# Patient Record
Sex: MALE | Race: WHITE | Employment: OTHER | URBAN - METROPOLITAN AREA
[De-identification: names, ages, dates, MRNs, and addresses within clinical notes are randomized per-mention and may not be internally consistent; named-entity substitution may affect disease eponyms.]

---

## 2017-06-11 ENCOUNTER — APPOINTMENT (OUTPATIENT)
Dept: GENERAL RADIOLOGY | Age: 62
End: 2017-06-11
Attending: EMERGENCY MEDICINE
Payer: SELF-PAY

## 2017-06-11 ENCOUNTER — HOSPITAL ENCOUNTER (EMERGENCY)
Age: 62
Discharge: HOME | End: 2017-06-11
Attending: EMERGENCY MEDICINE
Payer: SELF-PAY

## 2017-06-11 VITALS
HEIGHT: 71 IN | TEMPERATURE: 97.2 F | HEART RATE: 71 BPM | WEIGHT: 200 LBS | SYSTOLIC BLOOD PRESSURE: 157 MMHG | RESPIRATION RATE: 18 BRPM | DIASTOLIC BLOOD PRESSURE: 97 MMHG | OXYGEN SATURATION: 95 % | BODY MASS INDEX: 28 KG/M2

## 2017-06-11 DIAGNOSIS — R07.89 CHEST WALL PAIN: Primary | ICD-10-CM

## 2017-06-11 PROCEDURE — 99282 EMERGENCY DEPT VISIT SF MDM: CPT | Performed by: EMERGENCY MEDICINE

## 2017-06-11 PROCEDURE — 71120 X-RAY EXAM BREASTBONE 2/>VWS: CPT

## 2017-06-11 PROCEDURE — 71020 XR CHEST PA LAT: CPT

## 2017-06-11 RX ORDER — HYDROCODONE BITARTRATE AND ACETAMINOPHEN 5; 325 MG/1; MG/1
1 TABLET ORAL
Qty: 12 TAB | Refills: 0 | Status: SHIPPED | OUTPATIENT
Start: 2017-06-11 | End: 2021-05-25

## 2017-06-11 RX ORDER — NAPROXEN 375 MG/1
375 TABLET ORAL 2 TIMES DAILY WITH MEALS
Qty: 14 TAB | Refills: 0 | Status: SHIPPED | OUTPATIENT
Start: 2017-06-11 | End: 2021-05-25

## 2017-06-11 RX ORDER — LORAZEPAM 1 MG/1
1 TABLET ORAL
COMMUNITY

## 2017-06-11 NOTE — ED TRIAGE NOTES
Patient states 2 weeks ago he fell onto his chest in a parking lot. States that he tripped over a concrete parking block. Patient also states he hit his face. Patient also reports dyspnea.

## 2017-06-11 NOTE — DISCHARGE INSTRUCTIONS
Musculoskeletal Chest Pain: Care Instructions  Your Care Instructions  Chest pain is not always a sign that something is wrong with your heart or that you have another serious problem. The doctor thinks your chest pain is caused by strained muscles or ligaments, inflamed chest cartilage, or another problem in your chest, rather than by your heart. You may need more tests to find the cause of your chest pain. Follow-up care is a key part of your treatment and safety. Be sure to make and go to all appointments, and call your doctor if you are having problems. Its also a good idea to know your test results and keep a list of the medicines you take. How can you care for yourself at home? · Take pain medicines exactly as directed. ¨ If the doctor gave you a prescription medicine for pain, take it as prescribed. ¨ If you are not taking a prescription pain medicine, ask your doctor if you can take an over-the-counter medicine. · Rest and protect the sore area. · Stop, change, or take a break from any activity that may be causing your pain or soreness. · Put ice or a cold pack on the sore area for 10 to 20 minutes at a time. Try to do this every 1 to 2 hours for the next 3 days (when you are awake) or until the swelling goes down. Put a thin cloth between the ice and your skin. · After 2 or 3 days, apply a heating pad set on low or a warm cloth to the area that hurts. Some doctors suggest that you go back and forth between hot and cold. · Do not wrap or tape your ribs for support. This may cause you to take smaller breaths, which could increase your risk of lung problems. · Mentholated creams such as Bengay or Icy Hot may soothe sore muscles. Follow the instructions on the package. · Follow your doctor's instructions for exercising. · Gentle stretching and massage may help you get better faster. Stretch slowly to the point just before pain begins, and hold the stretch for at least 15 to 30 seconds.  Do this 3 or 4 times a day. Stretch just after you have applied heat. · As your pain gets better, slowly return to your normal activities. Any increased pain may be a sign that you need to rest a while longer. When should you call for help? Call 911 anytime you think you may need emergency care. For example, call if:  · You have chest pain or pressure. This may occur with:  ¨ Sweating. ¨ Shortness of breath. ¨ Nausea or vomiting. ¨ Pain that spreads from the chest to the neck, jaw, or one or both shoulders or arms. ¨ Dizziness or lightheadedness. ¨ A fast or uneven pulse. After calling 911, chew 1 adult-strength aspirin. Wait for an ambulance. Do not try to drive yourself. · You have sudden chest pain and shortness of breath, or you cough up blood. Call your doctor now or seek immediate medical care if:  · You have any trouble breathing. · Your chest pain gets worse. · Your chest pain occurs consistently with exercise and is relieved by rest.  Watch closely for changes in your health, and be sure to contact your doctor if:  · Your chest pain does not get better after 1 week. Where can you learn more? Go to http://vianca-ashlyn.info/. Enter V293 in the search box to learn more about \"Musculoskeletal Chest Pain: Care Instructions. \"  Current as of: May 27, 2016  Content Version: 11.2  © 1254-2333 [employer]. Care instructions adapted under license by [employer] (which disclaims liability or warranty for this information). If you have questions about a medical condition or this instruction, always ask your healthcare professional. [place] [address] any warranty or liability for your use of this information.

## 2017-06-11 NOTE — ED PROVIDER NOTES
HPI Comments: Patient is a 66 yo male who is coming in after having a fall about 2 weeks ago he states he tripped in a parking lot and fell onto the ground and hit the center of his chest on the concrete parking curb. He states that the pain has been constant and he states he does not feel sob now but sometimes the pain has made him feel this way. Patient is a 65 y.o. male presenting with fall. The history is provided by the patient. Fall   Pertinent negatives include no fever and no numbness. History reviewed. No pertinent past medical history. History reviewed. No pertinent surgical history. History reviewed. No pertinent family history. Social History     Social History    Marital status: SINGLE     Spouse name: N/A    Number of children: N/A    Years of education: N/A     Occupational History    Not on file. Social History Main Topics    Smoking status: Former Smoker    Smokeless tobacco: Not on file    Alcohol use Yes    Drug use: No    Sexual activity: Not on file     Other Topics Concern    Not on file     Social History Narrative         ALLERGIES: Other medication and Venom-yellow jacket    Review of Systems   Constitutional: Negative for chills and fever. Cardiovascular: Positive for chest pain. Musculoskeletal: Negative for back pain and neck pain. Skin: Negative for color change, pallor and wound. Neurological: Negative for weakness and numbness. All other systems reviewed and are negative. Vitals:    06/11/17 0630   BP: (!) 147/99   Pulse: 82   Resp: 20   Temp: 97.4 °F (36.3 °C)   SpO2: 97%   Weight: 90.7 kg (200 lb)   Height: 5' 11\" (1.803 m)            Physical Exam   Constitutional: He is oriented to person, place, and time. He appears well-developed and well-nourished. No distress. HENT:   Head: Normocephalic and atraumatic. Eyes: Conjunctivae are normal. No scleral icterus. Neck: Normal range of motion. Neck supple.    Cardiovascular: Normal rate, regular rhythm and normal heart sounds. Pulmonary/Chest: Effort normal and breath sounds normal. No stridor. No respiratory distress. He has no wheezes. He has no rales. He exhibits tenderness (patient is very tender in the anterior mid sternum with palpation). Abdominal: Soft. He exhibits no distension. There is no tenderness. There is no rebound and no guarding. Musculoskeletal: Normal range of motion. Neurological: He is alert and oriented to person, place, and time. No cranial nerve deficit. No focal weakness   Skin: Skin is warm and dry. No rash noted. He is not diaphoretic. No erythema. Psychiatric: He has a normal mood and affect. His behavior is normal.   Nursing note and vitals reviewed. MDM  Number of Diagnoses or Management Options  Chest wall pain:   Diagnosis management comments: Xray are negative normal vitals patient is in no distress I am treating symptomatically. Yanely Garcia MD; 6/11/2017 @7:40 AM Voice dictation software was used during the making of this note. This software is not perfect and grammatical and other typographical errors may be present. This note has not been proofread for errors.  ===================================================================        Amount and/or Complexity of Data Reviewed  Tests in the radiology section of CPT®: ordered and reviewed (Xr Chest Pa Lat    Result Date: 6/11/2017  Two view chest:  06/11/2017 History: Fall, chest pain. Comparison: None Findings: The heart and mediastinal silhouette are normal in size and configuration. The lungs and pleural spaces are clear. The pulmonary vascularity is within normal limits. The visualized osseous structures are unremarkable. Impression: Unremarkable two-view chest series. Xr Sternum    Result Date: 6/11/2017  Sternal radiographs HISTORY: Fall, pain 3 views of the sternum were obtained. No prior studies are available for comparison.  FINDINGS: There is no evidence of an acute sternal fracture. There is no bony destruction. The lungs appear grossly clear. IMPRESSION: No evidence of acute sternal fracture.    )      ED Course       Procedures

## 2017-10-24 PROBLEM — K43.9 VENTRAL HERNIA WITHOUT OBSTRUCTION OR GANGRENE: Status: ACTIVE | Noted: 2017-10-24

## 2018-04-13 ENCOUNTER — RX ONLY (OUTPATIENT)
Age: 63
Setting detail: RX ONLY
End: 2018-04-13

## 2018-04-13 PROBLEM — N40.1 BENIGN PROSTATIC HYPERPLASIA WITH LOWER URINARY TRACT SYMPTOMS: Status: ACTIVE | Noted: 2018-04-13

## 2019-04-25 PROBLEM — F51.01 PRIMARY INSOMNIA: Status: ACTIVE | Noted: 2019-04-25

## 2019-10-16 PROBLEM — H34.8122 CENTRAL RETINAL VEIN OCCLUSION, LEFT EYE, STABLE: Status: ACTIVE | Noted: 2019-10-16

## 2019-10-16 PROBLEM — I10 ESSENTIAL (PRIMARY) HYPERTENSION: Status: ACTIVE | Noted: 2019-10-16

## 2020-02-24 ENCOUNTER — RX ONLY (OUTPATIENT)
Age: 65
Setting detail: RX ONLY
End: 2020-02-24

## 2020-05-07 ENCOUNTER — RX ONLY (OUTPATIENT)
Age: 65
Setting detail: RX ONLY
End: 2020-05-07

## 2020-06-09 ENCOUNTER — RX ONLY (OUTPATIENT)
Age: 65
Setting detail: RX ONLY
End: 2020-06-09

## 2020-07-08 ENCOUNTER — RX ONLY (OUTPATIENT)
Age: 65
Setting detail: RX ONLY
End: 2020-07-08

## 2020-07-30 PROBLEM — M17.11 UNILATERAL PRIMARY OSTEOARTHRITIS, RIGHT KNEE: Status: ACTIVE | Noted: 2020-07-30

## 2020-11-24 ENCOUNTER — RX ONLY (OUTPATIENT)
Age: 65
Setting detail: RX ONLY
End: 2020-11-24

## 2020-12-15 ENCOUNTER — RX ONLY (OUTPATIENT)
Age: 65
Setting detail: RX ONLY
End: 2020-12-15

## 2021-05-03 ENCOUNTER — RX ONLY (OUTPATIENT)
Age: 66
Setting detail: RX ONLY
End: 2021-05-03

## 2021-05-03 PROBLEM — N41.0 ACUTE PROSTATITIS: Status: ACTIVE | Noted: 2021-05-03

## 2021-05-03 PROBLEM — R97.20 ELEVATED PROSTATE SPECIFIC ANTIGEN [PSA]: Status: ACTIVE | Noted: 2021-05-03

## 2021-05-05 ENCOUNTER — RX ONLY (OUTPATIENT)
Age: 66
Setting detail: RX ONLY
End: 2021-05-05

## 2021-05-12 ENCOUNTER — APPOINTMENT (RX ONLY)
Dept: URBAN - METROPOLITAN AREA CLINIC 23 | Facility: CLINIC | Age: 66
Setting detail: DERMATOLOGY
End: 2021-05-12

## 2021-05-12 DIAGNOSIS — L57.8 OTHER SKIN CHANGES DUE TO CHRONIC EXPOSURE TO NONIONIZING RADIATION: ICD-10-CM

## 2021-05-12 DIAGNOSIS — D485 NEOPLASM OF UNCERTAIN BEHAVIOR OF SKIN: ICD-10-CM

## 2021-05-12 PROBLEM — D48.5 NEOPLASM OF UNCERTAIN BEHAVIOR OF SKIN: Status: ACTIVE | Noted: 2021-05-12

## 2021-05-12 PROCEDURE — ? BIOPSY BY SHAVE METHOD

## 2021-05-12 PROCEDURE — 99202 OFFICE O/P NEW SF 15 MIN: CPT | Mod: 25

## 2021-05-12 PROCEDURE — ? COUNSELING

## 2021-05-12 PROCEDURE — 11102 TANGNTL BX SKIN SINGLE LES: CPT

## 2021-05-12 ASSESSMENT — LOCATION SIMPLE DESCRIPTION DERM
LOCATION SIMPLE: POSTERIOR NECK
LOCATION SIMPLE: CHEST
LOCATION SIMPLE: ABDOMEN
LOCATION SIMPLE: RIGHT FOREARM
LOCATION SIMPLE: LEFT FOREARM

## 2021-05-12 ASSESSMENT — LOCATION DETAILED DESCRIPTION DERM
LOCATION DETAILED: RIGHT RIB CAGE
LOCATION DETAILED: LEFT PROXIMAL DORSAL FOREARM
LOCATION DETAILED: LEFT MEDIAL SUPERIOR CHEST
LOCATION DETAILED: RIGHT PROXIMAL DORSAL FOREARM
LOCATION DETAILED: RIGHT MEDIAL TRAPEZIAL NECK

## 2021-05-12 ASSESSMENT — LOCATION ZONE DERM
LOCATION ZONE: TRUNK
LOCATION ZONE: ARM
LOCATION ZONE: NECK

## 2021-05-12 NOTE — PROCEDURE: BIOPSY BY SHAVE METHOD
Depth Of Biopsy: dermis
Biopsy Type: H and E
Additional Anesthesia Volume In Cc (Will Not Render If 0): 0
Hide Biopsy Depth?: No
Type Of Destruction Used: Curettage
Wound Care: Vaseline
Accession #: [id number]
Hemostasis: Aluminum Chloride
Information: Selecting Yes will display possible errors in your note based on the variables you have selected. This validation is only offered as a suggestion for you. PLEASE NOTE THAT THE VALIDATION TEXT WILL BE REMOVED WHEN YOU FINALIZE YOUR NOTE. IF YOU WANT TO FAX A PRELIMINARY NOTE YOU WILL NEED TO TOGGLE THIS TO 'NO' IF YOU DO NOT WANT IT IN YOUR FAXED NOTE.
Billing Type: Third-Party Bill
Cryotherapy Text: The wound bed was treated with cryotherapy after the biopsy was performed.
Detail Level: Detailed
Anesthesia Type: 1% lidocaine with epinephrine
Anesthesia Volume In Cc: 0.3
Silver Nitrate Text: The wound bed was treated with silver nitrate after the biopsy was performed.
Notification Instructions: Patient will be notified of biopsy results. However, patient instructed to call the office if not contacted within 2 weeks.
Electrodesiccation And Curettage Text: The wound bed was treated with electrodesiccation and curettage after the biopsy was performed.
Post-care instructions were reviewed in detail and written instructions are provided. Patient is to keep the biopsy site dry overnight, and then apply bacitracin twice daily until healed. Patient may apply hydrogen peroxide soaks to remove any crusting.
Consent: Written consent was obtained and risks were reviewed including but not limited to scarring, infection, bleeding, scabbing, incomplete removal, and allergy to anesthesia.
Electrodesiccation Text: The wound bed was treated with electrodesiccation after the biopsy was performed.
Biopsy Method: Dermablade
Was A Bandage Applied: Yes
Dressing: bandage

## 2021-06-10 ENCOUNTER — APPOINTMENT (RX ONLY)
Dept: URBAN - METROPOLITAN AREA CLINIC 23 | Facility: CLINIC | Age: 66
Setting detail: DERMATOLOGY
End: 2021-06-10

## 2021-06-10 PROBLEM — C44.529 SQUAMOUS CELL CARCINOMA OF SKIN OF OTHER PART OF TRUNK: Status: ACTIVE | Noted: 2021-06-10

## 2021-06-10 PROCEDURE — 12032 INTMD RPR S/A/T/EXT 2.6-7.5: CPT

## 2021-06-10 PROCEDURE — 11602 EXC TR-EXT MAL+MARG 1.1-2 CM: CPT

## 2021-06-10 PROCEDURE — ? EXCISION

## 2021-06-12 ENCOUNTER — HOSPITAL ENCOUNTER (OUTPATIENT)
Dept: MRI IMAGING | Age: 66
Discharge: HOME | End: 2021-06-12
Attending: UROLOGY
Payer: MEDICARE

## 2021-06-12 DIAGNOSIS — R97.20 ELEVATED PSA: ICD-10-CM

## 2021-06-12 PROCEDURE — 74011000258 HC RX REV CODE- 258: Performed by: UROLOGY

## 2021-06-12 PROCEDURE — A9576 INJ PROHANCE MULTIPACK: HCPCS | Performed by: UROLOGY

## 2021-06-12 PROCEDURE — 74011636320 HC RX REV CODE- 636/320: Performed by: UROLOGY

## 2021-06-12 PROCEDURE — 72197 MRI PELVIS W/O & W/DYE: CPT

## 2021-06-12 RX ORDER — SODIUM CHLORIDE 0.9 % (FLUSH) 0.9 %
10 SYRINGE (ML) INJECTION
Status: COMPLETED | OUTPATIENT
Start: 2021-06-12 | End: 2021-06-12

## 2021-06-12 RX ADMIN — SODIUM CHLORIDE 100 ML: 900 INJECTION, SOLUTION INTRAVENOUS at 08:25

## 2021-06-12 RX ADMIN — GADOTERIDOL 19 ML: 279.3 INJECTION, SOLUTION INTRAVENOUS at 08:24

## 2021-06-12 RX ADMIN — Medication 10 ML: at 08:25

## 2021-06-15 NOTE — PROGRESS NOTES
Pls call pt and read him the impression of his prostate MRI. That is an area I will recommend we do a biopsy of-- but pls tell him I will discuss this with him in the office and show him the MRI images. Move up his appt if he wants to.   Thx, will

## 2021-06-24 ENCOUNTER — APPOINTMENT (RX ONLY)
Dept: URBAN - METROPOLITAN AREA CLINIC 23 | Facility: CLINIC | Age: 66
Setting detail: DERMATOLOGY
End: 2021-06-24

## 2021-06-24 DIAGNOSIS — Z48.01 ENCOUNTER FOR CHANGE OR REMOVAL OF SURGICAL WOUND DRESSING: ICD-10-CM

## 2021-06-24 PROCEDURE — ? SUTURE REMOVAL (GLOBAL PERIOD)

## 2021-06-24 ASSESSMENT — LOCATION SIMPLE DESCRIPTION DERM: LOCATION SIMPLE: ABDOMEN

## 2021-06-24 ASSESSMENT — LOCATION DETAILED DESCRIPTION DERM: LOCATION DETAILED: RIGHT RIB CAGE

## 2021-06-24 ASSESSMENT — LOCATION ZONE DERM: LOCATION ZONE: TRUNK

## 2021-06-24 NOTE — PROCEDURE: SUTURE REMOVAL (GLOBAL PERIOD)
Add 39854 Cpt? (Important Note: In 2017 The Use Of 12421 Is Being Tracked By Cms To Determine Future Global Period Reimbursement For Global Periods): no
Detail Level: Simple

## 2021-07-09 ENCOUNTER — HOSPITAL ENCOUNTER (EMERGENCY)
Age: 66
Discharge: HOME | End: 2021-07-09
Payer: MEDICARE

## 2021-07-09 VITALS
SYSTOLIC BLOOD PRESSURE: 146 MMHG | HEART RATE: 62 BPM | RESPIRATION RATE: 18 BRPM | DIASTOLIC BLOOD PRESSURE: 96 MMHG | WEIGHT: 200 LBS | OXYGEN SATURATION: 97 % | TEMPERATURE: 98.1 F | BODY MASS INDEX: 27.89 KG/M2

## 2021-07-09 DIAGNOSIS — R33.9 URINARY RETENTION: Primary | ICD-10-CM

## 2021-07-09 LAB
APPEARANCE UR: CLEAR
BILIRUB UR QL: NEGATIVE
COLOR UR: YELLOW
GLUCOSE UR STRIP.AUTO-MCNC: NEGATIVE MG/DL
HGB UR QL STRIP: NEGATIVE
KETONES UR QL STRIP.AUTO: NEGATIVE MG/DL
LEUKOCYTE ESTERASE UR QL STRIP.AUTO: NEGATIVE
NITRITE UR QL STRIP.AUTO: NEGATIVE
PH UR STRIP: 5.5 [PH] (ref 5–9)
PROT UR STRIP-MCNC: NEGATIVE MG/DL
SP GR UR REFRACTOMETRY: 1.03 (ref 1–1.02)
UROBILINOGEN UR QL STRIP.AUTO: 1 EU/DL (ref 0.2–1)

## 2021-07-09 PROCEDURE — 51702 INSERT TEMP BLADDER CATH: CPT

## 2021-07-09 PROCEDURE — 99283 EMERGENCY DEPT VISIT LOW MDM: CPT

## 2021-07-09 PROCEDURE — 81003 URINALYSIS AUTO W/O SCOPE: CPT

## 2021-07-09 PROCEDURE — 87086 URINE CULTURE/COLONY COUNT: CPT

## 2021-07-09 NOTE — ED TRIAGE NOTES
Pt states he has not been able to void today, pt states he requested not to have a michael when he was at his urologist, was instructed to take an extra flomax but it is not helping.  States the urologist checked his urine for infection and there was none

## 2021-07-09 NOTE — ED PROVIDER NOTES
28-year-old male complaining of urinary retention. Patient states that he has had problems with this in the past was seen at the urologist office today due to difficulty urinating he did not allow them to put a catheter in today he is being worked up and has a scheduled MRI of the prostate on July 22. Bladder Infection   This is a recurrent problem. The current episode started more than 2 days ago. The problem has not changed since onset. The quality of the pain is described as aching. The pain is at a severity of 8/10. The pain is moderate. There has been no fever. He is not sexually active. Associated symptoms include hesitancy. Pertinent negatives include no chills, no sweats, no vomiting and no discharge. He has tried nothing for the symptoms. No past medical history on file. No past surgical history on file. No family history on file. Social History     Socioeconomic History    Marital status: SINGLE     Spouse name: Not on file    Number of children: Not on file    Years of education: Not on file    Highest education level: Not on file   Occupational History    Not on file   Tobacco Use    Smoking status: Former Smoker    Smokeless tobacco: Never Used   Substance and Sexual Activity    Alcohol use: Yes    Drug use: No    Sexual activity: Not on file   Other Topics Concern    Not on file   Social History Narrative    Not on file     Social Determinants of Health     Financial Resource Strain:     Difficulty of Paying Living Expenses:    Food Insecurity:     Worried About Running Out of Food in the Last Year:     [address] in the Last Year:    Transportation Needs:     Lack of Transportation (Medical):      Lack of Transportation (Non-Medical):    Physical Activity:     Days of Exercise per Week:     Minutes of Exercise per Session:    Stress:     Feeling of Stress :    Social Connections:     Frequency of Communication with Friends and Family:     Frequency of Social Gatherings with Friends and Family:     Attends Taoism Services:     Active Member of Clubs or Organizations:     Attends Club or Organization Meetings:     Marital Status:    Intimate Partner Violence:     Fear of Current or Ex-Partner:     Emotionally Abused:     Physically Abused:     Sexually Abused: ALLERGIES: Monosodium glutamate, Other medication, and Venom-yellow jacket    Review of Systems   Constitutional: Negative. Negative for activity change and chills. HENT: Negative. Eyes: Negative. Respiratory: Negative. Cardiovascular: Negative. Gastrointestinal: Negative. Negative for vomiting. Genitourinary: Positive for hesitancy. Musculoskeletal: Negative. Skin: Negative. Neurological: Negative. Psychiatric/Behavioral: Negative. All other systems reviewed and are negative. Vitals:    07/09/21 0029   BP: (!) 144/92   Pulse: 75   Resp: 18   Temp: 98.1 °F (36.7 °C)   SpO2: 98%   Weight: 90.7 kg (200 lb)            Physical Exam  Vitals and nursing note reviewed. Constitutional:       General: He is not in acute distress. Appearance: He is well-developed. HENT:      Head: Normocephalic and atraumatic. Right Ear: External ear normal.      Left Ear: External ear normal.      Nose: Nose normal.   Eyes:      General: No scleral icterus. Right eye: No discharge. Left eye: No discharge. Conjunctiva/sclera: Conjunctivae normal.      Pupils: Pupils are equal, round, and reactive to light. Cardiovascular:      Rate and Rhythm: Regular rhythm. Pulmonary:      Effort: Pulmonary effort is normal. No respiratory distress. Breath sounds: Normal breath sounds. No stridor. No wheezing or rales. Abdominal:      General: Bowel sounds are normal. There is no distension. Palpations: Abdomen is soft. Tenderness: There is no abdominal tenderness. Musculoskeletal:         General: Normal range of motion.       Cervical back: Normal range of motion. Skin:     General: Skin is warm and dry. Findings: No rash. Neurological:      Mental Status: He is alert and oriented to person, place, and time. Motor: No abnormal muscle tone. Coordination: Coordination normal.   Psychiatric:         Behavior: Behavior normal.          MDM  Number of Diagnoses or Management Options  Diagnosis management comments: Differential diagnosis includes but is not limited to: Bladder outlet obstruction, kidney stones, UTI, bladder spasm, enlarged prostate,    Hauser Catheter was placed without difficulty clear jerrod urine was returned there was no signs of infection on micro. Patient requesting Hauser be taken out he be discharged and will see his urologist this morning.        Amount and/or Complexity of Data Reviewed  Clinical lab tests: ordered and reviewed  Tests in the medicine section of CPT®: ordered and reviewed  Decide to obtain previous medical records or to obtain history from someone other than the patient: yes  Review and summarize past medical records: yes  Independent visualization of images, tracings, or specimens: yes    Risk of Complications, Morbidity, and/or Mortality  Presenting problems: high  Diagnostic procedures: high  Management options: high    Patient Progress  Patient progress: stable         Procedures

## 2021-07-09 NOTE — ED NOTES
I have reviewed discharge instructions with the patient. The patient verbalized understanding. Patient left ED via Discharge Method: ambulatory to Home     Opportunity for questions and clarification provided. Patient given 0 scripts. To continue your aftercare when you leave the hospital, you may receive an automated call from our care team to check in on how you are doing.  This is a free service and part of our promise to provide the best care and service to meet your aftercare needs. \" If you have questions, or wish to unsubscribe from this service please call [phone].  Thank you for Choosing our [address] [place] [address] Emergency Department.

## 2021-07-11 LAB
BACTERIA SPEC CULT: NORMAL
SERVICE CMNT-IMP: NORMAL

## 2021-07-22 ENCOUNTER — ANESTHESIA (OUTPATIENT)
Dept: SURGERY | Age: 66
End: 2021-07-22
Payer: MEDICARE

## 2021-07-22 ENCOUNTER — ANESTHESIA EVENT (OUTPATIENT)
Dept: SURGERY | Age: 66
End: 2021-07-22
Payer: MEDICARE

## 2021-07-22 ENCOUNTER — HOSPITAL ENCOUNTER (OUTPATIENT)
Age: 66
Setting detail: OUTPATIENT SURGERY
Discharge: HOME | End: 2021-07-22
Attending: UROLOGY | Admitting: UROLOGY
Payer: MEDICARE

## 2021-07-22 VITALS
HEIGHT: 71 IN | DIASTOLIC BLOOD PRESSURE: 87 MMHG | BODY MASS INDEX: 28.7 KG/M2 | HEART RATE: 61 BPM | SYSTOLIC BLOOD PRESSURE: 144 MMHG | OXYGEN SATURATION: 99 % | TEMPERATURE: 97.6 F | RESPIRATION RATE: 12 BRPM | WEIGHT: 205 LBS

## 2021-07-22 DIAGNOSIS — R97.20 ELEVATED PSA: ICD-10-CM

## 2021-07-22 LAB
APPEARANCE UR: CLEAR
BACTERIA URNS QL MICRO: 0 /HPF
BILIRUB UR QL: NEGATIVE
COLOR UR: YELLOW
GLUCOSE UR STRIP.AUTO-MCNC: NEGATIVE MG/DL
HGB UR QL STRIP: NEGATIVE
KETONES UR QL STRIP.AUTO: NEGATIVE MG/DL
LEUKOCYTE ESTERASE UR QL STRIP.AUTO: NEGATIVE
NITRITE UR QL STRIP.AUTO: NEGATIVE
PH UR STRIP: 5.5 [PH] (ref 5–9)
PROT UR STRIP-MCNC: NEGATIVE MG/DL
SP GR UR REFRACTOMETRY: 1.02 (ref 1–1.02)
UROBILINOGEN UR QL STRIP.AUTO: 0.2 EU/DL (ref 0.2–1)

## 2021-07-22 PROCEDURE — 74011000250 HC RX REV CODE- 250: Performed by: STUDENT IN AN ORGANIZED HEALTH CARE EDUCATION/TRAINING PROGRAM

## 2021-07-22 PROCEDURE — 76210000020 HC REC RM PH II FIRST 0.5 HR: Performed by: UROLOGY

## 2021-07-22 PROCEDURE — 76210000006 HC OR PH I REC 0.5 TO 1 HR: Performed by: UROLOGY

## 2021-07-22 PROCEDURE — 2709999900 HC NON-CHARGEABLE SUPPLY: Performed by: UROLOGY

## 2021-07-22 PROCEDURE — 74011000258 HC RX REV CODE- 258: Performed by: UROLOGY

## 2021-07-22 PROCEDURE — 88305 TISSUE EXAM BY PATHOLOGIST: CPT

## 2021-07-22 PROCEDURE — 76060000032 HC ANESTHESIA 0.5 TO 1 HR: Performed by: UROLOGY

## 2021-07-22 PROCEDURE — 55700 PR BIOPSY OF PROSTATE,NEEDLE/PUNCH: CPT | Performed by: UROLOGY

## 2021-07-22 PROCEDURE — 74011250636 HC RX REV CODE- 250/636: Performed by: STUDENT IN AN ORGANIZED HEALTH CARE EDUCATION/TRAINING PROGRAM

## 2021-07-22 PROCEDURE — 74011250636 HC RX REV CODE- 250/636: Performed by: ANESTHESIOLOGY

## 2021-07-22 PROCEDURE — 76942 ECHO GUIDE FOR BIOPSY: CPT | Performed by: UROLOGY

## 2021-07-22 PROCEDURE — 74011250637 HC RX REV CODE- 250/637: Performed by: ANESTHESIOLOGY

## 2021-07-22 PROCEDURE — 81003 URINALYSIS AUTO W/O SCOPE: CPT

## 2021-07-22 PROCEDURE — 77030003481 HC NDL BIOP GUN BARD -B: Performed by: UROLOGY

## 2021-07-22 PROCEDURE — 74011250636 HC RX REV CODE- 250/636: Performed by: UROLOGY

## 2021-07-22 PROCEDURE — 76010000154 HC OR TIME FIRST 0.5 HR: Performed by: UROLOGY

## 2021-07-22 RX ORDER — LIDOCAINE HYDROCHLORIDE 10 MG/ML
0.3 INJECTION INFILTRATION; PERINEURAL ONCE
Status: DISCONTINUED | OUTPATIENT
Start: 2021-07-22 | End: 2021-07-22 | Stop reason: HOSPADM

## 2021-07-22 RX ORDER — LIDOCAINE HYDROCHLORIDE 20 MG/ML
INJECTION, SOLUTION EPIDURAL; INFILTRATION; INTRACAUDAL; PERINEURAL AS NEEDED
Status: DISCONTINUED | OUTPATIENT
Start: 2021-07-22 | End: 2021-07-22 | Stop reason: HOSPADM

## 2021-07-22 RX ORDER — MIDAZOLAM HYDROCHLORIDE 1 MG/ML
2 INJECTION, SOLUTION INTRAMUSCULAR; INTRAVENOUS
Status: DISCONTINUED | OUTPATIENT
Start: 2021-07-22 | End: 2021-07-22 | Stop reason: HOSPADM

## 2021-07-22 RX ORDER — SODIUM CHLORIDE, SODIUM LACTATE, POTASSIUM CHLORIDE, CALCIUM CHLORIDE 600; 310; 30; 20 MG/100ML; MG/100ML; MG/100ML; MG/100ML
100 INJECTION, SOLUTION INTRAVENOUS CONTINUOUS
Status: DISCONTINUED | OUTPATIENT
Start: 2021-07-22 | End: 2021-07-22 | Stop reason: HOSPADM

## 2021-07-22 RX ORDER — SODIUM CHLORIDE 0.9 % (FLUSH) 0.9 %
5-40 SYRINGE (ML) INJECTION AS NEEDED
Status: DISCONTINUED | OUTPATIENT
Start: 2021-07-22 | End: 2021-07-22 | Stop reason: HOSPADM

## 2021-07-22 RX ORDER — OXYCODONE HYDROCHLORIDE 5 MG/1
10 TABLET ORAL
Status: COMPLETED | OUTPATIENT
Start: 2021-07-22 | End: 2021-07-22

## 2021-07-22 RX ORDER — PROPOFOL 10 MG/ML
INJECTION, EMULSION INTRAVENOUS AS NEEDED
Status: DISCONTINUED | OUTPATIENT
Start: 2021-07-22 | End: 2021-07-22 | Stop reason: HOSPADM

## 2021-07-22 RX ORDER — PROPOFOL 10 MG/ML
INJECTION, EMULSION INTRAVENOUS
Status: DISCONTINUED | OUTPATIENT
Start: 2021-07-22 | End: 2021-07-22 | Stop reason: HOSPADM

## 2021-07-22 RX ORDER — SODIUM CHLORIDE 0.9 % (FLUSH) 0.9 %
5-40 SYRINGE (ML) INJECTION EVERY 8 HOURS
Status: DISCONTINUED | OUTPATIENT
Start: 2021-07-22 | End: 2021-07-22 | Stop reason: HOSPADM

## 2021-07-22 RX ORDER — HYDROMORPHONE HYDROCHLORIDE 2 MG/ML
0.5 INJECTION, SOLUTION INTRAMUSCULAR; INTRAVENOUS; SUBCUTANEOUS
Status: DISCONTINUED | OUTPATIENT
Start: 2021-07-22 | End: 2021-07-22 | Stop reason: HOSPADM

## 2021-07-22 RX ADMIN — SODIUM CHLORIDE, POTASSIUM CHLORIDE, SODIUM LACTATE AND CALCIUM CHLORIDE 100 ML/HR: 600; 310; 30; 20 INJECTION, SOLUTION INTRAVENOUS at 07:00

## 2021-07-22 RX ADMIN — LIDOCAINE HYDROCHLORIDE 50 MG: 20 INJECTION, SOLUTION EPIDURAL; INFILTRATION; INTRACAUDAL; PERINEURAL at 09:11

## 2021-07-22 RX ADMIN — CEFTRIAXONE 1 G: 1 INJECTION, POWDER, FOR SOLUTION INTRAMUSCULAR; INTRAVENOUS at 09:11

## 2021-07-22 RX ADMIN — PROPOFOL 40 MG: 10 INJECTION, EMULSION INTRAVENOUS at 09:11

## 2021-07-22 RX ADMIN — OXYCODONE 10 MG: 5 TABLET ORAL at 09:49

## 2021-07-22 RX ADMIN — PROPOFOL 10 MG: 10 INJECTION, EMULSION INTRAVENOUS at 09:12

## 2021-07-22 RX ADMIN — PROPOFOL 30 MG: 10 INJECTION, EMULSION INTRAVENOUS at 09:21

## 2021-07-22 RX ADMIN — PROPOFOL 200 MCG/KG/MIN: 10 INJECTION, EMULSION INTRAVENOUS at 09:11

## 2021-07-22 NOTE — ANESTHESIA POSTPROCEDURE EVALUATION
Procedure(s):  MRI FUSION PROSTATE BIOPSY.     total IV anesthesia    Anesthesia Post Evaluation      Multimodal analgesia: multimodal analgesia used between 6 hours prior to anesthesia start to PACU discharge  Patient location during evaluation: PACU  Patient participation: complete - patient participated  Level of consciousness: awake and alert  Pain management: adequate  Airway patency: patent  Anesthetic complications: no  Cardiovascular status: acceptable  Respiratory status: acceptable  Hydration status: acceptable  Post anesthesia nausea and vomiting:  none      INITIAL Post-op Vital signs:   Vitals Value Taken Time   /87 07/22/21 1009   Temp 36.4 °C (97.6 °F) 07/22/21 1011   Pulse 61 07/22/21 1011   Resp 12 07/22/21 1011   SpO2 99 % 07/22/21 1011

## 2021-07-22 NOTE — ANESTHESIA PREPROCEDURE EVALUATION
Relevant Problems   No relevant active problems       Anesthetic History               Review of Systems / Medical History  Patient summary reviewed and pertinent labs reviewed    Pulmonary    COPD: mild          Pertinent negatives: No smoker (quit 10 years ago)     Neuro/Psych              Cardiovascular    Hypertension              Exercise tolerance: >4 METS     GI/Hepatic/Renal     GERD: well controlled           Endo/Other             Other Findings   Comments: Heavy alcohol use           Physical Exam    Airway  Mallampati: III  TM Distance: 4 - 6 cm  Neck ROM: normal range of motion   Mouth opening: Diminished (comment)     Cardiovascular    Rhythm: regular  Rate: normal         Dental  No notable dental hx       Pulmonary  Breath sounds clear to auscultation               Abdominal         Other Findings            Anesthetic Plan    ASA: 3  Anesthesia type: total IV anesthesia          Induction: Intravenous  Anesthetic plan and risks discussed with: Patient

## 2021-07-22 NOTE — PERIOP NOTES
Pt and friend Maria T Harden given discharge instructions at bedside, both verbalize understanding. All questions answered.

## 2021-07-22 NOTE — PROGRESS NOTES
[facility]  [address] [place] [address] [address], [address] [facility]  [phone]        Mr. Denisha Gaxiola is a 73 y.o. male with a diagnosis of Elevated PSA. INTERVAL HISTORY:  He returns today to discuss his elevated PSA and review his prostate MRI. We looked at the images together. He has a PI-RADS 4 lesion on the left mid peripheral portion of his gland. It is a 9 mm discrete nodule. There is no evidence of extracapsular extension. I see no enlarged lymph nodes. His PSA initially was 4.1 when we rechecked and on 5/25/2021 it was 5.8. Of note he has a brother who has a history of prostate cancer and underwent brachytherapy. Digital rectal exam at his last appointment was a 20 to 30 cc gland without any significant abnormalities. He continues to have some intermittent pain in his left lower quadrant that he is uncertain what is the cause. His MRI shows a quite distended bladder. He does not feel like he empties his bladder well. Past medical, family and social histories, as well as medications and allergies, were reviewed and updated in the medical record as appropriate.     PMH:     Past Medical History:   Diagnosis Date    Arthritis     OA- knees     Emphysema lung ([address] 2021    \"very mild per scan\" no inhalers    Former smoker, stopped smoking in distant past     Quit 2011    GERD (gastroesophageal reflux disease)     omeprazole & pepcid daily - well controlled    Hypertension     losartan    Sleep disorder     ativan QHS       MEDs:     cefTRIAXone (ROCEPHIN) 1 g IVPB (MBP)  ciprofloxacin HCl  cyclobenzaprine  famotidine  finasteride  fluticasone propionate  lactated Ringers  lidocaine  LORazepam  losartan  midazolam  omeprazole  sodium chloride  tamsulosin  traMADoL     ALLERGIES:    Allergies   Allergen Reactions    Monosodium Glutamate Nausea and Vomiting    Other Medication Hives     Red ants    Venom-Yellow Jacket Hives     States no longer has reaction       ROS:     Review of Systems   All other systems reviewed and are negative. PHYSICAL EXAMINATION    Visit Vitals  BP (!) 156/90 (BP 1 Location: Right arm, BP Patient Position: At rest)   Pulse 73   Temp 98 °F (36.7 °C)   Resp 20   Ht 5' 11\" (1.803 m)   Wt 205 lb (93 kg)   SpO2 97%   BMI 28.59 kg/m²     General: well dressed, well nourished, no acute distress  Skin: no rashes  HEENT: Sclera are clear,normocephalic, atraumatic. no external lesions   Cardiovascular: Reg. Normal perfusion  Respiratory: normal respiratory effort, no JVD, no audible wheezing. Musculoskeletal: unremarkable with normal function. No embolic signs or cyanosis.    Neurologic exam: intact, no focal deficits, moves all 4 extremities  Psych: normal mood and affect, alert, oriented x 3  LE:  no edema  GI: soft, nontender, no masses, no CVA tenderness  Lymphatic: no axillary, inguinal, cervical or supraclavicular adenopathy  : see last note      LABORATORY RESULTS:  Results for orders placed or performed during the hospital encounter of 07/22/21   URINALYSIS W/ RFLX MICROSCOPIC   Result Value Ref Range    Color YELLOW      Appearance CLEAR      Specific gravity 1.025 (H) 1.001 - 1.023      pH (UA) 5.5 5.0 - 9.0      Protein Negative NEG mg/dL    Glucose Negative NEG mg/dL    Ketone Negative NEG mg/dL    Bilirubin Negative NEG      Blood Negative NEG      Urobilinogen 0.2 0.2 - 1.0 EU/dL    Nitrites Negative NEG      Leukocyte Esterase Negative NEG      Bacteria 0 0 /hpf       Lab Results   Component Value Date/Time    Prostate Specific Ag 5.8 (H) 05/25/2021 01:20 PM        Lab Results   Component Value Date/Time    Sodium 138 07/08/2012 09:30 AM    Potassium 4.4 07/08/2012 09:30 AM    Chloride 105 07/08/2012 09:30 AM    CO2 22 (L) 07/08/2012 09:30 AM    Anion gap 11 07/08/2012 09:30 AM    Glucose 97 07/08/2012 09:30 AM    BUN 15 07/08/2012 09:30 AM    Creatinine 1.1 07/08/2012 09:30 AM    GFR est AA >60 07/08/2012 09:30 AM GFR est non-AA >60 07/08/2012 09:30 AM    Calcium 8.6 07/08/2012 09:30 AM        ASSESSMENT:    Mr. Jenni Ricardo is a 73 y.o. male with a diagnosis of an elevated PSA and a 9 mm PI-RADS 4 lesion on his prostate MRI. I recommended he consider an MRI fusion transrectal guided biopsy of his prostate. We discussed the risk and benefits of that as outlined below. I prescribed him 2 tablets of ciprofloxacin to start the morning of his biopsy. I will ask Hiren Cooper to contact him to schedule this. In regards to his left lower quadrant pain I am going to check a post void residual today and if he is not emptying well can teach him intermittent catheterization to see if that helps with his pain. If not we may need to look for possible other causes in the musculoskeletal or potential hernia. We reviewed with the patient the significance of an elevated PSA. We discussed benign etiologies such as; benign enlargement of the prostate, inflammation of the prostate, infections of the prostate, and prostatic manipulation as a possible cause. We also discussed the possibility of prostate cancer. We explained that in men with PSAs that are between 4.0 and 10, the risk of harboring a prostate cancer is at least 20-25%. The only way to really differentiate this with certainty would be a biopsy of the prostate. The most common way to diagnose prostate cancer is an ultrasound-guided biopsy of the prostate. Alternatively, we could repeat the PSA, do a free/total PSA, or just manage the patient with observation. We discussed the risks inherent in a biopsy including but not limited to; bleeding, infection, and pain associated with the procedure. We recommend the patient stop any aspirin or nonsteroidal anti-inflammatories or other blood thinners approximately 10 days prior to the procedure.  If the patient is taking any of these medications for heart issues or other specific reasons, he needs to consult with the prescribing doctor before stopping treatment. We explained that we give him prophylactic antibiotic(s) around the time of the procedure to help prevent serious infectious complications (which have been on the rise). We also discussed concerns with over-treatment of prostate cancer and that the main purpose of the biopsy is to hopefully rule out high-grade prostate cancer. After a full discussion regarding the potential risks, benefits, and treatment alternatives, the patient has decided to proceed with a prostate biopsy. PLAN:     -MRI fusion bx  -cipro 2 tabs  ________________________________________    No changes to H&P.       Lorenza Rodriguez MD,MPH, [address] [facility]    HPI      Physical Exam

## 2021-07-22 NOTE — OP NOTES
Operative Note        Patient: Devang Tello [id number]    Date of Surgery: 07/22/21    Preoperative Diagnosis: Elevated PSA    Postoperative Diagnosis:  same    Surgeon(s) and Role:     * Avila Chappell MD - Primary     Anesthesia:  MAC     Procedure: Procedure(s):  Pricila Barrier MRI fused TRUS prostate biopsy     Indications:     Discussed the risk of surgery including infection, hematoma, bleeding, recurrence or persistence of symptoms or stone, and the risks of general anesthetic. The patient understands the risks, any and all questions were answered to the patient's satisfaction and they freely signed the consent for operation. URONAV MRI FUSED TRANSRECTAL ULTRASOUND GUIDED BIOPSY OF THE PROSTATE    All risks, benefits and alternatives were again reviewed and he is willing to proceed at this time. The patient was placed in the left lateral decubitus position. Rocephin was given as a prophylactic antibiotic. I then inserted the transrectal ultrasound probe into the rectum. The prostate was visualized. The prostate appeared homogenous in appearance. Ultrasonographic sweep of the prostate was performed to obtain images to link to MRI via URONAV. There were 1 region of interest based upon MRI imaging. 3 biopsies of region of interest were then obtained using the ultrasound images for guidance that had been linked to the previous MRI using Uronav. I then performed 12 needle core biopsies using a standard sextant biopsy format with traditional ultrasound images for guidance. The ultrasound probe was removed. The patient tolerated the procedure well. PROSTATE VOLUME:  14 cc    PHOEBE: No induration or nodule     Estimated Blood Loss:  minimal    Specimens: prostate biopsies             Drains: none                 Implants: * No implants in log *    Liu Morrison M.D.     [facility]  [address] [place] [address]

## 2021-07-22 NOTE — DISCHARGE INSTRUCTIONS
My office will schedule your follow-up appointment. Please call our office ([phone]) or return to ED if you experience fever > 101.5, severe pain, persistent nausea/vomiting, excessive bleeding, inability to urinate, or other concerns. Please take your cipro tablet tonight. Patient Education         A prostate biopsy is a type of test. Your doctor takes about 10 to 12 small tissue samples from your prostate. Then another doctor looks at the tissue under a microscope to see if there are cancer cells. Why is this test done? You may need a prostate biopsy if your doctor found something of concern in your lab work or during your exam. A biopsy can help find out if you have prostate cancer. It may also be done for other reasons, such as monitoring the growth of prostate cancer for men on active surveillance. How do you prepare for the test?  Before you have a prostate biopsy, tell your doctor if you are taking any medicines or using any herbal supplements, or if you are allergic to any medicines. And tell your doctor if you have had bleeding problems, or you take aspirin or some other blood thinner. How is the test done? Some people have a prostate imaging test, such as an MRI or a CT scan, before their biopsy. The test results are used during the biopsy to select the areas of the prostate to sample. Before your biopsy, you may be given antibiotics to prevent infection. You may be asked to take off all of your clothes and put on a hospital gown. You may be given a sedative through a vein (IV) in your arm. The sedative will help you relax and stay still. If you have a general anesthetic, you will be in a recovery room for a few hours after the biopsy. Through the rectum  · You may be asked to kneel, lie on your side, or lie on your back. · Your doctor may inject an anesthetic around and into the prostate to numb the area before samples are taken.   · An ultrasound probe will be gently inserted into your rectum. · A thin tool with a spring-loaded needle will be inserted next to the ultrasound probe. The ultrasound helps to locate the areas on the prostate where the samples will be taken. If you had an MRI or CT scan, the test results will also be used to guide the sampling. · The needle enters the prostate and removes a sample. About 10 to 12 samples are usually taken. Through the perineum  · You will lie on an exam table either on your side or on your back with your knees bent. You will get anesthesia. The anesthesia may make you sleep. Or it may just numb the area being worked on.  · Your doctor will make a small cut in your perineum. Then the doctor will collect samples from the prostate through the cut with a special tool. · An ultrasound probe inserted into the rectum may be used to help find the locations in the prostate where samples need to be taken. Sometimes other imaging, such as MRI, is used instead of or along with ultrasound. Or the test results from other imaging, such as an MRI or a CT scan, may be used to guide the sampling. How long will the test take? This test will take from 15 to 45 minutes, depending on how it's done. What happens after the test?  You will probably be able to go home right away, and you may feel tired the rest of the day. Your muscles may ache. Don't do heavy work or exercise for 4 hours after the test.  You may have mild pain in your pelvic area and blood in your urine for up to 5 days. You may have some blood in your semen for a week or longer. You may also have a change in color of your semen for up to 1 month after the biopsy. If the prostate samples are taken through the rectal wall (transrectal biopsy), you may have a small amount of bleeding from your rectum for 2 to 3 days after the biopsy. Follow-up care is a key part of your treatment and safety. Be sure to make and go to all appointments, and call your doctor if you are having problems.  It's also a good idea to keep a list of the medicines you take. Ask your doctor when you can expect to have your test results. Where can you learn more? Go to http://www.gray.com/  Enter Y504 in the search box to learn more about \"Prostate Biopsy: About This Test.\"  Current as of: December 17, 2020               Content Version: 12.8  © 2006-2021 [employer]. Care instructions adapted under license by [employer] (which disclaims liability or warranty for this information). If you have questions about a medical condition or this instruction, always ask your healthcare professional. [place] [address] any warranty or liability for your use of this information. ACTIVITY  · As tolerated and as directed by your doctor. · Bathe or shower as directed by your doctor. DIET  · Clear liquids until no nausea or vomiting; then light diet for the first day. · Advance to regular diet on second day, unless your doctor orders otherwise. · If nausea and vomiting continues, call your doctor. PAIN  · Take pain medication as directed by your doctor. · Call your doctor if pain is NOT relieved by medication. · DO NOT take aspirin of blood thinners unless directed by your doctor. MEDICATION INTERACTION:During your procedure you potentially received a medication or medications which may reduce the effectiveness of oral contraceptives. Please consider other forms of contraception for 1 month following your procedure if you are currently using oral contraceptives as your primary form of birth control.  In addition to this, we recommend continuing your oral contraceptive as prescribed, unless otherwise instructed by your physician, during this time      [address] IF   · Excessive bleeding that does not stop after holding pressure over the area  · Temperature of 101 degrees F or above  · Excessive redness, swelling or bruising, and/ or green or yellow, smelly discharge from incision    After general anesthesia or intravenous sedation, for 24 hours or while taking prescription Narcotics:  · Limit your activities  · A responsible adult needs to be with you for the next 24 hours  · Do not drive and operate hazardous machinery  · Do not make important personal or business decisions  · Do not drink alcoholic beverages  · If you have not urinated within 8 hours after discharge, and you are experiencing discomfort from urinary retention, please go to the nearest ED. · If you have sleep apnea and have a CPAP machine, please use it for all naps and sleeping. · Please use caution when taking narcotics and any of your home medications that may cause drowsiness. *  Please give a list of your current medications to your Primary Care Provider. *  Please update this list whenever your medications are discontinued, doses are      changed, or new medications (including over-the-counter products) are added. *  Please carry medication information at all times in case of emergency situations. These are general instructions for a healthy lifestyle:  No smoking/ No tobacco products/ Avoid exposure to second hand smoke  Surgeon General's Warning:  Quitting smoking now greatly reduces serious risk to your health. Obesity, smoking, and sedentary lifestyle greatly increases your risk for illness  A healthy diet, regular physical exercise & weight monitoring are important for maintaining a healthy lifestyle    You may be retaining fluid if you have a history of heart failure or if you experience any of the following symptoms:  Weight gain of 3 pounds or more overnight or 5 pounds in a week, increased swelling in our hands or feet or shortness of breath while lying flat in bed. Please call your doctor as soon as you notice any of these symptoms; do not wait until your next office visit.

## 2021-08-04 NOTE — PROGRESS NOTES
Patient: Juliana Holden MRN: [id number]  SSN: [id number]    YOB: 1955  Age: 73 y.o. Sex: male      Other Providers:  Dr. Rambo Hernandez: Elevated PSA    DIAGNOSIS: Favorable intermediate risk prostate cancer, T1c, [place] 3+4=7, PSA 5.8    PREVIOUS RADIATION TREATMENT:  1) None    HISTORY OF PRESENT ILLNESS:  Juliana Holden is a 73 y.o. male who I am seeing at the request of Dr. Chago Horton. Mr. Karla Wade was in his usual state of health until approximately 04/2021 at which time he was found to have an elevated PSA. He was referred by his primary care physician to Dr. Chago Horton for evaluation. A repeat PSA 5/25/21 was elevated to 5.8. An MRI of the prostate 6/12/21 demonstrated a IPRADS-4 lesion in the left lateral mid gland 9. The capsule was intact and there was no abnormal lymphadenopathy. On 7/22/21 he underwent an MRI fused TRUS biopsy. Pathology demonstrated [place] 3+3=6 (2 cores, 10-20%), and [place] 3+4=7 (3 cores, 70-90%, with PNI). He currently takes Flomax and finasteride for LUTS and his IPSS is 20 on these medications. On 7/9/21 he presented to the emergency department with complaints of urinary retention for which a Hauser catheter was placed temporarily. He had a similar episode a few years ago and required catheterization for a week. PAST MEDICAL HISTORY:    Past Medical History:   Diagnosis Date    Arthritis     OA- knees     Emphysema lung ([facility] [address] 2021    \"very mild per scan\" no inhalers    Former smoker, stopped smoking in distant past     Quit 2011    GERD (gastroesophageal reflux disease)     omeprazole & pepcid daily - well controlled    Hypertension     losartan    [facility]       The patient denies history of collagen vascular diseases, pacemaker insertion, prior radiation or prior chemotherapy. His last colonoscopy was approximately 10 years ago and he is in the process of scheduling a follow up.     PAST SURGICAL HISTORY:   Past Surgical History:   Procedure Laterality Date    HX COLONOSCOPY  ~2007     MEDICATIONS:     Current Outpatient Medications:     omeprazole (PRILOSEC) 40 mg capsule, Take 40 mg by mouth daily. , Disp: , Rfl:     famotidine (PEPCID) 40 mg tablet, Take 40 mg by mouth nightly., Disp: , Rfl:     cyclobenzaprine (FLEXERIL) 5 mg tablet, Take 5 mg by mouth as needed. , Disp: , Rfl:     ciprofloxacin HCl (Cipro) 500 mg tablet, Take one the morning of your biopsy and one the evening of your biopsy, Disp: 2 Tablet, Rfl: 0    traMADoL (ULTRAM) 50 mg tablet, Take 50 mg by mouth every eight (8) hours as needed. , Disp: , Rfl:     losartan (COZAAR) 100 mg tablet, Take 100 mg by mouth daily. , Disp: , Rfl:     fluticasone propionate (FLONASE) 50 mcg/actuation nasal spray, 2 Sprays by Nasal route daily as needed. , Disp: , Rfl:     finasteride (PROSCAR) 5 mg tablet, Take 1 Tablet by mouth daily. (Patient taking differently: Take 5 mg by mouth Every morning.), Disp: 90 Tablet, Rfl: 4    LORazepam (ATIVAN) 1 mg tablet, Take 1 mg by mouth nightly., Disp: , Rfl:     tamsulosin (FLOMAX) 0.4 mg capsule, Take 1 Cap by mouth daily. , Disp: 90 Cap, Rfl: 4    ALLERGIES:   Allergies   Allergen Reactions    Monosodium Glutamate Nausea and Vomiting    Other Medication Hives     Red ants    Venom-Yellow Jacket Hives     States no longer has reaction     SOCIAL HISTORY:   Social History     Socioeconomic History    Marital status: SINGLE     Spouse name: Not on file    Number of children: Not on file    Years of education: Not on file    Highest education level: Not on file   Occupational History    Not on file   Tobacco Use    Smoking status: Former Smoker     Packs/day: 1.00     Years: 30.00     Pack years: 30.00     Quit date: 2011     Years since quitting: 10.5    Smokeless tobacco: Never Used   Vaping Use    Vaping Use: Former   Substance and Sexual Activity    Alcohol use:  Yes     Alcohol/week: 36.0 standard drinks Types: 36 Cans of beer per week     Comment: 2-6 beers daily- 14-36 weekly    Drug use: No    Sexual activity: Not on file   Other Topics Concern    Not on file   Social History Narrative    Not on file     Social Determinants of Health     Financial Resource Strain:     Difficulty of Paying Living Expenses:    Food Insecurity:     Worried About Running Out of Food in the Last Year:     [address] in the Last Year:    Transportation Needs:     Lack of Transportation (Medical):  Lack of Transportation (Non-Medical):    Physical Activity:     Days of Exercise per Week:     Minutes of Exercise per Session:    Stress:     Feeling of Stress :    Social Connections:     Frequency of Communication with Friends and Family:     Frequency of Social Gatherings with Friends and Family:     Attends Mormonism Services:     Active Member of Clubs or Organizations:     Attends Club or Organization Meetings:     Marital Status:    Intimate Partner Violence:     Fear of Current or Ex-Partner:     Emotionally Abused:     Physically Abused:     Sexually Abused:      FAMILY HISTORY:   No family history on file. REVIEW OF SYSTEMS:   A full 12-point review of systems was completed and was negative unless noted in the history of present illness.     PHYSICAL EXAMINATION:   ECOG Performance status 0  VITAL SIGNS:   Visit Vitals  /84 (BP 1 Location: Right upper arm, BP Patient Position: Sitting)   Pulse 91   Temp 97.7 °F (36.5 °C)   Wt 93.6 kg (206 lb 6.4 oz)   SpO2 97%   BMI 28.79 kg/m²        General: well developed/nourished adult Male in no acute distress; appears stated age  [de-identified]: normocephalic, atraumatic; EOMI  Neck: supple with full ROM  Respiratory: normal inspiratory effort, no audible wheezes  Extremities: no cyanosis, clubbing, or edema  Musculoskeletal: mobility intact x4; normal ROM in all joints  Skin: no skin lesions identified  Neuro: AOx3; sensation intact x 4; CNII-XII grossly intact  Psych: appropriate affect, insight, and judgement  GI: abdomen soft, non-distended  : deferred given recent exam by Dr. Garcia Began 5/25/21     PATHOLOGY:    I personally reviewed the pathology reports as documented in the HPI. LABORATORY:   Lab Results   Component Value Date/Time    Sodium 138 07/08/2012 09:30 AM    Potassium 4.4 07/08/2012 09:30 AM    Chloride 105 07/08/2012 09:30 AM    CO2 22 (L) 07/08/2012 09:30 AM    Anion gap 11 07/08/2012 09:30 AM    Glucose 97 07/08/2012 09:30 AM    BUN 15 07/08/2012 09:30 AM    Creatinine 1.1 07/08/2012 09:30 AM    GFR est AA >60 07/08/2012 09:30 AM    GFR est non-AA >60 07/08/2012 09:30 AM    Calcium 8.6 07/08/2012 09:30 AM     Lab Results   Component Value Date/Time    WBC 7.4 07/08/2012 09:30 AM    HGB 15.3 07/08/2012 09:30 AM    HCT 43.5 07/08/2012 09:30 AM    PLATELET 634 (L) 58/05/6947 09:30 AM       RADIOLOGY:    I personally reviewed the MRI 6/12/2021 and agree with the findings as documented in the HPI. IMPRESSION:  Ignacia Delgado is a 73 y.o. male with favorable intermediate risk prostate cancer, cT1c, [place] 3+4=7, PSA 5.8 presenting for discussion of treatment options. I had a long discussion with Ignacia Delgado regarding his diagnosis and treatment options. Given his intermediate risk disease and otherwise good health I recommended treatment over active surveillance and discussed that his cancer control should be similar with either surgery or radiation. I reviewed in detail radiation modalities including brachytherapy, standard external beam radiation, moderately hypofractionated radiation, and stereotactic body radiosurgery. Given his IPSS of 20 I recommended against brachytherapy and SBRT. I believe he would tolerate a course of moderately hypofractionated external beam radiation but reviewed the likelihood of worsening urinary symptoms during treatment which may not completely return to baseline.  I also discussed the possibility of acute and late GI toxicities as well as the risk of worsening erectile function. Elva Youssef and his sister had the opportunity to ask questions which appeared to be answered to their satisfaction. He will consider his options and contact our department with any questions, concerns, or if he wishes to proceed with treatment.     PLAN:    1) Follow up with Dr. Brii Wylie 8/30/21  2) Follow up in radiation oncology with any additional questions or to proceed with treatment      Pearly Ahumada, MD   August 5, 2021

## 2021-08-05 ENCOUNTER — HOSPITAL ENCOUNTER (OUTPATIENT)
Dept: RADIATION ONCOLOGY | Age: 66
Discharge: HOME | End: 2021-08-05
Payer: MEDICARE

## 2021-08-05 VITALS
SYSTOLIC BLOOD PRESSURE: 127 MMHG | WEIGHT: 206.4 LBS | HEART RATE: 91 BPM | OXYGEN SATURATION: 97 % | BODY MASS INDEX: 28.79 KG/M2 | DIASTOLIC BLOOD PRESSURE: 84 MMHG | TEMPERATURE: 97.7 F

## 2021-08-05 PROCEDURE — 99211 OFF/OP EST MAY X REQ PHY/QHP: CPT

## 2021-08-05 NOTE — NURSE NAVIGATOR
Consult prostate cancer. Sister present for consult. Last psa 5-25-21- 5.8. MRI pelvis 6-12-21. Pathology 7-22-21. F/u Dr. Paz Quevedo 8-30-21. Pt has history of urinary retention requiring michael placement. Pt has chronic  back pain. Level 4 at present. Took Tramadol today. Pt was c/o pain from dental issue. Made apt during consult to see dentist after this apt.      Georgi Villegas RN

## 2021-08-28 ENCOUNTER — HOSPITAL ENCOUNTER (EMERGENCY)
Age: 66
Discharge: HOME | End: 2021-08-28
Attending: EMERGENCY MEDICINE
Payer: MEDICARE

## 2021-08-28 ENCOUNTER — APPOINTMENT (OUTPATIENT)
Dept: GENERAL RADIOLOGY | Age: 66
End: 2021-08-28
Attending: EMERGENCY MEDICINE
Payer: MEDICARE

## 2021-08-28 VITALS
TEMPERATURE: 97.8 F | HEART RATE: 82 BPM | DIASTOLIC BLOOD PRESSURE: 71 MMHG | WEIGHT: 204 LBS | HEIGHT: 71 IN | SYSTOLIC BLOOD PRESSURE: 148 MMHG | RESPIRATION RATE: 16 BRPM | OXYGEN SATURATION: 96 % | BODY MASS INDEX: 28.56 KG/M2

## 2021-08-28 DIAGNOSIS — S92.912A CLOSED DISPLACED FRACTURE OF PHALANX OF TOE OF LEFT FOOT, UNSPECIFIED TOE, INITIAL ENCOUNTER: Primary | ICD-10-CM

## 2021-08-28 PROCEDURE — 73630 X-RAY EXAM OF FOOT: CPT

## 2021-08-28 PROCEDURE — 99282 EMERGENCY DEPT VISIT SF MDM: CPT

## 2021-08-28 RX ORDER — HYDROCODONE BITARTRATE AND ACETAMINOPHEN 5; 325 MG/1; MG/1
1 TABLET ORAL
Qty: 6 TABLET | Refills: 0 | Status: SHIPPED | OUTPATIENT
Start: 2021-08-28 | End: 2021-08-30

## 2021-08-28 NOTE — ED NOTES
I have reviewed discharge instructions with the patient. The patient verbalized understanding. Patient left ED via Discharge Method: ambulatory to Home with self. Opportunity for questions and clarification provided. Patient given 1 scripts. To continue your aftercare when you leave the hospital, you may receive an automated call from our care team to check in on how you are doing. This is a free service and part of our promise to provide the best care and service to meet your aftercare needs.  If you have questions, or wish to unsubscribe from this service please call [phone]. Thank you for Choosing our [facility] Emergency Department.

## 2021-08-28 NOTE — ED TRIAGE NOTES
Pt ambulatory to triage yesterday. Pt states stubbed his left 4th toe yesterday and has foot pain and toe pain with swelling.

## 2021-08-28 NOTE — ED PROVIDER NOTES
81-year-old white male presents with left foot pain after accidentally kicking a weights that yesterday afternoon. Pain is mostly around the great toe but involves all of his toes. No ankle pain. Has been able to ambulate in spite of the pain. Pain is aggravated by palpation and movement. The history is provided by the patient. Toe Pain   Pertinent negatives include no numbness, no back pain and no neck pain. Foot Pain   Pertinent negatives include no numbness, no back pain and no neck pain. Past Medical History:   Diagnosis Date    Arthritis     OA- knees     Cancer (HCC)     prostate cancer    Chronic pain     back. takes Tramadol    Emphysema lung ([address] 2021    \"very mild per scan\" no inhalers    Former smoker, stopped smoking in distant past     Quit 2011    GERD (gastroesophageal reflux disease)     omeprazole & pepcid daily - well controlled    Hypertension     losartan    [facility]       Past Surgical History:   Procedure Laterality Date    HX COLONOSCOPY  ~2007         No family history on file. Social History     Socioeconomic History    Marital status: SINGLE     Spouse name: Not on file    Number of children: Not on file    Years of education: Not on file    Highest education level: Not on file   Occupational History    Not on file   Tobacco Use    Smoking status: Former Smoker     Packs/day: 1.00     Years: 30.00     Pack years: 30.00     Quit date: 2011     Years since quitting: 10.6    Smokeless tobacco: Never Used   Vaping Use    Vaping Use: Former   Substance and Sexual Activity    Alcohol use:  Yes     Alcohol/week: 36.0 standard drinks     Types: 36 Cans of beer per week     Comment: 2-6 beers daily- 14-36 weekly    Drug use: No    Sexual activity: Not on file   Other Topics Concern     Service Not Asked    Blood Transfusions Not Asked    Caffeine Concern Not Asked    Occupational Exposure Not Asked   [facility] Hazards Not Asked  Sleep Concern Not Asked    Stress Concern Not Asked    Weight Concern Not Asked    Special Diet Not Asked    Back Care Not Asked    Exercise Not Asked    Bike Helmet Not Asked   [address] Not Asked    Self-Exams Not Asked   Social History Narrative    Not on file     Social Determinants of Health     Financial Resource Strain:     Difficulty of Paying Living Expenses:    Food Insecurity:     Worried About Running Out of Food in the Last Year:     [address] in the Last Year:    Transportation Needs:     Lack of Transportation (Medical):  Lack of Transportation (Non-Medical):    Physical Activity:     Days of Exercise per Week:     Minutes of Exercise per Session:    Stress:     Feeling of Stress :    Social Connections:     Frequency of Communication with Friends and Family:     Frequency of Social Gatherings with Friends and Family:     Attends Taoism Services:     Active Member of Clubs or Organizations:     Attends Club or Organization Meetings:     Marital Status:    Intimate Partner Violence:     Fear of Current or Ex-Partner:     Emotionally Abused:     Physically Abused:     Sexually Abused: ALLERGIES: Monosodium glutamate, Other medication, and Venom-yellow jacket    Review of Systems   Cardiovascular: Negative for chest pain. Musculoskeletal: Negative for back pain and neck pain. Neurological: Negative for weakness, numbness and headaches. Vitals:    08/28/21 1302   BP: (!) 148/71   Pulse: 82   Resp: 16   Temp: 97.8 °F (36.6 °C)   SpO2: 96%   Weight: 92.5 kg (204 lb)   Height: 5' 11\" (1.803 m)            Physical Exam  Vitals and nursing note reviewed. Constitutional:       General: He is not in acute distress. Appearance: Normal appearance. He is not toxic-appearing. HENT:      Head: Normocephalic and atraumatic. Cardiovascular:      Rate and Rhythm: Normal rate.    Pulmonary:      Effort: Pulmonary effort is normal.   Musculoskeletal: Comments: Left foot has no bruising swelling or deformity but does have tenderness to all of his toes. Good pulses sensation and movement. Ankle and proximal foot nontender. Neurological:      Mental Status: He is alert and oriented to person, place, and time. Psychiatric:         Mood and Affect: Mood normal.         Behavior: Behavior normal.          MDM  Number of Diagnoses or Management Options  Closed displaced fracture of phalanx of toe of left foot, unspecified toe, initial encounter  Diagnosis management comments: X-ray shows minimally displaced fracture of proximal end of the proximal phalanx of the fourth toe. Does not involve the joint surface. Patient appears safe for discharge home.   Will give pain medicine and orthopedic referral.       Amount and/or Complexity of Data Reviewed  Tests in the radiology section of CPT®: ordered and reviewed    Risk of Complications, Morbidity, and/or Mortality  Presenting problems: low  Diagnostic procedures: low  Management options: low           Procedures

## 2021-09-09 ENCOUNTER — HOSPITAL ENCOUNTER (OUTPATIENT)
Dept: SURGERY | Age: 66
Discharge: HOME | End: 2021-09-09
Attending: UROLOGY
Payer: MEDICARE

## 2021-09-09 VITALS
OXYGEN SATURATION: 98 % | HEART RATE: 62 BPM | RESPIRATION RATE: 20 BRPM | HEIGHT: 69 IN | DIASTOLIC BLOOD PRESSURE: 85 MMHG | TEMPERATURE: 97.1 F | BODY MASS INDEX: 30.47 KG/M2 | SYSTOLIC BLOOD PRESSURE: 133 MMHG | WEIGHT: 205.7 LBS

## 2021-09-09 LAB
ANION GAP SERPL CALC-SCNC: 7 MMOL/L (ref 7–16)
ATRIAL RATE: 57 BPM
BUN SERPL-MCNC: 20 MG/DL (ref 8–23)
CALCIUM SERPL-MCNC: 8.7 MG/DL (ref 8.3–10.4)
CALCULATED P AXIS, ECG09: 3 DEGREES
CALCULATED R AXIS, ECG10: -18 DEGREES
CALCULATED T AXIS, ECG11: 59 DEGREES
CHLORIDE SERPL-SCNC: 107 MMOL/L (ref 98–107)
CO2 SERPL-SCNC: 24 MMOL/L (ref 21–32)
CREAT SERPL-MCNC: 1 MG/DL (ref 0.8–1.5)
DIAGNOSIS, 93000: NORMAL
ERYTHROCYTE [DISTWIDTH] IN BLOOD BY AUTOMATED COUNT: 11.9 % (ref 11.9–14.6)
GLUCOSE SERPL-MCNC: 100 MG/DL (ref 65–100)
HCT VFR BLD AUTO: 41.2 % (ref 41.1–50.3)
HGB BLD-MCNC: 14.3 G/DL (ref 13.6–17.2)
MCH RBC QN AUTO: 34.1 PG (ref 26.1–32.9)
MCHC RBC AUTO-ENTMCNC: 34.7 G/DL (ref 31.4–35)
MCV RBC AUTO: 98.3 FL (ref 79.6–97.8)
NRBC # BLD: 0 K/UL (ref 0–0.2)
P-R INTERVAL, ECG05: 238 MS
PLATELET # BLD AUTO: 148 K/UL (ref 150–450)
PMV BLD AUTO: 12.2 FL (ref 9.4–12.3)
POTASSIUM SERPL-SCNC: 4 MMOL/L (ref 3.5–5.1)
Q-T INTERVAL, ECG07: 440 MS
QRS DURATION, ECG06: 108 MS
QTC CALCULATION (BEZET), ECG08: 428 MS
RBC # BLD AUTO: 4.19 M/UL (ref 4.23–5.6)
SODIUM SERPL-SCNC: 138 MMOL/L (ref 136–145)
VENTRICULAR RATE, ECG03: 57 BPM
WBC # BLD AUTO: 6.4 K/UL (ref 4.3–11.1)

## 2021-09-09 PROCEDURE — 93005 ELECTROCARDIOGRAM TRACING: CPT

## 2021-09-09 PROCEDURE — 80048 BASIC METABOLIC PNL TOTAL CA: CPT

## 2021-09-09 PROCEDURE — 85027 COMPLETE CBC AUTOMATED: CPT

## 2021-09-09 NOTE — PERIOP NOTES
Recent Results (from the past 12 hour(s))   EKG, 12 LEAD, INITIAL    Collection Time: 09/09/21  8:35 AM   Result Value Ref Range    Ventricular Rate 57 BPM    Atrial Rate 57 BPM    P-R Interval 238 ms    QRS Duration 108 ms    Q-T Interval 440 ms    QTC Calculation (Bezet) 428 ms    Calculated P Axis 3 degrees    Calculated R Axis -18 degrees    Calculated T Axis 59 degrees    Diagnosis       Sinus bradycardia with 1st degree A-V block  Otherwise normal ECG  When compared with ECG of 08-JUL-2010 15:26,  LA interval has increased  Vent. rate has decreased BY  38 BPM  Borderline criteria for Inferior infarct are no longer Present  Confirmed by Marissa Delgado MD (), ISIAH ([id number]) on 9/9/2021 12:19:43 PM     CBC W/O DIFF    Collection Time: 09/09/21  8:52 AM   Result Value Ref Range    WBC 6.4 4.3 - 11.1 K/uL    RBC 4.19 (L) 4.23 - 5.6 M/uL    HGB 14.3 13.6 - 17.2 g/dL    HCT 41.2 41.1 - 50.3 %    MCV 98.3 (H) 79.6 - 97.8 FL    MCH 34.1 (H) 26.1 - 32.9 PG    MCHC 34.7 31.4 - 35.0 g/dL    RDW 11.9 11.9 - 14.6 %    PLATELET 706 (L) 201 - 450 K/uL    MPV 12.2 9.4 - 12.3 FL    ABSOLUTE NRBC 0.00 0.0 - 0.2 K/uL   METABOLIC PANEL, BASIC    Collection Time: 09/09/21  8:52 AM   Result Value Ref Range    Sodium 138 136 - 145 mmol/L    Potassium 4.0 3.5 - 5.1 mmol/L    Chloride 107 98 - 107 mmol/L    CO2 24 21 - 32 mmol/L    Anion gap 7 7 - 16 mmol/L    Glucose 100 65 - 100 mg/dL    BUN 20 8 - 23 MG/DL    Creatinine 1.00 0.8 - 1.5 MG/DL    GFR est AA >60 >60 ml/min/1.73m2    GFR est non-AA >60 >60 ml/min/1.73m2    Calcium 8.7 8.3 - 10.4 MG/DL     Labs reviewed. Routing results to surgeon.

## 2021-09-09 NOTE — PERIOP NOTES
PLEASE CONTINUE TAKING ALL PRESCRIPTION MEDICATIONS UP TO THE DAY OF SURGERY UNLESS OTHERWISE DIRECTED BELOW. DISCONTINUE all vitamins and supplements 7 days prior to surgery. DISCONTINUE Non-Steriodal Anti-Inflammatory (NSAIDS) such as Advil and Aleve 5 days prior to surgery. Home Medications to take  the day of surgery   Finasteride/proscar  Omeprazole/prilosec  Tamsulosin/flomax  **IF NEEDED: may take tramadol and flonase      9/15/2021: On the day before surgery please take Acetaminophen 1000mg in the morning and then again before bed. You may substitute for Tylenol 650 mg. Home Medications   to Hold   DISCONTINUE all vitamins and supplements 7 days prior to surgery. DISCONTINUE Non-Steriodal Anti-Inflammatory (NSAIDS) such as Advil and Aleve 5 days prior to surgery. Comments    [facility] [address] [place] [address]               Please do not bring home medications with you on the day of surgery unless otherwise directed by your nurse. If you are instructed to bring home medications, please give them to your nurse as they will be administered by the nursing staff. If you have any questions, please call [facility] [phone] or [facility] [phone]. A copy of this note was provided to the patient for reference.

## 2021-09-09 NOTE — PERIOP NOTES
Patient verified name and     Order for consent NOT found in EHR; patient verified procedure from case posting. Type 3 surgery, in person PAT assessment complete. Labs per surgeon: Unknown  Labs per anesthesia protocol: CBC no diff, BMP  [id number] Sinus Bradycardia with 1st degree AV block; per protocol does not need to be seen by anesthesia. Patient COVID test date 2021 at 3 pm at [employer] location; Patient has had ONE [employer] covid vaccine given on 8/10/2021; is scheduled for second vaccination on 2021. Asked patient to call surgeon and discuss receiving 2nd covid vaccine two days prior to surgery. Patient verbalizes understanding. Hospital approved surgical skin cleanser and instructions given per hospital policy. Patient provided with and instructed on educational handouts including Guide to Surgery, Pain Management, Hand Hygiene, Blood Transfusion Education, and [place] [facility] Brochure. Patient answered medical/surgical history questions at their best of ability. All prior to admission medications documented in [facility]. Original medication prescription medication list reconciled with patient during patient appointment. Patient instructed to hold all vitamins 7 days prior to surgery and NSAIDS 5 days prior to surgery, patient verbalized understanding. Patient teach back successful and patient demonstrates knowledge of instructions.

## 2021-11-01 ENCOUNTER — ANESTHESIA EVENT (OUTPATIENT)
Dept: SURGERY | Age: 66
DRG: 708 | End: 2021-11-01
Payer: MEDICARE

## 2021-11-02 ENCOUNTER — HOSPITAL ENCOUNTER (INPATIENT)
Age: 66
LOS: 1 days | Discharge: HOME | DRG: 708 | End: 2021-11-03
Attending: UROLOGY | Admitting: UROLOGY
Payer: MEDICARE

## 2021-11-02 ENCOUNTER — ANESTHESIA (OUTPATIENT)
Dept: SURGERY | Age: 66
DRG: 708 | End: 2021-11-02
Payer: MEDICARE

## 2021-11-02 DIAGNOSIS — K40.90 INGUINAL HERNIA OF LEFT SIDE WITHOUT OBSTRUCTION OR GANGRENE: ICD-10-CM

## 2021-11-02 DIAGNOSIS — Z90.79 S/P PROSTATECTOMY: Primary | ICD-10-CM

## 2021-11-02 DIAGNOSIS — C61 PROSTATE CANCER (HCC): ICD-10-CM

## 2021-11-02 LAB
ABO + RH BLD: NORMAL
BLOOD GROUP ANTIBODIES SERPL: NORMAL
SPECIMEN EXP DATE BLD: NORMAL

## 2021-11-02 PROCEDURE — 86901 BLOOD TYPING SEROLOGIC RH(D): CPT

## 2021-11-02 PROCEDURE — 77030016151 HC PROTCTR LNS DFOG COVD -B: Performed by: UROLOGY

## 2021-11-02 PROCEDURE — 76210000016 HC OR PH I REC 1 TO 1.5 HR: Performed by: UROLOGY

## 2021-11-02 PROCEDURE — 77030002966 HC SUT PDS J&J -A: Performed by: UROLOGY

## 2021-11-02 PROCEDURE — 88307 TISSUE EXAM BY PATHOLOGIST: CPT

## 2021-11-02 PROCEDURE — 77030018720 HC DVC LIGSR ATLS COVD -E: Performed by: UROLOGY

## 2021-11-02 PROCEDURE — 65270000029 HC RM PRIVATE

## 2021-11-02 PROCEDURE — 77030039147 HC PWDR HEMSTS SURGICEL JNJ -D: Performed by: UROLOGY

## 2021-11-02 PROCEDURE — 77030008522 HC TBNG INSUF LAPRO STRY -B: Performed by: UROLOGY

## 2021-11-02 PROCEDURE — 74011250636 HC RX REV CODE- 250/636: Performed by: NURSE ANESTHETIST, CERTIFIED REGISTERED

## 2021-11-02 PROCEDURE — 74011250637 HC RX REV CODE- 250/637: Performed by: UROLOGY

## 2021-11-02 PROCEDURE — 74011250636 HC RX REV CODE- 250/636: Performed by: ANESTHESIOLOGY

## 2021-11-02 PROCEDURE — 77030003575 HC NDL INSUF ENDOPTH J&J -B: Performed by: UROLOGY

## 2021-11-02 PROCEDURE — 76010000878 HC OR TIME 3 TO 3.5HR INTENSV - TIER 2: Performed by: UROLOGY

## 2021-11-02 PROCEDURE — 74011000250 HC RX REV CODE- 250: Performed by: NURSE ANESTHETIST, CERTIFIED REGISTERED

## 2021-11-02 PROCEDURE — 88305 TISSUE EXAM BY PATHOLOGIST: CPT

## 2021-11-02 PROCEDURE — 77030040831 HC BAG URINE DRNG MDII -A: Performed by: UROLOGY

## 2021-11-02 PROCEDURE — 77030037088 HC TUBE ENDOTRACH ORAL NSL COVD-A: Performed by: NURSE ANESTHETIST, CERTIFIED REGISTERED

## 2021-11-02 PROCEDURE — 77030007955 HC PCH ENDOSC SPEC J&J -B: Performed by: UROLOGY

## 2021-11-02 PROCEDURE — 38770 REMOVE PELVIS LYMPH NODES: CPT | Performed by: UROLOGY

## 2021-11-02 PROCEDURE — 74011250636 HC RX REV CODE- 250/636: Performed by: UROLOGY

## 2021-11-02 PROCEDURE — 76060000038 HC ANESTHESIA 3.5 TO 4 HR: Performed by: UROLOGY

## 2021-11-02 PROCEDURE — 77030012894: Performed by: UROLOGY

## 2021-11-02 PROCEDURE — 77030022704 HC SUT VLOC COVD -B: Performed by: UROLOGY

## 2021-11-02 PROCEDURE — 77030010935 HC CLP LIG ABSRB TELE -B: Performed by: UROLOGY

## 2021-11-02 PROCEDURE — 74011000250 HC RX REV CODE- 250: Performed by: UROLOGY

## 2021-11-02 PROCEDURE — 77030002896 HC SUT CLP ABSRB J&J -B: Performed by: UROLOGY

## 2021-11-02 PROCEDURE — 77030035277 HC OBTRTR BLDELSS DISP INTU -B: Performed by: UROLOGY

## 2021-11-02 PROCEDURE — 2709999900 HC NON-CHARGEABLE SUPPLY: Performed by: UROLOGY

## 2021-11-02 PROCEDURE — 77030020703 HC SEAL CANN DISP INTU -B: Performed by: UROLOGY

## 2021-11-02 PROCEDURE — 77030019908 HC STETH ESOPH SIMS -A: Performed by: NURSE ANESTHETIST, CERTIFIED REGISTERED

## 2021-11-02 PROCEDURE — 77030040922 HC BLNKT HYPOTHRM STRY -A: Performed by: NURSE ANESTHETIST, CERTIFIED REGISTERED

## 2021-11-02 PROCEDURE — 77030008606 HC TRCR ENDOSC KII AMR -B: Performed by: UROLOGY

## 2021-11-02 PROCEDURE — 8E0W4CZ ROBOTIC ASSISTED PROCEDURE OF TRUNK REGION, PERCUTANEOUS ENDOSCOPIC APPROACH: ICD-10-PCS | Performed by: UROLOGY

## 2021-11-02 PROCEDURE — 77030002933 HC SUT MCRYL J&J -A: Performed by: UROLOGY

## 2021-11-02 PROCEDURE — 55866 LAPS SURG PRST8ECT RPBIC RAD: CPT | Performed by: UROLOGY

## 2021-11-02 PROCEDURE — 74011250637 HC RX REV CODE- 250/637: Performed by: ANESTHESIOLOGY

## 2021-11-02 PROCEDURE — 77030039425 HC BLD LARYNG TRULITE DISP TELE -A: Performed by: NURSE ANESTHETIST, CERTIFIED REGISTERED

## 2021-11-02 PROCEDURE — 07BC4ZX EXCISION OF PELVIS LYMPHATIC, PERCUTANEOUS ENDOSCOPIC APPROACH, DIAGNOSTIC: ICD-10-PCS | Performed by: UROLOGY

## 2021-11-02 PROCEDURE — 49505 PRP I/HERN INIT REDUC >5 YR: CPT | Performed by: UROLOGY

## 2021-11-02 PROCEDURE — 77030008603 HC TRCR ENDOSC EPATH J&J -C: Performed by: UROLOGY

## 2021-11-02 PROCEDURE — 77030040830 HC CATH URETH FOL MDII -A: Performed by: UROLOGY

## 2021-11-02 PROCEDURE — 0VT04ZZ RESECTION OF PROSTATE, PERCUTANEOUS ENDOSCOPIC APPROACH: ICD-10-PCS | Performed by: UROLOGY

## 2021-11-02 PROCEDURE — 77030038552 HC DRN WND MDII -A: Performed by: UROLOGY

## 2021-11-02 RX ORDER — ONDANSETRON 2 MG/ML
INJECTION INTRAMUSCULAR; INTRAVENOUS AS NEEDED
Status: DISCONTINUED | OUTPATIENT
Start: 2021-11-02 | End: 2021-11-02 | Stop reason: HOSPADM

## 2021-11-02 RX ORDER — HYDROMORPHONE HYDROCHLORIDE 2 MG/ML
0.5 INJECTION, SOLUTION INTRAMUSCULAR; INTRAVENOUS; SUBCUTANEOUS
Status: COMPLETED | OUTPATIENT
Start: 2021-11-02 | End: 2021-11-02

## 2021-11-02 RX ORDER — DEXAMETHASONE SODIUM PHOSPHATE 4 MG/ML
INJECTION, SOLUTION INTRA-ARTICULAR; INTRALESIONAL; INTRAMUSCULAR; INTRAVENOUS; SOFT TISSUE AS NEEDED
Status: DISCONTINUED | OUTPATIENT
Start: 2021-11-02 | End: 2021-11-02 | Stop reason: HOSPADM

## 2021-11-02 RX ORDER — DIPHENHYDRAMINE HYDROCHLORIDE 50 MG/ML
12.5 INJECTION, SOLUTION INTRAMUSCULAR; INTRAVENOUS
Status: DISCONTINUED | OUTPATIENT
Start: 2021-11-02 | End: 2021-11-03 | Stop reason: HOSPADM

## 2021-11-02 RX ORDER — SODIUM CHLORIDE 0.9 % (FLUSH) 0.9 %
5-40 SYRINGE (ML) INJECTION EVERY 8 HOURS
Status: DISCONTINUED | OUTPATIENT
Start: 2021-11-02 | End: 2021-11-03 | Stop reason: HOSPADM

## 2021-11-02 RX ORDER — ONDANSETRON 2 MG/ML
4 INJECTION INTRAMUSCULAR; INTRAVENOUS
Status: DISCONTINUED | OUTPATIENT
Start: 2021-11-02 | End: 2021-11-03 | Stop reason: HOSPADM

## 2021-11-02 RX ORDER — NALOXONE HYDROCHLORIDE 0.4 MG/ML
0.2 INJECTION, SOLUTION INTRAMUSCULAR; INTRAVENOUS; SUBCUTANEOUS AS NEEDED
Status: DISCONTINUED | OUTPATIENT
Start: 2021-11-02 | End: 2021-11-02 | Stop reason: HOSPADM

## 2021-11-02 RX ORDER — NALOXONE HYDROCHLORIDE 0.4 MG/ML
0.2 INJECTION, SOLUTION INTRAMUSCULAR; INTRAVENOUS; SUBCUTANEOUS
Status: DISCONTINUED | OUTPATIENT
Start: 2021-11-02 | End: 2021-11-02 | Stop reason: HOSPADM

## 2021-11-02 RX ORDER — EPHEDRINE SULFATE/0.9% NACL/PF 50 MG/5 ML
SYRINGE (ML) INTRAVENOUS AS NEEDED
Status: DISCONTINUED | OUTPATIENT
Start: 2021-11-02 | End: 2021-11-02 | Stop reason: HOSPADM

## 2021-11-02 RX ORDER — FENTANYL CITRATE 50 UG/ML
100 INJECTION, SOLUTION INTRAMUSCULAR; INTRAVENOUS ONCE
Status: DISCONTINUED | OUTPATIENT
Start: 2021-11-02 | End: 2021-11-02 | Stop reason: HOSPADM

## 2021-11-02 RX ORDER — PANTOPRAZOLE SODIUM 40 MG/1
40 TABLET, DELAYED RELEASE ORAL
Status: DISCONTINUED | OUTPATIENT
Start: 2021-11-03 | End: 2021-11-03 | Stop reason: HOSPADM

## 2021-11-02 RX ORDER — ACETAMINOPHEN 500 MG
1000 TABLET ORAL ONCE
Status: COMPLETED | OUTPATIENT
Start: 2021-11-02 | End: 2021-11-02

## 2021-11-02 RX ORDER — CYCLOBENZAPRINE HCL 5 MG
5 TABLET ORAL
Status: DISCONTINUED | OUTPATIENT
Start: 2021-11-02 | End: 2021-11-03 | Stop reason: HOSPADM

## 2021-11-02 RX ORDER — LIDOCAINE HYDROCHLORIDE 20 MG/ML
INJECTION, SOLUTION EPIDURAL; INFILTRATION; INTRACAUDAL; PERINEURAL AS NEEDED
Status: DISCONTINUED | OUTPATIENT
Start: 2021-11-02 | End: 2021-11-02 | Stop reason: HOSPADM

## 2021-11-02 RX ORDER — KETAMINE HYDROCHLORIDE 50 MG/ML
INJECTION, SOLUTION INTRAMUSCULAR; INTRAVENOUS AS NEEDED
Status: DISCONTINUED | OUTPATIENT
Start: 2021-11-02 | End: 2021-11-02 | Stop reason: HOSPADM

## 2021-11-02 RX ORDER — MIDAZOLAM HYDROCHLORIDE 1 MG/ML
2 INJECTION, SOLUTION INTRAMUSCULAR; INTRAVENOUS ONCE
Status: DISCONTINUED | OUTPATIENT
Start: 2021-11-02 | End: 2021-11-02 | Stop reason: HOSPADM

## 2021-11-02 RX ORDER — HALOPERIDOL 5 MG/ML
1 INJECTION INTRAMUSCULAR
Status: COMPLETED | OUTPATIENT
Start: 2021-11-02 | End: 2021-11-02

## 2021-11-02 RX ORDER — HYDROCODONE BITARTRATE AND ACETAMINOPHEN 5; 325 MG/1; MG/1
1 TABLET ORAL
Status: DISCONTINUED | OUTPATIENT
Start: 2021-11-02 | End: 2021-11-03 | Stop reason: HOSPADM

## 2021-11-02 RX ORDER — ACETAMINOPHEN 325 MG/1
650 TABLET ORAL
Status: DISCONTINUED | OUTPATIENT
Start: 2021-11-02 | End: 2021-11-03 | Stop reason: HOSPADM

## 2021-11-02 RX ORDER — OXYCODONE HYDROCHLORIDE 5 MG/1
5 TABLET ORAL
Status: COMPLETED | OUTPATIENT
Start: 2021-11-02 | End: 2021-11-02

## 2021-11-02 RX ORDER — PROPOFOL 10 MG/ML
INJECTION, EMULSION INTRAVENOUS AS NEEDED
Status: DISCONTINUED | OUTPATIENT
Start: 2021-11-02 | End: 2021-11-02 | Stop reason: HOSPADM

## 2021-11-02 RX ORDER — ONDANSETRON 2 MG/ML
4 INJECTION INTRAMUSCULAR; INTRAVENOUS
Status: DISCONTINUED | OUTPATIENT
Start: 2021-11-02 | End: 2021-11-02 | Stop reason: HOSPADM

## 2021-11-02 RX ORDER — MIDAZOLAM HYDROCHLORIDE 1 MG/ML
2 INJECTION, SOLUTION INTRAMUSCULAR; INTRAVENOUS
Status: DISCONTINUED | OUTPATIENT
Start: 2021-11-02 | End: 2021-11-02 | Stop reason: HOSPADM

## 2021-11-02 RX ORDER — SODIUM CHLORIDE, SODIUM LACTATE, POTASSIUM CHLORIDE, CALCIUM CHLORIDE 600; 310; 30; 20 MG/100ML; MG/100ML; MG/100ML; MG/100ML
25 INJECTION, SOLUTION INTRAVENOUS CONTINUOUS
Status: DISCONTINUED | OUTPATIENT
Start: 2021-11-02 | End: 2021-11-02 | Stop reason: HOSPADM

## 2021-11-02 RX ORDER — FENTANYL CITRATE 50 UG/ML
INJECTION, SOLUTION INTRAMUSCULAR; INTRAVENOUS AS NEEDED
Status: DISCONTINUED | OUTPATIENT
Start: 2021-11-02 | End: 2021-11-02 | Stop reason: HOSPADM

## 2021-11-02 RX ORDER — LIDOCAINE HYDROCHLORIDE 10 MG/ML
0.1 INJECTION INFILTRATION; PERINEURAL AS NEEDED
Status: DISCONTINUED | OUTPATIENT
Start: 2021-11-02 | End: 2021-11-02 | Stop reason: HOSPADM

## 2021-11-02 RX ORDER — DEXTROSE MONOHYDRATE AND SODIUM CHLORIDE 5; .45 G/100ML; G/100ML
75 INJECTION, SOLUTION INTRAVENOUS CONTINUOUS
Status: DISCONTINUED | OUTPATIENT
Start: 2021-11-02 | End: 2021-11-03

## 2021-11-02 RX ORDER — GLYCOPYRROLATE 0.2 MG/ML
INJECTION INTRAMUSCULAR; INTRAVENOUS AS NEEDED
Status: DISCONTINUED | OUTPATIENT
Start: 2021-11-02 | End: 2021-11-02 | Stop reason: HOSPADM

## 2021-11-02 RX ORDER — HYDROMORPHONE HYDROCHLORIDE 1 MG/ML
1 INJECTION, SOLUTION INTRAMUSCULAR; INTRAVENOUS; SUBCUTANEOUS
Status: DISCONTINUED | OUTPATIENT
Start: 2021-11-02 | End: 2021-11-03 | Stop reason: HOSPADM

## 2021-11-02 RX ORDER — OXYBUTYNIN CHLORIDE 10 MG/1
10 TABLET, EXTENDED RELEASE ORAL DAILY
Status: DISCONTINUED | OUTPATIENT
Start: 2021-11-02 | End: 2021-11-03 | Stop reason: HOSPADM

## 2021-11-02 RX ORDER — MIDAZOLAM HYDROCHLORIDE 1 MG/ML
INJECTION, SOLUTION INTRAMUSCULAR; INTRAVENOUS AS NEEDED
Status: DISCONTINUED | OUTPATIENT
Start: 2021-11-02 | End: 2021-11-02 | Stop reason: HOSPADM

## 2021-11-02 RX ORDER — SODIUM CHLORIDE 0.9 % (FLUSH) 0.9 %
5-40 SYRINGE (ML) INJECTION AS NEEDED
Status: DISCONTINUED | OUTPATIENT
Start: 2021-11-02 | End: 2021-11-03 | Stop reason: HOSPADM

## 2021-11-02 RX ORDER — SODIUM CHLORIDE, SODIUM LACTATE, POTASSIUM CHLORIDE, CALCIUM CHLORIDE 600; 310; 30; 20 MG/100ML; MG/100ML; MG/100ML; MG/100ML
75 INJECTION, SOLUTION INTRAVENOUS CONTINUOUS
Status: DISCONTINUED | OUTPATIENT
Start: 2021-11-02 | End: 2021-11-02 | Stop reason: HOSPADM

## 2021-11-02 RX ORDER — NALOXONE HYDROCHLORIDE 0.4 MG/ML
0.4 INJECTION, SOLUTION INTRAMUSCULAR; INTRAVENOUS; SUBCUTANEOUS AS NEEDED
Status: DISCONTINUED | OUTPATIENT
Start: 2021-11-02 | End: 2021-11-03 | Stop reason: HOSPADM

## 2021-11-02 RX ORDER — ATROPA BELLADONNA AND OPIUM 16.2; 6 MG/1; MG/1
SUPPOSITORY RECTAL AS NEEDED
Status: DISCONTINUED | OUTPATIENT
Start: 2021-11-02 | End: 2021-11-02 | Stop reason: HOSPADM

## 2021-11-02 RX ORDER — ROCURONIUM BROMIDE 10 MG/ML
INJECTION, SOLUTION INTRAVENOUS AS NEEDED
Status: DISCONTINUED | OUTPATIENT
Start: 2021-11-02 | End: 2021-11-02 | Stop reason: HOSPADM

## 2021-11-02 RX ORDER — NEOSTIGMINE METHYLSULFATE 1 MG/ML
INJECTION, SOLUTION INTRAVENOUS AS NEEDED
Status: DISCONTINUED | OUTPATIENT
Start: 2021-11-02 | End: 2021-11-02 | Stop reason: HOSPADM

## 2021-11-02 RX ORDER — FAMOTIDINE 20 MG/1
40 TABLET, FILM COATED ORAL
Status: DISCONTINUED | OUTPATIENT
Start: 2021-11-03 | End: 2021-11-03 | Stop reason: HOSPADM

## 2021-11-02 RX ORDER — CEFAZOLIN SODIUM/WATER 2 G/20 ML
2 SYRINGE (ML) INTRAVENOUS ONCE
Status: COMPLETED | OUTPATIENT
Start: 2021-11-02 | End: 2021-11-02

## 2021-11-02 RX ORDER — LORAZEPAM 1 MG/1
1 TABLET ORAL
Status: DISCONTINUED | OUTPATIENT
Start: 2021-11-02 | End: 2021-11-03 | Stop reason: HOSPADM

## 2021-11-02 RX ORDER — AMOXICILLIN 250 MG
1 CAPSULE ORAL DAILY
Status: DISCONTINUED | OUTPATIENT
Start: 2021-11-03 | End: 2021-11-03 | Stop reason: HOSPADM

## 2021-11-02 RX ADMIN — ACETAMINOPHEN 1000 MG: 500 TABLET ORAL at 06:22

## 2021-11-02 RX ADMIN — SODIUM CHLORIDE, SODIUM LACTATE, POTASSIUM CHLORIDE, AND CALCIUM CHLORIDE: 600; 310; 30; 20 INJECTION, SOLUTION INTRAVENOUS at 10:25

## 2021-11-02 RX ADMIN — PROPOFOL 160 MG: 10 INJECTION, EMULSION INTRAVENOUS at 07:12

## 2021-11-02 RX ADMIN — PHENYLEPHRINE HYDROCHLORIDE 100 MCG: 10 INJECTION INTRAVENOUS at 07:23

## 2021-11-02 RX ADMIN — Medication 10 ML: at 21:06

## 2021-11-02 RX ADMIN — SODIUM CHLORIDE, SODIUM LACTATE, POTASSIUM CHLORIDE, AND CALCIUM CHLORIDE 25 ML/HR: 600; 310; 30; 20 INJECTION, SOLUTION INTRAVENOUS at 05:55

## 2021-11-02 RX ADMIN — FENTANYL CITRATE 25 MCG: 50 INJECTION INTRAMUSCULAR; INTRAVENOUS at 10:16

## 2021-11-02 RX ADMIN — KETAMINE HYDROCHLORIDE 20 MG: 50 INJECTION INTRAMUSCULAR; INTRAVENOUS at 08:15

## 2021-11-02 RX ADMIN — HYDROMORPHONE HYDROCHLORIDE 0.5 MG: 2 INJECTION INTRAMUSCULAR; INTRAVENOUS; SUBCUTANEOUS at 12:44

## 2021-11-02 RX ADMIN — ROCURONIUM BROMIDE 40 MG: 10 INJECTION, SOLUTION INTRAVENOUS at 07:13

## 2021-11-02 RX ADMIN — MIDAZOLAM 2 MG: 1 INJECTION INTRAMUSCULAR; INTRAVENOUS at 07:06

## 2021-11-02 RX ADMIN — LIDOCAINE HYDROCHLORIDE 80 MG: 20 INJECTION, SOLUTION EPIDURAL; INFILTRATION; INTRACAUDAL; PERINEURAL at 07:12

## 2021-11-02 RX ADMIN — Medication 10 MG: at 08:15

## 2021-11-02 RX ADMIN — CEFAZOLIN 2 G: 1 INJECTION, POWDER, FOR SOLUTION INTRAVENOUS at 07:39

## 2021-11-02 RX ADMIN — PHENYLEPHRINE HYDROCHLORIDE 100 MCG: 10 INJECTION INTRAVENOUS at 07:35

## 2021-11-02 RX ADMIN — Medication 10 ML: at 17:07

## 2021-11-02 RX ADMIN — HALOPERIDOL LACTATE 1 MG: 5 INJECTION, SOLUTION INTRAMUSCULAR at 11:06

## 2021-11-02 RX ADMIN — HYDROMORPHONE HYDROCHLORIDE 0.5 MG: 2 INJECTION INTRAMUSCULAR; INTRAVENOUS; SUBCUTANEOUS at 13:15

## 2021-11-02 RX ADMIN — KETAMINE HYDROCHLORIDE 20 MG: 50 INJECTION INTRAMUSCULAR; INTRAVENOUS at 09:15

## 2021-11-02 RX ADMIN — HYDROMORPHONE HYDROCHLORIDE 0.5 MG: 2 INJECTION INTRAMUSCULAR; INTRAVENOUS; SUBCUTANEOUS at 10:55

## 2021-11-02 RX ADMIN — ROCURONIUM BROMIDE 10 MG: 10 INJECTION, SOLUTION INTRAVENOUS at 07:12

## 2021-11-02 RX ADMIN — ONDANSETRON 4 MG: 2 INJECTION INTRAMUSCULAR; INTRAVENOUS at 22:55

## 2021-11-02 RX ADMIN — ROCURONIUM BROMIDE 20 MG: 10 INJECTION, SOLUTION INTRAVENOUS at 08:03

## 2021-11-02 RX ADMIN — PHENYLEPHRINE HYDROCHLORIDE 100 MCG: 10 INJECTION INTRAVENOUS at 10:04

## 2021-11-02 RX ADMIN — FENTANYL CITRATE 100 MCG: 50 INJECTION INTRAMUSCULAR; INTRAVENOUS at 07:12

## 2021-11-02 RX ADMIN — Medication 4 MG: at 10:07

## 2021-11-02 RX ADMIN — Medication 5 MG: at 07:23

## 2021-11-02 RX ADMIN — HYDROCODONE BITARTRATE AND ACETAMINOPHEN 1 TABLET: 5; 325 TABLET ORAL at 21:05

## 2021-11-02 RX ADMIN — GLYCOPYRROLATE 0.6 MG: 0.2 INJECTION, SOLUTION INTRAMUSCULAR; INTRAVENOUS at 10:07

## 2021-11-02 RX ADMIN — OXYCODONE 5 MG: 5 TABLET ORAL at 10:50

## 2021-11-02 RX ADMIN — HYDROMORPHONE HYDROCHLORIDE 0.5 MG: 2 INJECTION INTRAMUSCULAR; INTRAVENOUS; SUBCUTANEOUS at 10:43

## 2021-11-02 RX ADMIN — ONDANSETRON 4 MG: 2 INJECTION INTRAMUSCULAR; INTRAVENOUS at 09:48

## 2021-11-02 RX ADMIN — DEXAMETHASONE SODIUM PHOSPHATE 4 MG: 4 INJECTION, SOLUTION INTRAMUSCULAR; INTRAVENOUS at 07:23

## 2021-11-02 RX ADMIN — ROCURONIUM BROMIDE 20 MG: 10 INJECTION, SOLUTION INTRAVENOUS at 07:41

## 2021-11-02 RX ADMIN — LORAZEPAM 1 MG: 1 TABLET ORAL at 22:06

## 2021-11-02 RX ADMIN — ONDANSETRON 4 MG: 2 INJECTION INTRAMUSCULAR; INTRAVENOUS at 16:51

## 2021-11-02 RX ADMIN — DEXTROSE MONOHYDRATE AND SODIUM CHLORIDE 75 ML/HR: 5; .45 INJECTION, SOLUTION INTRAVENOUS at 16:38

## 2021-11-02 RX ADMIN — KETAMINE HYDROCHLORIDE 40 MG: 50 INJECTION INTRAMUSCULAR; INTRAVENOUS at 07:12

## 2021-11-02 RX ADMIN — ROCURONIUM BROMIDE 20 MG: 10 INJECTION, SOLUTION INTRAVENOUS at 09:01

## 2021-11-02 RX ADMIN — HYDROMORPHONE HYDROCHLORIDE 1 MG: 1 INJECTION, SOLUTION INTRAMUSCULAR; INTRAVENOUS; SUBCUTANEOUS at 16:37

## 2021-11-02 RX ADMIN — Medication 5 MG: at 07:35

## 2021-11-02 NOTE — OP NOTES
PREOPERATIVE DIAGNOSES:  1. Prostate cancer. POSTOPERATIVE DIAGNOSES:  1. Prostate cancer. PROCEDURES:  1. Robotic-assisted laparoscopic radical prostatectomy, left 75% nerve sparing, right 75% nerve sparing. 2. Bilateral pelvic lymph node dissection. 3.  Primary closure of indirect left inguinal hernia. SURGEON: Lynn Andujar M.D.    ASSISTANTS:  1. Oriana Winter      ANESTHESIA: General endotracheal anesthesia. SPECIMENS:  1. Left pelvic lymph nodes. 2. Right pelvic lymph nodes. 3. Periprostatic fat. 4. Prostate and seminal vesicles. IMPLANTS: None    DRAINS:  1. 20-Pakistani Hauser catheter to down drain. 2. 19-Pakistani pelvic Lino drain to bulb suction. ANTIBIOTICS: Ancef    COMPLICATIONS: None. ESTIMATED BLOOD LOSS: 125cc     INDICATIONS: The patient is a 73year old year old male with [place] 3+4 prostate cancer. He was diagnosed on transrectal biopsy for an elevated PSA. He now presents for prostatectomy for treatment of his prostate cancer. Risks and benefits of procedure were discussed in detail with the patient. DESCRIPTION OF PROCEDURE: General endotracheal anesthesia was induced and the patient was placed in a steep Trendelenburg position with legs stirrups. The patient's shoulders, arms, and legs were all carefully padded. His abdomen and genitals were prepped and draped in usual sterile fashion. An 18-Pakistani Hauser catheter was inserted into the bladder sterilely. A Veress needle was inserted into the umbilicus easily and after passing a saline drop test, the abdomen was insufflated to 15mmHg. An 8-mm supraumbilical port was placed. The camera was inserted and noted no intra-abdominal injuries. An 8-mm robotic arm port was placed in the left lateral abdomen and a 12-mm assistant port was placed in the left medial abdomen. Two 8-mm robotic ports were placed in the right abdomen and the robot was docked. Adhesions between the bowel and peritoneum were incised.  The pouch of Courtney Rossi was identified and the anterior peritoneum was incised along the posterior surface of the bladder, identifying the seminal vesicles and vas deferens. We then incised the Denonvilliers fascia and performed a posterior dissection toward the apex of the prostate, mobilizing the rectum off of the posterior surface of the prostate. The vasa deferentia were mobilized and divided using cautery. The seminal vesicle vessels were cauterized. The seminal vesicles were then mobilized off of the posterior surface of the bladder and the peritoneum. He had a relatively large indirect inguinal hernia on the left side that had small bowel in it that was easily reduced. I then excised the hernia sac and closed the defect primarily with #1 vicryl. We did not use mesh or permanent suture b/c bladder will be opened and there is a risk of infection. Should hernia recur, he can at a later date see general surgery for mesh repair. We then performed a left pelvic lymph node dissection. The peritoneum overlying the external iliac vessels was incised lateral to the medial umbilical ligament vertically. We identified the external iliac vein, the obturator nerve and vessels and the internal iliac vein. The lymph node packet was identified and dissected. A Hem-o-malaika clip was placed distal to the node of [place] and the packet was excised. The boundary of pelvic lymph node dissection included the levator ani muscles posteriorly, external iliac vein anteriorly, internal iliac vein superiorly, node of [place] inferiorly, pelvic sidewall laterally and bladder medially. The lymph node packet was placed in an EndoCatch bag and removed from the patient. The contralateral pelvic lymph node dissection was performed with identical margins. The median and medial umbilical ligaments were identified and transected superior to the bladder and the space of Retzius was developed.  We then cauterized the superficial venous complex and dissected prostatic fat off of the prostate and bladder neck and removed it as a specimen. We cleared off the endopelvic fascia bilaterally and opened them sharply. The prostate was then freed from the levator ani muscles and the pubourethralis muscle. We incised the puboprostatic ligaments bilaterally. The deep venous complex was then tied with a 0 V-lock suture. We incised the bladder neck circumferentially, taking care to avoid the prostate and the ureteral orifices. The prostatic pedicles were taken using sequential Hem-o-malaika clips bilaterally. We then peeled the neurovascular bundle off both sides of the prostate sparing the neurovascular bundles as described above based on his tumor location according to his biopsy and MRI findings. We then transected the dorsal venous complex and urethra, and the prostate was placed into an EndoCatch bag. The urethrovesical anastomosis was performed using a 3-0 double-arm V-Loc suture in a running fashion. A new 20-Saudi Arabian Hauser catheter was inserted into the bladder and the bladder was irrigated with 120 mL of normal saline, confirming no evidence of urine leak. Powdered surgicel  was placed in the pelvis to help ensure good hemostasis. A round Wonda Juan Carlos drain was placed through the left lateral port and down to the pelvic region near the anastomosis and was secured to the skin using a 2-0 silk suture. The robot was undocked and all ports were removed. A 4-cm Pfannenstiel incision was made and the rectus fascia was opened transversely. We split the rectus muscle in the midline and the prostate was removed from this incision. The rectus fascia was closed using a #1 PDS suture. The 12 mm port site fascia was closed with 0 vicryl. All skin incisions were approximated using 4-0 Monocryl subcuticular sutures. Wounds were dressed with Mastisol, Steri-Strips, Telfa and Tegaderm and the Hauser catheter was placed to down drain and secured to the leg.  The Wonda Juan Carlos drain was placed to bulb suction. The patient was awoken from general anesthesia and transferred to the PACU in stable condition. Attending physician, Dr. Macy Tomas was present for all critical portions of the case. DISPOSITION:  1. The patient will be admitted to the floor for routine postoperative care  2. Will plan for discharge tomorrow morning and will go home with michael catheter for 10-14 days and subsequent 2 week post-op follow-up    I was present for the key and critical portions of the operative service and personally supervised or performed those portions.     Ric Patrick MD MPH [id number]

## 2021-11-02 NOTE — ANESTHESIA PREPROCEDURE EVALUATION
Relevant Problems   No relevant active problems       Anesthetic History               Review of Systems / Medical History  Patient summary reviewed and pertinent labs reviewed    Pulmonary    COPD: mild      Smoker (quit 10 years ago)         Neuro/Psych   Within defined limits           Cardiovascular    Hypertension: well controlled              Exercise tolerance: >4 METS  Comments: Denies CP, SOB or changes in functional status   GI/Hepatic/Renal     GERD: well controlled           Endo/Other        Arthritis     Other Findings   Comments: Heavy alcohol use           Physical Exam    Airway  Mallampati: II  TM Distance: 4 - 6 cm  Neck ROM: normal range of motion   Mouth opening: Diminished (comment)     Cardiovascular    Rhythm: regular  Rate: normal         Dental    Dentition: Caps/crowns     Pulmonary  Breath sounds clear to auscultation               Abdominal  GI exam deferred       Other Findings            Anesthetic Plan    ASA: 3  Anesthesia type: general          Induction: Intravenous  Anesthetic plan and risks discussed with: Patient    Friend  Plan for Ketamine as pain adjunct. I discussed abdominal nerve blocks if necessary.

## 2021-11-02 NOTE — H&P
[facility]  [address]  [place], [address]  [phone]      No changes to the below H&P. Heart and lungs clear today. Pt consented for RALP with BPLND. All questions answered and risks discussed again.     -Ancef  -RALP with BPLND      Mr. Tasha Allred is a 73 y.o. male with a diagnosis of prostate cancer. INTERVAL HISTORY:  From my prior note:    He is here today to follow-up from his recent prostate biopsy. He underwent biopsy on 7/22/2021. He had a total of 5 cores positive. 2 were [place] 3+3 equal 6 and 3 for [place] 3+4 equal 7. He had to the cores with 70% involvement and 90% involvement and there was perineural invasion.     Of note most of his cores were positive on the left midportion of the gland where his region of interest was on the MRI.     His PSA was 5.8 his digital rectal exam was unremarkable. His MRI showed a PI-RADS 4 lesion on the left mid gland.     His IPSS today is 20 his ralf is 19. Of note he takes Flomax and finasteride. Importantly about a month ago he had an episode of acute urinary retention requiring him to go to the emergency room and have a Hauser catheter placed temporarily.     He has a brother who was diagnosed with prostate cancer and was treated with low-dose brachytherapy successfully.     He has no prior surgical history. He denies any changes today. He is felt fine. He is starting a weight loss program.    He did meet with radiation oncology who did not think brachytherapy was a good idea considering his urinary retention issues. They did recommend external beam but the patient is decided he does not want to go that route. He returns today to schedule a robotic radical prostatectomy and further discuss that. He has a friend with him today who is a retired pharmacist.  They both have a number of questions prepared for me.     Past medical, family and social histories, as well as medications and allergies, were reviewed and updated in the medical record as appropriate. PMH:     Past Medical History:   Diagnosis Date    Arthritis     OA- knees     Cancer ([facility] [address]     prostate cancer    Chronic pain     back, hip and knee -  takes Tramadol    Emphysema lung ([facility] [address] 2021    \"very mild per scan\" no inhalers    Former smoker, stopped smoking in distant past     Quit 2011    GERD (gastroesophageal reflux disease)     omeprazole & pepcid daily - well controlled    Hypertension     losartan BP controlled     Sleep disorder     ativan QHS       MEDs:     cyclobenzaprine  famotidine  fentaNYL citrate (PF)  finasteride  fluticasone propionate  lactated Ringers  lidocaine  LORazepam  losartan  midazolam  naloxone  omeprazole  ondansetron  OTHER  tamsulosin  traMADoL  ZINC PO     ALLERGIES:    Allergies   Allergen Reactions    Monosodium Glutamate Nausea and Vomiting    Other Medication Hives     Red ants    Venom-Yellow Jacket Hives     States no longer has reaction       ROS:     Review of Systems   Constitutional: Negative. Respiratory: Negative. Cardiovascular: Negative. Genitourinary: Negative. Musculoskeletal: Negative. All other systems reviewed and are negative. PHYSICAL EXAMINATION    Visit Vitals  BP (!) 146/98 (BP 1 Location: Right upper arm, BP Patient Position: At rest)   Pulse 71   Temp 98.4 °F (36.9 °C)   Resp 18   Ht 5' 10\" (1.778 m)   Wt 209 lb 12.8 oz (95.2 kg)   SpO2 96%   BMI 30.10 kg/m²     General: well dressed, well nourished, no acute distress  Skin: no rashes  HEENT: Sclera are clear,normocephalic, atraumatic. no external lesions   Cardiovascular: Reg. Normal perfusion  Respiratory: normal respiratory effort, no JVD, no audible wheezing. Musculoskeletal: unremarkable with normal function. No embolic signs or cyanosis.    Neurologic exam: intact, no focal deficits, moves all 4 extremities  Psych: normal mood and affect, alert, oriented x 3  LE:  no edema  GI: soft, nontender, no masses, no CVA tenderness  : DEFERRED       LABORATORY RESULTS:  Results for orders placed or performed in visit on 10/21/21   NOVEL CORONAVIRUS (COVID-19)   Result Value Ref Range    SARS-CoV-2, LEVAR Not Detected Not Detected   INPATIENT   Result Value Ref Range    Inpatient Comment    SARS-COV-2, LEVAR 2 DAY TAT   Result Value Ref Range    SARS-CoV-2, LEVAR 2 DAY TAT Performed        Lab Results   Component Value Date/Time    Prostate Specific Ag 5.8 (H) 05/25/2021 01:20 PM        Lab Results   Component Value Date/Time    Sodium 138 09/09/2021 08:52 AM    Potassium 4.0 09/09/2021 08:52 AM    Chloride 107 09/09/2021 08:52 AM    CO2 24 09/09/2021 08:52 AM    Anion gap 7 09/09/2021 08:52 AM    Glucose 100 09/09/2021 08:52 AM    BUN 20 09/09/2021 08:52 AM    Creatinine 1.00 09/09/2021 08:52 AM    GFR est AA >60 09/09/2021 08:52 AM    GFR est non-AA >60 09/09/2021 08:52 AM    Calcium 8.7 09/09/2021 08:52 AM      IMAGING:  Examination: Prostate MRI 6/12/2021     Comparison: None.     Indication: Elevated PSA     Technique:  Multiplanar, multisequence MR imaging was performed of the prostate  prior to and following gadolinium contrast. 19 mL of Prohance contrast material  was administrated intravenously for the examination. This study was acquired  following the IV administration of contrast material, given the patient's  indications for the examination. If IV contrast material had not been  administered, the likely of detecting abnormalities relevant to the patient's  condition would have been substantially decreased.     Findings:  PROSTATE SIZE: The gland measures 4.6 x 3.9 x 2.7 cm.     PERIPHERAL GLAND: Left lateral mid gland ovoid 9 mm nodule with corresponding  decreased ADC signal and early arterial enhancement present series 5 image 14.   Remaining patchy nonspecific T2 signal throughout the peripheral zone.     CENTRAL GLAND: There are T2 hyperintense nodules in the central gland consistent  with hypertrophy.     CAPSULE: The prostatic capsule is maintained without evidence of metastatic  spread.      PERINEURAL: The neurovascular bundles and posterolateral periprostatic fat are  normal without evidence of tumor extension.       SEMINAL VESICLES: The seminal vesicles and ejaculatory ducts are normal.      LYMPH NODES: No pelvic lymph adenopathy. Limited imaging of the lower abdomen  shows no adenopathy or free fluid.     BONES: No aggressive osseous lesion.     BLADDER AND RECTUM: The bladder is unremarkable. Rectum unremarkable.     IMPRESSION  1. Left lateral mid gland 9 mm nodule as outlined above, Pir[place] 4. Capsule  preserved. 2. No lymphadenopathy. ASSESSMENT:    Mr. Karli Douglas is a 73 y.o. male with a diagnosis of prostate cancer, GS 7, PSA 5.8, MRI as above. We again discussed in detail treatment options as well as risk and benefits of each. We discussed extensively the risks of robotic radical prostatectomy as outlined below. I spent additional time discussing the risk of ED and incontinence. After a full discussion with the patient regarding the natural history of localized prostate cancer, a review of his records including his biopsy pathology report and entire clinical picture, we discussed in detail the treatment options for localized prostate cancer. We discussed active surveillance and radical prostatectomy, as well as both forms of radiation therapy (external beam and brachytherapy). The pros and cons as well as side-effects and potential outcomes of the various treatment options were discussed. We spent a substantial amount of time talking about the risks of the surgery as well as the potential complications. There is a < 1% mortality rate associated with the surgery and about a 5% risk of major complications including but not limited to stroke, myocardial infarction, and pulmonary embolus.  We discussed other risks and complications of surgery including but not limited to; infection, bleeding, the possible need for a blood transfusion, damage to adjacent organs (especially vessels, nerves rectum/bowel, and ureters), need for excision of involved or injured organs/structures, fistula formation, cancer recurrence, possibility of benign pathology, possibility of renal or pulmonary or other organ failure. There is approximately a 5% risk of transfusion for this type of procedure. We talked about sexual function (infertility) and erectile dysfunction following the operation. The patient understands that this surgery will most likely impact his erections and urinary function. There is at a 100% chance of worsening of erectile function after surgery. A need for permanent assistance with sexual function is a possibility. With respect to urinary control, about 5%-10% of men end up with bothersome incontinence beyond six months after the surgery to the point of requiring a secondary incontinence procedure. We discussed stress-related incontinence, which is common and is found in at least a 1/4 to 1/3 of men. It is generally minor and associated with increasing abdominal pressure and straining. We informed the patient that some men have improvements in obstructive urinary symptoms after the surgery. Patient understands urinary incontinence and erectile dysfunction are potential side effects of this procedure. We talked about the logistics of surgery; the need for a catheter to be present for approximately 10 days, a one day hospital stay, no driving for two weeks, and no heavy lifting (nothing over 10lbs) for six weeks. We explained the need for postoperative follow-up which would be initially a check of his pathology and a PSA at about six weeks, followed by three-six month interval PSAs.   Adjuvant treatment such as radiation or additional hormonal manipulations could be required if the cancer were to recur, but that would be based on the timing and type of recurrence. After a discussion of all the above, patient is motivated towards treatment with curative intent. He understands the risks and benefits of surgery, the alternative treatment options and wishes to proceed. Accordingly, we are going to schedule him for a robotic radical prostatectomy (RALP with bilateral pelvic lymph node dissection). We plan to have him see our anesthesia team preoperatively. PLAN:   -plan for surgery--RALP next 2-3 weeks  -anesthesia pre op appointment      ________________________________________      I have seen and examined this patient. I have reviewed and edited the note started by the MA and agree with the outlined plan. Part of this note was written by using a voice dictation software. The note has been proof read but may still contain some grammatical/other typographical errors.       Omer Bahena MD,MPH, [facility]

## 2021-11-02 NOTE — PERIOP NOTES
TRANSFER - OUT REPORT:    Verbal report given to Ledy De Souza on Yenifer Jones being transferred to room 616 for routine post - op. Report consisted of patients Situation, Background, Assessment and Recommendations (SBAR). Information from the following report(s) SBAR, OR Summary, Procedure Summary, Intake/Output, MAR and Recent Results was reviewed with the receiving nurse. Lines:   Peripheral IV 11/02/21 Posterior;Right Hand (Active)   Site Assessment Clean, dry, & intact 11/02/21 1100   Phlebitis Assessment 0 11/02/21 1100   Infiltration Assessment 0 11/02/21 1100   Dressing Status Clean, dry, & intact; Occlusive 11/02/21 1100   Dressing Type Tape;Transparent 11/02/21 1100   Hub Color/Line Status Green; Infusing;Patent 11/02/21 1100   Alcohol Cap Used No 11/02/21 1100       Peripheral IV 11/02/21 Left Hand (Active)   Site Assessment Clean, dry, & intact 11/02/21 1100   Phlebitis Assessment 0 11/02/21 1100   Infiltration Assessment 0 11/02/21 1100   Dressing Status Clean, dry, & intact; Occlusive 11/02/21 1100   Dressing Type Tape;Transparent 11/02/21 1100   Hub Color/Line Status Green;Patent 11/02/21 1100   Alcohol Cap Used No 11/02/21 1100        Opportunity for questions and clarification was provided. Patient transported with:   O2 @ 2 liters    VTE prophylaxis orders have been written for Yenifer Jones. Patient and family given floor number and nurses name. Family updated re: pt status after security code verified.

## 2021-11-02 NOTE — ANESTHESIA POSTPROCEDURE EVALUATION
Procedure(s):  PROSTATECTOMY ROBOTIC ASSISTED WITH BILATERAL PELVIC LYMPH NODE DISSECTION/ LEFT INGUINAL HERNIA REPAIR. general    Anesthesia Post Evaluation      Multimodal analgesia: multimodal analgesia used between 6 hours prior to anesthesia start to PACU discharge  Patient location during evaluation: PACU  Patient participation: complete - patient participated  Level of consciousness: awake and alert  Pain score: 0  Pain management: adequate  Airway patency: patent  Anesthetic complications: no  Cardiovascular status: acceptable and hemodynamically stable  Respiratory status: acceptable and spontaneous ventilation  Hydration status: acceptable  Post anesthesia nausea and vomiting:  none  Final Post Anesthesia Temperature Assessment:  Normothermia (36.0-37.5 degrees C)      INITIAL Post-op Vital signs:   Vitals Value Taken Time   /79 11/02/21 1236   Temp 36.7 °C (98 °F) 11/02/21 1036   Pulse 86 11/02/21 1236   Resp 16 11/02/21 1130   SpO2 100 % 11/02/21 1236   Vitals shown include unvalidated device data.

## 2021-11-03 VITALS
OXYGEN SATURATION: 95 % | BODY MASS INDEX: 32.03 KG/M2 | TEMPERATURE: 98.8 F | SYSTOLIC BLOOD PRESSURE: 151 MMHG | DIASTOLIC BLOOD PRESSURE: 92 MMHG | WEIGHT: 223.7 LBS | HEART RATE: 75 BPM | RESPIRATION RATE: 20 BRPM | HEIGHT: 70 IN

## 2021-11-03 LAB
ANION GAP SERPL CALC-SCNC: 4 MMOL/L (ref 7–16)
BUN SERPL-MCNC: 15 MG/DL (ref 8–23)
CALCIUM SERPL-MCNC: 8.7 MG/DL (ref 8.3–10.4)
CHLORIDE SERPL-SCNC: 105 MMOL/L (ref 98–107)
CO2 SERPL-SCNC: 28 MMOL/L (ref 21–32)
CREAT SERPL-MCNC: 0.96 MG/DL (ref 0.8–1.5)
ERYTHROCYTE [DISTWIDTH] IN BLOOD BY AUTOMATED COUNT: 13 % (ref 11.9–14.6)
GLUCOSE SERPL-MCNC: 119 MG/DL (ref 65–100)
HCT VFR BLD AUTO: 39.8 % (ref 41.1–50.3)
HGB BLD-MCNC: 13 G/DL (ref 13.6–17.2)
MCH RBC QN AUTO: 33.3 PG (ref 26.1–32.9)
MCHC RBC AUTO-ENTMCNC: 32.7 G/DL (ref 31.4–35)
MCV RBC AUTO: 102.1 FL (ref 79.6–97.8)
NRBC # BLD: 0 K/UL (ref 0–0.2)
PLATELET # BLD AUTO: 161 K/UL (ref 150–450)
PMV BLD AUTO: 11.6 FL (ref 9.4–12.3)
POTASSIUM SERPL-SCNC: 4.4 MMOL/L (ref 3.5–5.1)
RBC # BLD AUTO: 3.9 M/UL (ref 4.23–5.6)
SODIUM SERPL-SCNC: 137 MMOL/L (ref 136–145)
WBC # BLD AUTO: 9.8 K/UL (ref 4.3–11.1)

## 2021-11-03 PROCEDURE — 85027 COMPLETE CBC AUTOMATED: CPT

## 2021-11-03 PROCEDURE — 99024 POSTOP FOLLOW-UP VISIT: CPT | Performed by: NURSE PRACTITIONER

## 2021-11-03 PROCEDURE — 77030012865 HC BG URIN LEG MDII -A

## 2021-11-03 PROCEDURE — 80048 BASIC METABOLIC PNL TOTAL CA: CPT

## 2021-11-03 PROCEDURE — 36415 COLL VENOUS BLD VENIPUNCTURE: CPT

## 2021-11-03 PROCEDURE — 2709999900 HC NON-CHARGEABLE SUPPLY

## 2021-11-03 PROCEDURE — 74011250637 HC RX REV CODE- 250/637: Performed by: UROLOGY

## 2021-11-03 PROCEDURE — 74011250636 HC RX REV CODE- 250/636: Performed by: UROLOGY

## 2021-11-03 PROCEDURE — 77030040831 HC BAG URINE DRNG MDII -A

## 2021-11-03 PROCEDURE — 74011250637 HC RX REV CODE- 250/637: Performed by: NURSE PRACTITIONER

## 2021-11-03 RX ORDER — HYDROCODONE BITARTRATE AND ACETAMINOPHEN 5; 325 MG/1; MG/1
1 TABLET ORAL
Qty: 20 TABLET | Refills: 0 | Status: SHIPPED | OUTPATIENT
Start: 2021-11-03 | End: 2021-11-03

## 2021-11-03 RX ORDER — OXYBUTYNIN CHLORIDE 10 MG/1
10 TABLET, EXTENDED RELEASE ORAL
Qty: 10 TABLET | Refills: 0 | Status: SHIPPED | OUTPATIENT
Start: 2021-11-03 | End: 2021-11-12

## 2021-11-03 RX ORDER — AMOXICILLIN 250 MG
1 CAPSULE ORAL DAILY
Qty: 30 TABLET | Refills: 0 | Status: SHIPPED | OUTPATIENT
Start: 2021-11-04

## 2021-11-03 RX ORDER — ONDANSETRON 4 MG/1
4 TABLET, ORALLY DISINTEGRATING ORAL
Qty: 30 TABLET | Refills: 0 | Status: SHIPPED | OUTPATIENT
Start: 2021-11-03

## 2021-11-03 RX ORDER — LOSARTAN POTASSIUM 50 MG/1
100 TABLET ORAL DAILY
Status: DISCONTINUED | OUTPATIENT
Start: 2021-11-03 | End: 2021-11-03 | Stop reason: HOSPADM

## 2021-11-03 RX ORDER — CIPROFLOXACIN 500 MG/1
500 TABLET ORAL 2 TIMES DAILY
Qty: 6 TABLET | Refills: 0 | Status: SHIPPED | OUTPATIENT
Start: 2021-11-14 | End: 2021-11-12

## 2021-11-03 RX ORDER — OXYCODONE AND ACETAMINOPHEN 5; 325 MG/1; MG/1
1 TABLET ORAL
Qty: 20 TABLET | Refills: 0 | Status: SHIPPED | OUTPATIENT
Start: 2021-11-03 | End: 2021-11-06

## 2021-11-03 RX ADMIN — PANTOPRAZOLE SODIUM 40 MG: 40 TABLET, DELAYED RELEASE ORAL at 06:31

## 2021-11-03 RX ADMIN — LOSARTAN POTASSIUM 100 MG: 50 TABLET, FILM COATED ORAL at 11:28

## 2021-11-03 RX ADMIN — Medication 10 ML: at 06:29

## 2021-11-03 RX ADMIN — HYDROCODONE BITARTRATE AND ACETAMINOPHEN 1 TABLET: 5; 325 TABLET ORAL at 09:03

## 2021-11-03 RX ADMIN — CYCLOBENZAPRINE HYDROCHLORIDE 5 MG: 5 TABLET, FILM COATED ORAL at 05:12

## 2021-11-03 RX ADMIN — SENNOSIDES AND DOCUSATE SODIUM 1 TABLET: 8.6; 5 TABLET ORAL at 08:57

## 2021-11-03 RX ADMIN — HYDROCODONE BITARTRATE AND ACETAMINOPHEN 1 TABLET: 5; 325 TABLET ORAL at 03:50

## 2021-11-03 RX ADMIN — HYDROMORPHONE HYDROCHLORIDE 1 MG: 1 INJECTION, SOLUTION INTRAMUSCULAR; INTRAVENOUS; SUBCUTANEOUS at 01:43

## 2021-11-03 RX ADMIN — OXYBUTYNIN CHLORIDE 10 MG: 10 TABLET, EXTENDED RELEASE ORAL at 08:57

## 2021-11-03 NOTE — PROGRESS NOTES
Discharge Note:    Discharge instructions given. Education provided. All questions answered and verbally voiced understanding. Medication changes and follow up appointments discussed. AVS reviewed, signed, and placed in chart. Copy provided for pt. Pt aware to call desk when ready to discharge. IVs removed. LIZANDRO drain was removed earlier this morning. Pt ambulated in halls x2 with no assistance. Was taken to  via wheelchair for his ride. Education on leg bag and urinary bag given. 2 urinary bags and 3 leg bags given for him to take home. Education printout/instructions on care given as well. Going home with Hauser.

## 2021-11-03 NOTE — PROGRESS NOTES
Urology Progress Note    Admit Date: 11/2/2021    Subjective:     Patient has no new complaints. Pain better controlled this am.  Not OOB yet.  victoria clears    Objective:     Patient Vitals for the past 8 hrs:   BP Temp Pulse Resp SpO2 Weight   11/03/21 0410 (!) 136/97 98.2 °F (36.8 °C) 69 16 93 % 223 lb 11.2 oz (101.5 kg)   11/03/21 0018 (!) 139/96 98.2 °F (36.8 °C) 86 16 95 %      11/02 1901 - 11/03 0700  In: -   Out: 300 [Urine:300]  11/01 0701 - 11/02 1900  In: 2250 [P.O.:480; I.V.:1770]  Out: 935 [Urine:800; Drains:10]    Physical Exam:     Awake, alert, and oriented  Lungs no JVD. Breathing is  non-labored; no audible wheezing. Heart regular, normal perfusion  Abd soft, nt, nd  UOP clear in michael      Data Review   Recent Results (from the past 24 hour(s))   METABOLIC PANEL, BASIC    Collection Time: 11/03/21  5:57 AM   Result Value Ref Range    Sodium 137 136 - 145 mmol/L    Potassium 4.4 3.5 - 5.1 mmol/L    Chloride 105 98 - 107 mmol/L    CO2 28 21 - 32 mmol/L    Anion gap 4 (L) 7 - 16 mmol/L    Glucose 119 (H) 65 - 100 mg/dL    BUN 15 8 - 23 MG/DL    Creatinine 0.96 0.8 - 1.5 MG/DL    GFR est AA >60 >60 ml/min/1.73m2    GFR est non-AA >60 >60 ml/min/1.73m2    Calcium 8.7 8.3 - 10.4 MG/DL   CBC W/O DIFF    Collection Time: 11/03/21  5:57 AM   Result Value Ref Range    WBC 9.8 4.3 - 11.1 K/uL    RBC 3.90 (L) 4.23 - 5.6 M/uL    HGB 13.0 (L) 13.6 - 17.2 g/dL    HCT 39.8 (L) 41.1 - 50.3 %    .1 (H) 79.6 - 97.8 FL    MCH 33.3 (H) 26.1 - 32.9 PG    MCHC 32.7 31.4 - 35.0 g/dL    RDW 13.0 11.9 - 14.6 %    PLATELET 354 137 - 781 K/uL    MPV 11.6 9.4 - 12.3 FL    ABSOLUTE NRBC 0.00 0.0 - 0.2 K/uL           Assessment:     Active Problems:    Prostate cancer ([facility] [address] (11/2/2021)      POD 1 from ralp. Doing well.    Plan:     -SLIV; reg diet  -D/C laura  -home this pm with michael--pain meds and cipro x 3 days for michael removal imn 2 weeks  -fu with me already scheduled      Pita Wren MD    [facility]  [facility]

## 2021-11-03 NOTE — DISCHARGE SUMMARY
Discharge Summary     Patient: Shirlene Reina MRN: [id number]  SSN: [id number]    YOB: 1955  Age: 73 y.o. Sex: male      Allergies: Monosodium glutamate, Other medication, and Venom-yellow jacket    Admit Date: 11/2/2021    Discharge Date: 11/3/2021     * Admission Diagnoses:  Prostate cancer ([facility] Marilin [place]     * Discharge Diagnoses:   Hospital Problems as of 11/3/2021 Date Reviewed: 7/30/2021          Codes Class Noted - Resolved POA    Prostate cancer ([facility] ICD-10-CM: C61  ICD-9-CM: 185  11/2/2021 - Present Unknown               * Procedures for this admission:   Procedure(s):  PROSTATECTOMY ROBOTIC ASSISTED WITH BILATERAL PELVIC LYMPH NODE DISSECTION/ LEFT INGUINAL HERNIA REPAIR      * Disposition: Home    * Discharged Condition: improved    * Hospital Course:    Mr. Jono Moralez is a 72 yo male with hx of prostate cancer and is s/p RALP on 11/2/21. By POD 1, pt has ambulated consistently. LIZANDRO removed. He has not yet passed flatus and tolerating solid foods. He does have mild abd bloating. He will d/c home on clear liquids and advance diet with flatus. He will continue to ambulate. RN to teach michael care. He will RTO as scheduled. Patient Active Problem List   Diagnosis Code    BPH associated with nocturia N40.1, R35.1    Urinary frequency R35.0    Urinary urgency R39.15    Prostate cancer [facility]) C61         Discharge Medications:   Current Discharge Medication List      START taking these medications    Details   oxybutynin chloride XL (DITROPAN XL) 10 mg CR tablet Take 1 Tablet by mouth daily as needed for PRN Reason (Other) (bladder spasms). Qty: 10 Tablet, Refills: 0  Start date: 11/3/2021      senna-docusate (PERICOLACE) 8.6-50 mg per tablet Take 1 Tablet by mouth daily. Qty: 30 Tablet, Refills: 0  Start date: 11/4/2021      ciprofloxacin HCl (CIPRO) 500 mg tablet Take 1 Tablet by mouth two (2) times a day for 3 days.   Qty: 6 Tablet, Refills: 0  Start date: 11/14/2021, End date: 11/17/2021    Comments: Start day before follow up appt, take day of appt and day after appt      oxyCODONE-acetaminophen (PERCOCET) 5-325 mg per tablet Take 1 Tablet by mouth every four (4) hours as needed for Pain for up to 3 days. Max Daily Amount: 6 Tablets. Qty: 20 Tablet, Refills: 0  Start date: 11/3/2021, End date: 11/6/2021    Associated Diagnoses: S/P prostatectomy      ondansetron (ZOFRAN ODT) 4 mg disintegrating tablet Take 1 Tablet by mouth every eight (8) hours as needed for Nausea or Vomiting. Qty: 30 Tablet, Refills: 0  Start date: 11/3/2021         CONTINUE these medications which have NOT CHANGED    Details   omeprazole (PRILOSEC) 40 mg capsule Take 40 mg by mouth daily. famotidine (PEPCID) 40 mg tablet Take 40 mg by mouth nightly. finasteride (PROSCAR) 5 mg tablet Take 1 Tablet by mouth daily. Qty: 90 Tablet, Refills: 4    Associated Diagnoses: Elevated PSA      LORazepam (ATIVAN) 1 mg tablet Take 1 mg by mouth nightly. tamsulosin (FLOMAX) 0.4 mg capsule Take 1 Cap by mouth daily. Qty: 90 Cap, Refills: 4      ZINC PO Take  by mouth daily. OTHER 6 Capsules daily. Balance of nature. cyclobenzaprine (FLEXERIL) 5 mg tablet Take 5 mg by mouth as needed. traMADoL (ULTRAM) 50 mg tablet Take 50 mg by mouth every eight (8) hours as needed. losartan (COZAAR) 100 mg tablet Take 100 mg by mouth daily. fluticasone propionate (FLONASE) 50 mcg/actuation nasal spray 2 Sprays by Both Nostrils route daily as needed. * Follow-up Care/Patient Instructions: Activity:   No lifting >10 lbs for four weeks  No driving while catheter is still in and while you are on pain medication. Avoid  baths, pools or hot tubs for 1 month; however, you may shower on the second day. Stay hydrated and keep walking once you are home. Continue stool softeners for two weeks. Continue catheter care as instructed by nursing.       Diet: Regular Diet  Wound Care: None needed    Follow-up Information     Follow up With Specialties Details Why Contact Info    Srinivasan Wagner MD Internal Medicine   [address] [facility] [phone]

## 2021-11-03 NOTE — PROGRESS NOTES
Hourly rounds completed throughout shift. Bed low/locked. Call light within reach. All pt needs are met at this time. Will continue to monitor and give bedside report to oncoming nurse. Prn pain meds given throughout shift.

## 2021-11-03 NOTE — PROGRESS NOTES
Pt ambulated in halls in a full [place] and LIZANDRO drain has been pulled w/ no complications.  Tolerated walk well

## 2021-11-08 ENCOUNTER — APPOINTMENT (OUTPATIENT)
Dept: CT IMAGING | Age: 66
DRG: 699 | End: 2021-11-08
Attending: EMERGENCY MEDICINE
Payer: MEDICARE

## 2021-11-08 ENCOUNTER — HOSPITAL ENCOUNTER (INPATIENT)
Age: 66
LOS: 2 days | Discharge: HOME | DRG: 699 | End: 2021-11-12
Attending: EMERGENCY MEDICINE | Admitting: UROLOGY
Payer: MEDICARE

## 2021-11-08 DIAGNOSIS — A41.9 SEPSIS, DUE TO UNSPECIFIED ORGANISM, UNSPECIFIED WHETHER ACUTE ORGAN DYSFUNCTION PRESENT (HCC): Primary | ICD-10-CM

## 2021-11-08 DIAGNOSIS — R10.84 ABDOMINAL PAIN, GENERALIZED: ICD-10-CM

## 2021-11-08 DIAGNOSIS — N39.0 URINARY TRACT INFECTION WITH HEMATURIA, SITE UNSPECIFIED: ICD-10-CM

## 2021-11-08 DIAGNOSIS — R31.9 URINARY TRACT INFECTION WITH HEMATURIA, SITE UNSPECIFIED: ICD-10-CM

## 2021-11-08 DIAGNOSIS — C61 PROSTATE CANCER (HCC): ICD-10-CM

## 2021-11-08 DIAGNOSIS — Z90.79 STATUS POST PROSTATECTOMY: ICD-10-CM

## 2021-11-08 PROBLEM — R50.9 FEVER: Status: ACTIVE | Noted: 2021-11-08

## 2021-11-08 LAB
ALBUMIN SERPL-MCNC: 3.4 G/DL (ref 3.2–4.6)
ALBUMIN/GLOB SERPL: 0.8 {RATIO} (ref 1.2–3.5)
ALP SERPL-CCNC: 85 U/L (ref 50–136)
ALT SERPL-CCNC: 29 U/L (ref 12–65)
ANION GAP SERPL CALC-SCNC: 6 MMOL/L (ref 7–16)
APPEARANCE UR: CLEAR
AST SERPL-CCNC: 22 U/L (ref 15–37)
BACTERIA URNS QL MICRO: 0 /HPF
BASOPHILS # BLD: 0 K/UL (ref 0–0.2)
BASOPHILS NFR BLD: 0 % (ref 0–2)
BILIRUB SERPL-MCNC: 1.2 MG/DL (ref 0.2–1.1)
BILIRUB UR QL: NEGATIVE
BUN SERPL-MCNC: 19 MG/DL (ref 8–23)
CALCIUM SERPL-MCNC: 9 MG/DL (ref 8.3–10.4)
CASTS URNS QL MICRO: ABNORMAL /LPF
CHLORIDE SERPL-SCNC: 104 MMOL/L (ref 98–107)
CO2 SERPL-SCNC: 25 MMOL/L (ref 21–32)
COLOR UR: YELLOW
CREAT SERPL-MCNC: 1.15 MG/DL (ref 0.8–1.5)
DIFFERENTIAL METHOD BLD: ABNORMAL
EOSINOPHIL # BLD: 0 K/UL (ref 0–0.8)
EOSINOPHIL NFR BLD: 0 % (ref 0.5–7.8)
EPI CELLS #/AREA URNS HPF: 0 /HPF
ERYTHROCYTE [DISTWIDTH] IN BLOOD BY AUTOMATED COUNT: 12 % (ref 11.9–14.6)
GLOBULIN SER CALC-MCNC: 4.5 G/DL (ref 2.3–3.5)
GLUCOSE SERPL-MCNC: 110 MG/DL (ref 65–100)
GLUCOSE UR STRIP.AUTO-MCNC: NEGATIVE MG/DL
HCT VFR BLD AUTO: 41.8 % (ref 41.1–50.3)
HGB BLD-MCNC: 13.6 G/DL (ref 13.6–17.2)
HGB UR QL STRIP: ABNORMAL
IMM GRANULOCYTES # BLD AUTO: 0 K/UL (ref 0–0.5)
IMM GRANULOCYTES NFR BLD AUTO: 0 % (ref 0–5)
KETONES UR QL STRIP.AUTO: 40 MG/DL
LACTATE SERPL-SCNC: 1.1 MMOL/L (ref 0.4–2)
LACTATE SERPL-SCNC: 1.3 MMOL/L (ref 0.4–2)
LEUKOCYTE ESTERASE UR QL STRIP.AUTO: ABNORMAL
LIPASE SERPL-CCNC: 92 U/L (ref 73–393)
LYMPHOCYTES # BLD: 0.8 K/UL (ref 0.5–4.6)
LYMPHOCYTES NFR BLD: 6 % (ref 13–44)
MCH RBC QN AUTO: 33.3 PG (ref 26.1–32.9)
MCHC RBC AUTO-ENTMCNC: 32.5 G/DL (ref 31.4–35)
MCV RBC AUTO: 102.2 FL (ref 79.6–97.8)
MONOCYTES # BLD: 1 K/UL (ref 0.1–1.3)
MONOCYTES NFR BLD: 7 % (ref 4–12)
NEUTS SEG # BLD: 11.8 K/UL (ref 1.7–8.2)
NEUTS SEG NFR BLD: 86 % (ref 43–78)
NITRITE UR QL STRIP.AUTO: POSITIVE
NRBC # BLD: 0 K/UL (ref 0–0.2)
PH UR STRIP: 5.5 [PH] (ref 5–9)
PLATELET # BLD AUTO: 204 K/UL (ref 150–450)
PMV BLD AUTO: 11.1 FL (ref 9.4–12.3)
POTASSIUM SERPL-SCNC: 4.3 MMOL/L (ref 3.5–5.1)
PROT SERPL-MCNC: 7.9 G/DL (ref 6.3–8.2)
PROT UR STRIP-MCNC: 30 MG/DL
RBC # BLD AUTO: 4.09 M/UL (ref 4.23–5.6)
RBC #/AREA URNS HPF: ABNORMAL /HPF
SODIUM SERPL-SCNC: 135 MMOL/L (ref 138–145)
SP GR UR REFRACTOMETRY: 1.08 (ref 1–1.02)
UROBILINOGEN UR QL STRIP.AUTO: 1 EU/DL (ref 0.2–1)
WBC # BLD AUTO: 13.7 K/UL (ref 4.3–11.1)
WBC URNS QL MICRO: ABNORMAL /HPF

## 2021-11-08 PROCEDURE — 74011000258 HC RX REV CODE- 258: Performed by: EMERGENCY MEDICINE

## 2021-11-08 PROCEDURE — 74011000636 HC RX REV CODE- 636: Performed by: EMERGENCY MEDICINE

## 2021-11-08 PROCEDURE — 96374 THER/PROPH/DIAG INJ IV PUSH: CPT

## 2021-11-08 PROCEDURE — 74011250636 HC RX REV CODE- 250/636: Performed by: EMERGENCY MEDICINE

## 2021-11-08 PROCEDURE — 85025 COMPLETE CBC W/AUTO DIFF WBC: CPT

## 2021-11-08 PROCEDURE — 99214 OFFICE O/P EST MOD 30 MIN: CPT | Performed by: UROLOGY

## 2021-11-08 PROCEDURE — 87186 SC STD MICRODIL/AGAR DIL: CPT

## 2021-11-08 PROCEDURE — 83690 ASSAY OF LIPASE: CPT

## 2021-11-08 PROCEDURE — 87088 URINE BACTERIA CULTURE: CPT

## 2021-11-08 PROCEDURE — 83605 ASSAY OF LACTIC ACID: CPT

## 2021-11-08 PROCEDURE — 87086 URINE CULTURE/COLONY COUNT: CPT

## 2021-11-08 PROCEDURE — 80053 COMPREHEN METABOLIC PANEL: CPT

## 2021-11-08 PROCEDURE — G0378 HOSPITAL OBSERVATION PER HR: HCPCS

## 2021-11-08 PROCEDURE — 87040 BLOOD CULTURE FOR BACTERIA: CPT

## 2021-11-08 PROCEDURE — 81001 URINALYSIS AUTO W/SCOPE: CPT

## 2021-11-08 PROCEDURE — 74177 CT ABD & PELVIS W/CONTRAST: CPT

## 2021-11-08 PROCEDURE — 99283 EMERGENCY DEPT VISIT LOW MDM: CPT

## 2021-11-08 PROCEDURE — 96375 TX/PRO/DX INJ NEW DRUG ADDON: CPT

## 2021-11-08 PROCEDURE — 96376 TX/PRO/DX INJ SAME DRUG ADON: CPT

## 2021-11-08 RX ORDER — HYDROMORPHONE HYDROCHLORIDE 1 MG/ML
1 INJECTION, SOLUTION INTRAMUSCULAR; INTRAVENOUS; SUBCUTANEOUS
Status: COMPLETED | OUTPATIENT
Start: 2021-11-08 | End: 2021-11-08

## 2021-11-08 RX ORDER — PANTOPRAZOLE SODIUM 40 MG/1
40 TABLET, DELAYED RELEASE ORAL
Status: DISCONTINUED | OUTPATIENT
Start: 2021-11-09 | End: 2021-11-12 | Stop reason: HOSPADM

## 2021-11-08 RX ORDER — FAMOTIDINE 20 MG/1
40 TABLET, FILM COATED ORAL
Status: DISCONTINUED | OUTPATIENT
Start: 2021-11-09 | End: 2021-11-12 | Stop reason: HOSPADM

## 2021-11-08 RX ORDER — ONDANSETRON 2 MG/ML
4 INJECTION INTRAMUSCULAR; INTRAVENOUS
Status: COMPLETED | OUTPATIENT
Start: 2021-11-08 | End: 2021-11-08

## 2021-11-08 RX ORDER — CYCLOBENZAPRINE HCL 10 MG
5 TABLET ORAL
Status: DISCONTINUED | OUTPATIENT
Start: 2021-11-08 | End: 2021-11-12 | Stop reason: HOSPADM

## 2021-11-08 RX ORDER — ACETAMINOPHEN 325 MG/1
650 TABLET ORAL
Status: DISCONTINUED | OUTPATIENT
Start: 2021-11-08 | End: 2021-11-12 | Stop reason: HOSPADM

## 2021-11-08 RX ORDER — SODIUM CHLORIDE 0.9 % (FLUSH) 0.9 %
5-10 SYRINGE (ML) INJECTION EVERY 8 HOURS
Status: DISCONTINUED | OUTPATIENT
Start: 2021-11-08 | End: 2021-11-08

## 2021-11-08 RX ORDER — SODIUM CHLORIDE 9 MG/ML
500 INJECTION, SOLUTION INTRAVENOUS ONCE
Status: COMPLETED | OUTPATIENT
Start: 2021-11-08 | End: 2021-11-08

## 2021-11-08 RX ORDER — DOCUSATE SODIUM 100 MG/1
100 CAPSULE, LIQUID FILLED ORAL DAILY
Status: DISCONTINUED | OUTPATIENT
Start: 2021-11-09 | End: 2021-11-12 | Stop reason: HOSPADM

## 2021-11-08 RX ORDER — SODIUM CHLORIDE 0.9 % (FLUSH) 0.9 %
5-10 SYRINGE (ML) INJECTION AS NEEDED
Status: DISCONTINUED | OUTPATIENT
Start: 2021-11-08 | End: 2021-11-08

## 2021-11-08 RX ORDER — SODIUM CHLORIDE, SODIUM LACTATE, POTASSIUM CHLORIDE, CALCIUM CHLORIDE 600; 310; 30; 20 MG/100ML; MG/100ML; MG/100ML; MG/100ML
250 INJECTION, SOLUTION INTRAVENOUS CONTINUOUS
Status: DISCONTINUED | OUTPATIENT
Start: 2021-11-08 | End: 2021-11-08

## 2021-11-08 RX ORDER — FLUTICASONE PROPIONATE 50 MCG
2 SPRAY, SUSPENSION (ML) NASAL
Status: DISCONTINUED | OUTPATIENT
Start: 2021-11-08 | End: 2021-11-12 | Stop reason: HOSPADM

## 2021-11-08 RX ORDER — LOSARTAN POTASSIUM 50 MG/1
100 TABLET ORAL DAILY
Status: DISCONTINUED | OUTPATIENT
Start: 2021-11-09 | End: 2021-11-12 | Stop reason: HOSPADM

## 2021-11-08 RX ORDER — ADHESIVE BANDAGE
30 BANDAGE TOPICAL DAILY PRN
Status: DISCONTINUED | OUTPATIENT
Start: 2021-11-08 | End: 2021-11-12 | Stop reason: HOSPADM

## 2021-11-08 RX ORDER — LORAZEPAM 1 MG/1
1 TABLET ORAL
Status: DISCONTINUED | OUTPATIENT
Start: 2021-11-09 | End: 2021-11-12 | Stop reason: HOSPADM

## 2021-11-08 RX ORDER — ONDANSETRON 2 MG/ML
4 INJECTION INTRAMUSCULAR; INTRAVENOUS
Status: DISCONTINUED | OUTPATIENT
Start: 2021-11-08 | End: 2021-11-12 | Stop reason: HOSPADM

## 2021-11-08 RX ORDER — MORPHINE SULFATE 2 MG/ML
2 INJECTION, SOLUTION INTRAMUSCULAR; INTRAVENOUS
Status: DISCONTINUED | OUTPATIENT
Start: 2021-11-08 | End: 2021-11-09

## 2021-11-08 RX ORDER — HYDROCODONE BITARTRATE AND ACETAMINOPHEN 5; 325 MG/1; MG/1
1 TABLET ORAL
Status: DISCONTINUED | OUTPATIENT
Start: 2021-11-08 | End: 2021-11-12 | Stop reason: HOSPADM

## 2021-11-08 RX ORDER — DEXTROSE MONOHYDRATE AND SODIUM CHLORIDE 5; .45 G/100ML; G/100ML
75 INJECTION, SOLUTION INTRAVENOUS CONTINUOUS
Status: DISCONTINUED | OUTPATIENT
Start: 2021-11-09 | End: 2021-11-12

## 2021-11-08 RX ORDER — SODIUM CHLORIDE 0.9 % (FLUSH) 0.9 %
10 SYRINGE (ML) INJECTION
Status: COMPLETED | OUTPATIENT
Start: 2021-11-08 | End: 2021-11-08

## 2021-11-08 RX ORDER — MORPHINE SULFATE 10 MG/ML
5 INJECTION, SOLUTION INTRAMUSCULAR; INTRAVENOUS
Status: COMPLETED | OUTPATIENT
Start: 2021-11-08 | End: 2021-11-08

## 2021-11-08 RX ADMIN — IOPAMIDOL 100 ML: 755 INJECTION, SOLUTION INTRAVENOUS at 16:21

## 2021-11-08 RX ADMIN — SODIUM CHLORIDE, SODIUM LACTATE, POTASSIUM CHLORIDE, AND CALCIUM CHLORIDE 250 ML/HR: 600; 310; 30; 20 INJECTION, SOLUTION INTRAVENOUS at 18:31

## 2021-11-08 RX ADMIN — SODIUM CHLORIDE 100 ML: 900 INJECTION, SOLUTION INTRAVENOUS at 16:22

## 2021-11-08 RX ADMIN — SODIUM CHLORIDE 500 ML: 900 INJECTION, SOLUTION INTRAVENOUS at 16:12

## 2021-11-08 RX ADMIN — MORPHINE SULFATE 5 MG: 10 INJECTION INTRAVENOUS at 16:07

## 2021-11-08 RX ADMIN — CEFEPIME HYDROCHLORIDE 1 G: 1 INJECTION, POWDER, FOR SOLUTION INTRAMUSCULAR; INTRAVENOUS at 16:08

## 2021-11-08 RX ADMIN — ONDANSETRON 4 MG: 2 INJECTION INTRAMUSCULAR; INTRAVENOUS at 16:08

## 2021-11-08 RX ADMIN — ONDANSETRON 4 MG: 2 INJECTION INTRAMUSCULAR; INTRAVENOUS at 18:28

## 2021-11-08 RX ADMIN — HYDROMORPHONE HYDROCHLORIDE 1 MG: 1 INJECTION, SOLUTION INTRAMUSCULAR; INTRAVENOUS; SUBCUTANEOUS at 18:28

## 2021-11-08 RX ADMIN — Medication 10 ML: at 16:22

## 2021-11-08 NOTE — H&P
[facility]  Tom, [address] [facility]  [phone]    [facility]  : 1955     HPI   73 y.o., male returns to ED with complaint of fever, chills and foul smelling urine over past 24h. Reports only small bm since surgery. CT shows bilateral pelvic fluid collections with small amt of air present. S/P RALP, bilateral PLND and L IHR by Dr. Omalley Later on 21. Past Medical History:   Diagnosis Date    Arthritis     OA- knees     Cancer (HCC)     prostate cancer    Chronic pain     back, hip and knee -  takes Tramadol    Emphysema lung ([address]     \"very mild per scan\" no inhalers    Former smoker, stopped smoking in distant past     Quit     GERD (gastroesophageal reflux disease)     omeprazole & pepcid daily - well controlled    Hypertension     losartan BP controlled     Sleep disorder     ativan QHS     Past Surgical History:   Procedure Laterality Date    HX COLONOSCOPY  ~    HX ENDOSCOPY      HX UROLOGICAL      prostate biopsy     HX WISDOM TEETH EXTRACTION       Current Facility-Administered Medications   Medication Dose Route Frequency Provider Last Rate Last Admin    sodium chloride (NS) flush 5-10 mL  5-10 mL IntraVENous Q8H Ke Stark MD        sodium chloride (NS) flush 5-10 mL  5-10 mL IntraVENous PRN Ke Stark MD        lactated Ringers infusion  250 mL/hr IntraVENous CONTINUOUS Ke Stark MD        HYDROmorphone (DILAUDID) injection 1 mg  1 mg IntraVENous NOW Ke Stark MD        ondansetron [facility]) injection 4 mg  4 mg IntraVENous NOW Ke Stark MD         Current Outpatient Medications   Medication Sig Dispense Refill    oxybutynin chloride XL (DITROPAN XL) 10 mg CR tablet Take 1 Tablet by mouth daily as needed for PRN Reason (Other) (bladder spasms). 10 Tablet 0    senna-docusate (PERICOLACE) 8.6-50 mg per tablet Take 1 Tablet by mouth daily.  30 Tablet 0    [START ON 2021] ciprofloxacin HCl (CIPRO) 500 mg tablet Take 1 Tablet by mouth two (2) times a day for 3 days. 6 Tablet 0    ondansetron (ZOFRAN ODT) 4 mg disintegrating tablet Take 1 Tablet by mouth every eight (8) hours as needed for Nausea or Vomiting. 30 Tablet 0    ZINC PO Take  by mouth daily.  OTHER 6 Capsules daily. Balance of nature.  omeprazole (PRILOSEC) 40 mg capsule Take 40 mg by mouth daily.  famotidine (PEPCID) 40 mg tablet Take 40 mg by mouth nightly.  cyclobenzaprine (FLEXERIL) 5 mg tablet Take 5 mg by mouth as needed.  traMADoL (ULTRAM) 50 mg tablet Take 50 mg by mouth every eight (8) hours as needed.  losartan (COZAAR) 100 mg tablet Take 100 mg by mouth daily.  fluticasone propionate (FLONASE) 50 mcg/actuation nasal spray 2 Sprays by Both Nostrils route daily as needed.  finasteride (PROSCAR) 5 mg tablet Take 1 Tablet by mouth daily. (Patient taking differently: Take 5 mg by mouth Every morning.) 90 Tablet 4    LORazepam (ATIVAN) 1 mg tablet Take 1 mg by mouth nightly.  tamsulosin (FLOMAX) 0.4 mg capsule Take 1 Cap by mouth daily. 90 Cap 4     Allergies   Allergen Reactions    Monosodium Glutamate Nausea and Vomiting    Other Medication Hives     Red ants    Venom-Yellow Jacket Hives     States no longer has reaction     Social History     Socioeconomic History    Marital status: SINGLE     Spouse name: Not on file    Number of children: Not on file    Years of education: Not on file    Highest education level: Not on file   Occupational History    Not on file   Tobacco Use    Smoking status: Former Smoker     Packs/day: 1.00     Years: 30.00     Pack years: 30.00     Quit date: 2011     Years since quitting: 10.8    Smokeless tobacco: Never Used   Vaping Use    Vaping Use: Former   Substance and Sexual Activity    Alcohol use:  Yes     Alcohol/week: 36.0 standard drinks     Types: 36 Cans of beer per week     Comment: 2-6 beers daily- 14-36 weekly    Drug use: No    Sexual activity: Not on file   Other Topics Concern     Service Not Asked    Blood Transfusions Not Asked    Caffeine Concern Not Asked    Occupational Exposure Not Asked    Hobby Hazards Not Asked    Sleep Concern Not Asked    Stress Concern Not Asked    Weight Concern Not Asked    Special Diet Not Asked    Back Care Not Asked    Exercise Not Asked    Bike Helmet Not Asked   [address] Not Asked    Self-Exams Not Asked   Social History Narrative    Not on file     Social Determinants of Health     Financial Resource Strain:     Difficulty of Paying Living Expenses: Not on file   Food Insecurity:     Worried About Running Out of Food in the Last Year: Not on file    Charlie of Food in the Last Year: Not on file   Transportation Needs:     Lack of Transportation (Medical): Not on file    Lack of Transportation (Non-Medical): Not on file   Physical Activity:     Days of Exercise per Week: Not on file    Minutes of Exercise per Session: Not on file   Stress:     Feeling of Stress : Not on file   Social Connections:     Frequency of Communication with Friends and Family: Not on file    Frequency of Social Gatherings with Friends and Family: Not on file    Attends Orthodox Services: Not on file    Active Member of [address] [place] [address] [employer] or Organizations: Not on file    Attends Club or Organization Meetings: Not on file    Marital Status: Not on file   Intimate Partner Violence:     Fear of Current or Ex-Partner: Not on file    Emotionally Abused: Not on file    Physically Abused: Not on file    Sexually Abused: Not on file   Housing Stability:     Unable to Pay for Housing in the Last Year: Not on file    Number of [place] in the Last Year: Not on file    Unstable Housing in the Last Year: Not on file     No family history on file. Review of Systems  All systems reviewed and are negative at this time.     Physical Exam  Visit Vitals  /87 (BP 1 Location: Right upper arm, BP Patient Position: Sitting)   Pulse (!) 114   Temp (!) 100.6 °F (38.1 °C)   Resp 20   Ht 5' 10\" (1.778 m)   Wt 204 lb (92.5 kg)   SpO2 97%   BMI 29.27 kg/m²     General appearance - alert, ill appearing  Mental status - alert and oriented  Eyes - extraocular eye movements intact, sclera anicteric  Nose - normal and patent, no erythema or discharge  Mouth - mucous membranes moist  Chest - clear to auscultation bilaterally  Heart - normal rate, regular rhythm  Abdomen - Ports healing well. Mild abd distension but soft. Neurological - normal speech, no focal findings or movement disorder noted  Skin - normal coloration and turgor    Labs and CT reviewed. Assessment/Plan  Post op fever and chills. Will admit and start empirically on Rocephin for UTI. Await cultures. Will observe pelvic fluid collections on Rocephin (lymphoceles vs hematomas vs urinomas). Tx constipation.     Sree Ng, DO

## 2021-11-08 NOTE — ED TRIAGE NOTES
Pt arrives via pov c/o dysuria. Presents with a catheter after prostate surgery. Pain to the abd area.  Darker urine and bad odor to the urine this am.

## 2021-11-08 NOTE — ED PROVIDER NOTES
Leaking around the catheter and dysuria and lower abdominal pain with fevers and chills since last night. Status post prostatectomy 11/2/2021. The history is provided by the patient. Urinary Pain   This is a new problem. The current episode started yesterday. The problem occurs every urination. The problem has not changed since onset. The quality of the pain is described as burning. The pain is moderate. There has been a fever of 100 - 100.9 F. Associated symptoms include chills, nausea and abdominal pain. Pertinent negatives include no vomiting, no hematuria and no back pain. Past Medical History:   Diagnosis Date    Arthritis     OA- knees     Cancer (HCC)     prostate cancer    Chronic pain     back, hip and knee -  takes Tramadol    Emphysema lung ([address] 2021    \"very mild per scan\" no inhalers    Former smoker, stopped smoking in distant past     Quit 2011    GERD (gastroesophageal reflux disease)     omeprazole & pepcid daily - well controlled    Hypertension     losartan BP controlled     Sleep disorder     ativan QHS       Past Surgical History:   Procedure Laterality Date    HX COLONOSCOPY  ~2007    HX ENDOSCOPY      HX UROLOGICAL      prostate biopsy     HX WISDOM TEETH EXTRACTION           No family history on file. Social History     Socioeconomic History    Marital status: SINGLE     Spouse name: Not on file    Number of children: Not on file    Years of education: Not on file    Highest education level: Not on file   Occupational History    Not on file   Tobacco Use    Smoking status: Former Smoker     Packs/day: 1.00     Years: 30.00     Pack years: 30.00     Quit date: 2011     Years since quitting: 10.8    Smokeless tobacco: Never Used   Vaping Use    Vaping Use: Former   Substance and Sexual Activity    Alcohol use:  Yes     Alcohol/week: 36.0 standard drinks     Types: 36 Cans of beer per week     Comment: 2-6 beers daily- 14-36 weekly    Drug use: No    Sexual activity: Not on file   Other Topics Concern     Service Not Asked    Blood Transfusions Not Asked    Caffeine Concern Not Asked    Occupational Exposure Not Asked    Hobby Hazards Not Asked    Sleep Concern Not Asked    Stress Concern Not Asked    Weight Concern Not Asked    Special Diet Not Asked    Back Care Not Asked    Exercise Not Asked    Bike Helmet Not Asked   [address] Not Asked    Self-Exams Not Asked   Social History Narrative    Not on file     Social Determinants of Health     Financial Resource Strain:     Difficulty of Paying Living Expenses: Not on file   Food Insecurity:     Worried About Running Out of Food in the Last Year: Not on file    Charlie of Food in the Last Year: Not on file   Transportation Needs:     Lack of Transportation (Medical): Not on file    Lack of Transportation (Non-Medical): Not on file   Physical Activity:     Days of Exercise per Week: Not on file    Minutes of Exercise per Session: Not on file   Stress:     Feeling of Stress : Not on file   Social Connections:     Frequency of Communication with Friends and Family: Not on file    Frequency of Social Gatherings with Friends and Family: Not on file    Attends Sabianism Services: Not on file    Active Member of [address] [place] [address] Super[employer] or Organizations: Not on file    Attends Club or Organization Meetings: Not on file    Marital Status: Not on file   Intimate Partner Violence:     Fear of Current or Ex-Partner: Not on file    Emotionally Abused: Not on file    Physically Abused: Not on file    Sexually Abused: Not on file   Housing Stability:     Unable to Pay for Housing in the Last Year: Not on file    Number of [place] in the Last Year: Not on file    Unstable Housing in the Last Year: Not on file         ALLERGIES: Monosodium glutamate, Other medication, and Venom-yellow jacket    Review of Systems   Constitutional: Positive for chills and fever. HENT: Positive for rhinorrhea.  Negative for congestion and sore throat. Eyes: Negative for redness. Respiratory: Negative for cough and shortness of breath. Cardiovascular: Negative for chest pain and leg swelling. Gastrointestinal: Positive for abdominal pain and nausea. Negative for diarrhea and vomiting. Endocrine: Negative for polydipsia and polyuria. Genitourinary: Positive for difficulty urinating and dysuria. Negative for hematuria. Musculoskeletal: Negative for back pain and myalgias. Skin: Negative for color change and rash. Neurological: Negative for weakness, numbness and headaches. Psychiatric/Behavioral: Negative for confusion. All other systems reviewed and are negative. Vitals:    11/08/21 1256   BP: 122/87   Pulse: (!) 114   Resp: 20   Temp: (!) 100.6 °F (38.1 °C)   SpO2: 97%   Weight: 92.5 kg (204 lb)   Height: 5' 10\" (1.778 m)            Physical Exam  Vitals and nursing note reviewed. Constitutional:       Appearance: He is well-developed. HENT:      Mouth/Throat:      Mouth: Mucous membranes are moist.      Pharynx: No oropharyngeal exudate. Eyes:      Conjunctiva/sclera: Conjunctivae normal.      Pupils: Pupils are equal, round, and reactive to light. Cardiovascular:      Rate and Rhythm: Normal rate and regular rhythm. Heart sounds: Normal heart sounds. Pulmonary:      Effort: Pulmonary effort is normal.      Breath sounds: Normal breath sounds. Abdominal:      General: Bowel sounds are normal. There is distension. Palpations: Abdomen is soft. Tenderness: There is abdominal tenderness ( Diffuse but mainly lower abdomen and suprapubic area). There is no guarding or rebound. Musculoskeletal:         General: No tenderness. Normal range of motion. Cervical back: Neck supple. Lymphadenopathy:      Cervical: No cervical adenopathy. Skin:     General: Skin is warm and dry. Neurological:      Mental Status: He is alert and oriented to person, place, and time. MDM  Number of Diagnoses or Management Options  Abdominal pain, generalized  Sepsis, due to unspecified organism, unspecified whether acute organ dysfunction present [facility])  Status post prostatectomy  Urinary tract infection with hematuria, site unspecified  Diagnosis management comments: Fluids and antibiotics provided due to patient meeting septic criteria. CT scan ordered to evaluate for complication given distention and tenderness. 5:20 PM  CT shows some concern for seroma versus neuroma versus pelvic sidewall abscesses. Antibiotics and fluids have been provided. Urine pending and I suspect is infected given foul odor. We are awaiting urine from a fresh catheter bag to be obtained.        Amount and/or Complexity of Data Reviewed  Clinical lab tests: ordered and reviewed (Results for orders placed or performed during the hospital encounter of 11/08/21  -CBC WITH AUTOMATED DIFF:        Result                      Value             Ref Range           WBC                         13.7 (H)          4.3 - 11.1 K*       RBC                         4.09 (L)          4.23 - 5.6 M*       HGB                         13.6              13.6 - 17.2 *       HCT                         41.8              41.1 - 50.3 %       MCV                         102.2 (H)         79.6 - 97.8 *       MCH                         33.3 (H)          26.1 - 32.9 *       MCHC                        32.5              31.4 - 35.0 *       RDW                         12.0              11.9 - 14.6 %       PLATELET                    204               150 - 450 K/*       MPV                         11.1              9.4 - 12.3 FL       ABSOLUTE NRBC               0.00              0.0 - 0.2 K/*       DF                          AUTOMATED                             NEUTROPHILS                 86 (H)            43 - 78 %           LYMPHOCYTES                 6 (L)             13 - 44 %           MONOCYTES                   7 4.0 - 12.0 %        EOSINOPHILS                 0 (L)             0.5 - 7.8 %         BASOPHILS                   0                 0.0 - 2.0 %         IMMATURE GRANULOCYTES       0                 0.0 - 5.0 %         ABS. NEUTROPHILS            11.8 (H)          1.7 - 8.2 K/*       ABS. LYMPHOCYTES            0.8               0.5 - 4.6 K/*       ABS. MONOCYTES              1.0               0.1 - 1.3 K/*       ABS. EOSINOPHILS            0.0               0.0 - 0.8 K/*       ABS. BASOPHILS              0.0               0.0 - 0.2 K/*       ABS. IMM. GRANS.            0.0               0.0 - 0.5 K/*  -METABOLIC PANEL, COMPREHENSIVE:        Result                      Value             Ref Range           Sodium                      135 (L)           138 - 145 mm*       Potassium                   4.3               3.5 - 5.1 mm*       Chloride                    104               98 - 107 mmo*       CO2                         25                21 - 32 mmol*       Anion gap                   6 (L)             7 - 16 mmol/L       Glucose                     110 (H)           65 - 100 mg/*       BUN                         19                8 - 23 MG/DL        Creatinine                  1.15              0.8 - 1.5 MG*       GFR est AA                  >60               >60 ml/min/1*       GFR est non-AA              >60               >60 ml/min/1*       Calcium                     9.0               8.3 - 10.4 M*       Bilirubin, total            1.2 (H)           0.2 - 1.1 MG*       ALT (SGPT)                  29                12 - 65 U/L         AST (SGOT)                  22                15 - 37 U/L         Alk.  phosphatase            85                50 - 136 U/L        Protein, total              7.9               6.3 - 8.2 g/*       Albumin                     3.4               3.2 - 4.6 g/*       Globulin                    4.5 (H)           2.3 - 3.5 g/*       A-G Ratio                   0.8 (L) 1.2 - 3.5      -LIPASE:        Result                      Value             Ref Range           Lipase                      92                73 - 393 U/L   -LACTIC ACID:        Result                      Value             Ref Range           Lactic acid                 1.1               0.4 - 2.0 MM*  -LACTIC ACID:        Result                      Value             Ref Range           Lactic acid                 1.3               0.4 - 2.0 MM*  )  Tests in the radiology section of CPT®: ordered and reviewed (CT ABD PELV W CONT    Result Date: 11/8/2021  EXAMINATION: CT  ABDOMEN / PELVIS   11/8/2021 4:32 PM ACCESSION NUMBER:  [id number] INDICATION:  abd pain, fever s/p prostatectomy COMPARISON: MRI pelvis, 6/12/2021 TECHNIQUE: Contiguous axial computed tomographic images were obtained from the domes of the diaphragm to the symphysis pubis after the intravenous administration of 100 mL of Isovue 370. Coronal reconstructions were also performed. Radiation dose reduction techniques were used for this study:  Our CT scanners use one or all of the following: Automated exposure control, adjustment of the mA and/or kVp according to patient's size, iterative reconstruction. FINDINGS: LOWER THORAX: Linear subsegmental atelectasis in the dependent lung bases. LIVER: Normal BILIARY: Gallbladder is unremarkable. No intrahepatic or extrahepatic biliary ductal dilation. SPLEEN: No splenomegaly or concerning lesion. PANCREAS: No pancreatic duct dilation or mass. ADRENALS: No adrenal nodules or hypertrophy. URINARY: Kidneys are symmetric in size and enhancement. Multiple subcentimeter hypodensities on both kidneys are too small to definitively characterize but statistically likely cysts. No hydronephrosis. Punctate nonobstructing left renal calculus. The urinary bladder is decompressed by Hauser catheter. There is mild mass effect on the bladder from the right pelvic sidewall gas and fluid collection.  BOWEL: The stomach, small bowel, and large bowel are normal in caliber. No abnormal wall thickening. No pneumoperitoneum. The appendix is normal. Mild colonic diverticulosis without evidence of acute diverticulitis. Scattered free air is presumably from recent prostatectomy. VESSELS: The abdominal aorta and iliac arterial system are nonaneurysmal with mild atherosclerotic calcifications. NODES: No lymphadenopathy. FLUID: Right pelvic sidewall fluid collection containing a single focus of air measures 6.5 x 3.1 x 5.8 cm with mild mass effect on the urinary bladder. There is a smaller fluid collection along the left pelvic sidewall containing a small focus of gas measuring 3.3 x 2.7 x 5.3 cm. There is a small amount of free fluid in the abdomen and pelvis. REPRODUCTIVE: Recent prostatectomy. Scattered foci of air in the abdominal wall, bilateral inguinal canals and scrotum, and within the peritoneal cavity of the abdomen and pelvis likely related to recent prostatectomy. BONES/SOFT TISSUES: No acute or aggressive osseous abnormality. Regional soft tissues are unremarkable. 1. Postsurgical changes of recent prostatectomy with bilateral pelvic sidewall collections containing minimal gas. Findings could represent postoperative seromas, urinomas, or abscesses in the appropriate setting. Mild mass effect on the urinary bladder. 2. Scattered free air is likely related to recent surgery.     )  Review and summarize past medical records: yes  Discuss the patient with other providers: yes    Risk of Complications, Morbidity, and/or Mortality  Presenting problems: moderate  Diagnostic procedures: low  Management options: moderate  General comments: CRITICAL CARE Documentation: This patient is critically ill and there is a high probability of of imminent or life threatening deterioration in the patient's condition without immediate management.     The nature of the patient's clinical problem is: Sepsis, urinary tract infection, possible abdominal infection status post prostatectomy    I have spent 30 minutes in direct patient care, documentation, review of labs/xrays/old records, discussion with Staff, patient, consultants, review of old records . The time involved in the performance of separately reportable procedures was not counted toward critical care time. Waldemar Martinez MD; 11/8/2021 @5:55 PM      Orders Placed This Encounter      CULTURE, BLOOD      CULTURE, BLOOD      CULTURE, URINE      CT ABD PELV W CONT      CBC with Diff      CMP      Lipase      LACTIC ACID      LACTIC ACID 2 HOUR REPEAT      URINALYSIS W/ RFLX MICROSCOPIC      DIET NPO      NURSING-MISCELLANEOUS: Irrigate catheter please ONE TIME      SALINE LOCK IV ONE TIME Routine      sodium chloride (NS) flush 5-10 mL      sodium chloride (NS) flush 5-10 mL      cefepime (MAXIPIME) 1 g in 0.9% sodium chloride (MBP/ADV) 50 mL MBP      0.9% sodium chloride infusion 500 mL      morphine 10 mg/ml injection 5 mg      ondansetron (ZOFRAN) injection 4 mg      lactated Ringers infusion      sodium chloride 0.9 % bolus infusion 100 mL      iopamidoL (ISOVUE-370) 76 % injection 100 mL      saline peripheral flush soln 10 mL      HYDROmorphone (DILAUDID) injection 1 mg      ondansetron (ZOFRAN) injection 4 mg         (A41.9) Sepsis, due to unspecified organism, unspecified whether acute organ dysfunction present ([facility] [address]  (primary encounter diagnosis)    (N39.0,  R31.9) Urinary tract infection with hematuria, site unspecified    (Z90.79) Status post prostatectomy    (R10.84) Abdominal pain, generalized        Results for orders placed or performed during the hospital encounter of 11/08/21  1.  CBC WITH AUTOMATED DIFF:        Result                      Value             Ref Range           WBC                         13.7 (H)          4.3 - 11.1 K*       RBC                         4.09 (L)          4.23 - 5.6 M*       HGB                         13.6              13.6 - 17.2 *       HCT 41.8              41.1 - 50.3 %       MCV                         102.2 (H)         79.6 - 97.8 *       MCH                         33.3 (H)          26.1 - 32.9 *       MCHC                        32.5              31.4 - 35.0 *       RDW                         12.0              11.9 - 14.6 %       PLATELET                    204               150 - 450 K/*       MPV                         11.1              9.4 - 12.3 FL       ABSOLUTE NRBC               0.00              0.0 - 0.2 K/*       DF                          AUTOMATED                             NEUTROPHILS                 86 (H)            43 - 78 %           LYMPHOCYTES                 6 (L)             13 - 44 %           MONOCYTES                   7                 4.0 - 12.0 %        EOSINOPHILS                 0 (L)             0.5 - 7.8 %         BASOPHILS                   0                 0.0 - 2.0 %         IMMATURE GRANULOCYTES       0                 0.0 - 5.0 %         ABS. NEUTROPHILS            11.8 (H)          1.7 - 8.2 K/*       ABS. LYMPHOCYTES            0.8               0.5 - 4.6 K/*       ABS. MONOCYTES              1.0               0.1 - 1.3 K/*       ABS. EOSINOPHILS            0.0               0.0 - 0.8 K/*       ABS. BASOPHILS              0.0               0.0 - 0.2 K/*       ABS. IMM.  GRANS.            0.0               0.0 - 0.5 K/*  2. METABOLIC PANEL, COMPREHENSIVE:        Result                      Value             Ref Range           Sodium                      135 (L)           138 - 145 mm*       Potassium                   4.3               3.5 - 5.1 mm*       Chloride                    104               98 - 107 mmo*       CO2                         25                21 - 32 mmol*       Anion gap                   6 (L)             7 - 16 mmol/L       Glucose                     110 (H)           65 - 100 mg/*       BUN                         19                8 - 23 MG/DL        Creatinine 1.15              0.8 - 1.5 MG*       GFR est AA                  >60               >60 ml/min/1*       GFR est non-AA              >60               >60 ml/min/1*       Calcium                     9.0               8.3 - 10.4 M*       Bilirubin, total            1.2 (H)           0.2 - 1.1 MG*       ALT (SGPT)                  29                12 - 65 U/L         AST (SGOT)                  22                15 - 37 U/L         Alk. phosphatase            85                50 - 136 U/L        Protein, total              7.9               6.3 - 8.2 g/*       Albumin                     3.4               3.2 - 4.6 g/*       Globulin                    4.5 (H)           2.3 - 3.5 g/*       A-G Ratio                   0.8 (L)           1.2 - 3.5      3. LIPASE:        Result                      Value             Ref Range           Lipase                      92                73 - 393 U/L   4. LACTIC ACID:        Result                      Value             Ref Range           Lactic acid                 1.1               0.4 - 2.0 MM*  5.  LACTIC ACID:        Result                      Value             Ref Range           Lactic acid                 1.3               0.4 - 2.0 MM*      Patient Progress  Patient progress: (Guarded)         Procedures

## 2021-11-09 LAB
ANION GAP SERPL CALC-SCNC: 8 MMOL/L (ref 7–16)
BUN SERPL-MCNC: 13 MG/DL (ref 8–23)
CALCIUM SERPL-MCNC: 8.3 MG/DL (ref 8.3–10.4)
CHLORIDE SERPL-SCNC: 104 MMOL/L (ref 98–107)
CO2 SERPL-SCNC: 22 MMOL/L (ref 21–32)
CREAT SERPL-MCNC: 1.07 MG/DL (ref 0.8–1.5)
ERYTHROCYTE [DISTWIDTH] IN BLOOD BY AUTOMATED COUNT: 12.1 % (ref 11.9–14.6)
GLUCOSE SERPL-MCNC: 138 MG/DL (ref 65–100)
HCT VFR BLD AUTO: 37.9 % (ref 41.1–50.3)
HGB BLD-MCNC: 12.6 G/DL (ref 13.6–17.2)
MCH RBC QN AUTO: 33.5 PG (ref 26.1–32.9)
MCHC RBC AUTO-ENTMCNC: 33.2 G/DL (ref 31.4–35)
MCV RBC AUTO: 100.8 FL (ref 79.6–97.8)
NRBC # BLD: 0 K/UL (ref 0–0.2)
PLATELET # BLD AUTO: 196 K/UL (ref 150–450)
PMV BLD AUTO: 11.6 FL (ref 9.4–12.3)
POTASSIUM SERPL-SCNC: 4 MMOL/L (ref 3.5–5.1)
RBC # BLD AUTO: 3.76 M/UL (ref 4.23–5.6)
SODIUM SERPL-SCNC: 134 MMOL/L (ref 138–145)
WBC # BLD AUTO: 15.4 K/UL (ref 4.3–11.1)

## 2021-11-09 PROCEDURE — 96375 TX/PRO/DX INJ NEW DRUG ADDON: CPT

## 2021-11-09 PROCEDURE — 96376 TX/PRO/DX INJ SAME DRUG ADON: CPT

## 2021-11-09 PROCEDURE — 36415 COLL VENOUS BLD VENIPUNCTURE: CPT

## 2021-11-09 PROCEDURE — 74011250637 HC RX REV CODE- 250/637: Performed by: UROLOGY

## 2021-11-09 PROCEDURE — G0378 HOSPITAL OBSERVATION PER HR: HCPCS

## 2021-11-09 PROCEDURE — 85027 COMPLETE CBC AUTOMATED: CPT

## 2021-11-09 PROCEDURE — 80048 BASIC METABOLIC PNL TOTAL CA: CPT

## 2021-11-09 PROCEDURE — 74011000258 HC RX REV CODE- 258: Performed by: UROLOGY

## 2021-11-09 PROCEDURE — 74011250637 HC RX REV CODE- 250/637: Performed by: NURSE PRACTITIONER

## 2021-11-09 PROCEDURE — 74011250636 HC RX REV CODE- 250/636: Performed by: UROLOGY

## 2021-11-09 PROCEDURE — 74011000250 HC RX REV CODE- 250: Performed by: UROLOGY

## 2021-11-09 RX ORDER — HYDROMORPHONE HYDROCHLORIDE 1 MG/ML
1 INJECTION, SOLUTION INTRAMUSCULAR; INTRAVENOUS; SUBCUTANEOUS
Status: DISCONTINUED | OUTPATIENT
Start: 2021-11-09 | End: 2021-11-10

## 2021-11-09 RX ORDER — POLYETHYLENE GLYCOL 3350 17 G/17G
17 POWDER, FOR SOLUTION ORAL DAILY
Status: DISCONTINUED | OUTPATIENT
Start: 2021-11-09 | End: 2021-11-12 | Stop reason: HOSPADM

## 2021-11-09 RX ADMIN — FAMOTIDINE 40 MG: 20 TABLET, FILM COATED ORAL at 20:48

## 2021-11-09 RX ADMIN — HYDROMORPHONE HYDROCHLORIDE 1 MG: 1 INJECTION, SOLUTION INTRAMUSCULAR; INTRAVENOUS; SUBCUTANEOUS at 22:34

## 2021-11-09 RX ADMIN — CEFTRIAXONE 1 G: 1 INJECTION, POWDER, FOR SOLUTION INTRAMUSCULAR; INTRAVENOUS at 23:33

## 2021-11-09 RX ADMIN — DEXTROSE MONOHYDRATE AND SODIUM CHLORIDE 125 ML/HR: 5; .45 INJECTION, SOLUTION INTRAVENOUS at 02:08

## 2021-11-09 RX ADMIN — FAMOTIDINE 40 MG: 20 TABLET, FILM COATED ORAL at 00:57

## 2021-11-09 RX ADMIN — POLYETHYLENE GLYCOL 3350 17 G: 17 POWDER, FOR SOLUTION ORAL at 10:01

## 2021-11-09 RX ADMIN — LORAZEPAM 1 MG: 1 TABLET ORAL at 20:48

## 2021-11-09 RX ADMIN — PANTOPRAZOLE SODIUM 40 MG: 40 TABLET, DELAYED RELEASE ORAL at 08:13

## 2021-11-09 RX ADMIN — LORAZEPAM 1 MG: 1 TABLET ORAL at 02:07

## 2021-11-09 RX ADMIN — LOSARTAN POTASSIUM 100 MG: 50 TABLET, FILM COATED ORAL at 08:13

## 2021-11-09 RX ADMIN — ONDANSETRON 4 MG: 2 INJECTION INTRAMUSCULAR; INTRAVENOUS at 22:35

## 2021-11-09 RX ADMIN — MAGNESIUM HYDROXIDE 30 ML: 2400 SUSPENSION ORAL at 04:00

## 2021-11-09 RX ADMIN — DOCUSATE SODIUM 100 MG: 100 CAPSULE, LIQUID FILLED ORAL at 08:13

## 2021-11-09 RX ADMIN — DEXTROSE MONOHYDRATE AND SODIUM CHLORIDE 125 ML/HR: 5; .45 INJECTION, SOLUTION INTRAVENOUS at 17:58

## 2021-11-09 RX ADMIN — MORPHINE SULFATE 2 MG: 2 INJECTION, SOLUTION INTRAMUSCULAR; INTRAVENOUS at 04:21

## 2021-11-09 RX ADMIN — CEFTRIAXONE 1 G: 1 INJECTION, POWDER, FOR SOLUTION INTRAMUSCULAR; INTRAVENOUS at 00:59

## 2021-11-09 RX ADMIN — MORPHINE SULFATE 2 MG: 2 INJECTION, SOLUTION INTRAMUSCULAR; INTRAVENOUS at 00:49

## 2021-11-09 RX ADMIN — HYDROMORPHONE HYDROCHLORIDE 1 MG: 1 INJECTION, SOLUTION INTRAMUSCULAR; INTRAVENOUS; SUBCUTANEOUS at 17:58

## 2021-11-09 RX ADMIN — ONDANSETRON 4 MG: 2 INJECTION INTRAMUSCULAR; INTRAVENOUS at 14:37

## 2021-11-09 RX ADMIN — HYDROMORPHONE HYDROCHLORIDE 1 MG: 1 INJECTION, SOLUTION INTRAMUSCULAR; INTRAVENOUS; SUBCUTANEOUS at 11:20

## 2021-11-09 NOTE — PROGRESS NOTES
Urology Progress Note    Admit Date: 11/8/2021    Subjective:     Patient has no new complaints other than constipation; small bm this am.    Admitted with fever and wbc yesterday, s/p RALP 1 week ago. CT shows small bilateral lymphoceles; cultures pending. Objective:     Patient Vitals for the past 8 hrs:   BP Temp Pulse Resp SpO2   11/09/21 0330 103/66 98.9 °F (37.2 °C) 93 14 94 %   11/08/21 2341 (!) 143/87 98 °F (36.7 °C) 98 14 99 %     No intake/output data recorded. No intake/output data recorded. Physical Exam:     Awake, alert, and oriented  Lungs no JVD. Breathing is  non-labored; no audible wheezing. Heart regular, normal perfusion  Abd soft, nt, nd; incision clear  UOP clear      Data Review   Recent Results (from the past 24 hour(s))   CBC WITH AUTOMATED DIFF    Collection Time: 11/08/21  1:00 PM   Result Value Ref Range    WBC 13.7 (H) 4.3 - 11.1 K/uL    RBC 4.09 (L) 4.23 - 5.6 M/uL    HGB 13.6 13.6 - 17.2 g/dL    HCT 41.8 41.1 - 50.3 %    .2 (H) 79.6 - 97.8 FL    MCH 33.3 (H) 26.1 - 32.9 PG    MCHC 32.5 31.4 - 35.0 g/dL    RDW 12.0 11.9 - 14.6 %    PLATELET 288 813 - 569 K/uL    MPV 11.1 9.4 - 12.3 FL    ABSOLUTE NRBC 0.00 0.0 - 0.2 K/uL    DF AUTOMATED      NEUTROPHILS 86 (H) 43 - 78 %    LYMPHOCYTES 6 (L) 13 - 44 %    MONOCYTES 7 4.0 - 12.0 %    EOSINOPHILS 0 (L) 0.5 - 7.8 %    BASOPHILS 0 0.0 - 2.0 %    IMMATURE GRANULOCYTES 0 0.0 - 5.0 %    ABS. NEUTROPHILS 11.8 (H) 1.7 - 8.2 K/UL    ABS. LYMPHOCYTES 0.8 0.5 - 4.6 K/UL    ABS. MONOCYTES 1.0 0.1 - 1.3 K/UL    ABS. EOSINOPHILS 0.0 0.0 - 0.8 K/UL    ABS. BASOPHILS 0.0 0.0 - 0.2 K/UL    ABS. IMM.  GRANS. 0.0 0.0 - 0.5 K/UL   METABOLIC PANEL, COMPREHENSIVE    Collection Time: 11/08/21  1:00 PM   Result Value Ref Range    Sodium 135 (L) 138 - 145 mmol/L    Potassium 4.3 3.5 - 5.1 mmol/L    Chloride 104 98 - 107 mmol/L    CO2 25 21 - 32 mmol/L    Anion gap 6 (L) 7 - 16 mmol/L    Glucose 110 (H) 65 - 100 mg/dL    BUN 19 8 - 23 MG/DL Creatinine 1.15 0.8 - 1.5 MG/DL    GFR est AA >60 >60 ml/min/1.73m2    GFR est non-AA >60 >60 ml/min/1.73m2    Calcium 9.0 8.3 - 10.4 MG/DL    Bilirubin, total 1.2 (H) 0.2 - 1.1 MG/DL    ALT (SGPT) 29 12 - 65 U/L    AST (SGOT) 22 15 - 37 U/L    Alk. phosphatase 85 50 - 136 U/L    Protein, total 7.9 6.3 - 8.2 g/dL    Albumin 3.4 3.2 - 4.6 g/dL    Globulin 4.5 (H) 2.3 - 3.5 g/dL    A-G Ratio 0.8 (L) 1.2 - 3.5     LIPASE    Collection Time: 11/08/21  1:00 PM   Result Value Ref Range    Lipase 92 73 - 393 U/L   LACTIC ACID    Collection Time: 11/08/21  1:00 PM   Result Value Ref Range    Lactic acid 1.1 0.4 - 2.0 MMOL/L   LACTIC ACID    Collection Time: 11/08/21  4:00 PM   Result Value Ref Range    Lactic acid 1.3 0.4 - 2.0 MMOL/L   URINALYSIS W/ RFLX MICROSCOPIC    Collection Time: 11/08/21  6:16 PM   Result Value Ref Range    Color YELLOW      Appearance CLEAR      Specific gravity 1.076 (H) 1.001 - 1.023      pH (UA) 5.5 5.0 - 9.0      Protein 30 (A) NEG mg/dL    Glucose Negative mg/dL    Ketone 40 (A) NEG mg/dL    Bilirubin Negative NEG      Blood LARGE (A) NEG      Urobilinogen 1.0 0.2 - 1.0 EU/dL    Nitrites Positive (A) NEG      Leukocyte Esterase TRACE (A) NEG      WBC  0 /hpf    RBC 20-50 0 /hpf    Epithelial cells 0 0 /hpf    Bacteria 0 0 /hpf    Casts 5-10 0 /lpf   CULTURE, URINE    Collection Time: 11/08/21  6:16 PM    Specimen: Cath Urine    URINE   Result Value Ref Range    Special Requests: NO SPECIAL REQUESTS      Culture result:        NO GROWTH AFTER SHORT PERIOD OF INCUBATION. FURTHER RESULTS TO FOLLOW AFTER OVERNIGHT INCUBATION.    METABOLIC PANEL, BASIC    Collection Time: 11/09/21  5:30 AM   Result Value Ref Range    Sodium 134 (L) 138 - 145 mmol/L    Potassium 4.0 3.5 - 5.1 mmol/L    Chloride 104 98 - 107 mmol/L    CO2 22 21 - 32 mmol/L    Anion gap 8 7 - 16 mmol/L    Glucose 138 (H) 65 - 100 mg/dL    BUN 13 8 - 23 MG/DL    Creatinine 1.07 0.8 - 1.5 MG/DL    GFR est AA >60 >60 ml/min/1.73m2 GFR est non-AA >60 >60 ml/min/1.73m2    Calcium 8.3 8.3 - 10.4 MG/DL   CBC W/O DIFF    Collection Time: 11/09/21  5:30 AM   Result Value Ref Range    WBC 15.4 (H) 4.3 - 11.1 K/uL    RBC 3.76 (L) 4.23 - 5.6 M/uL    HGB 12.6 (L) 13.6 - 17.2 g/dL    HCT 37.9 (L) 41.1 - 50.3 %    .8 (H) 79.6 - 97.8 FL    MCH 33.5 (H) 26.1 - 32.9 PG    MCHC 33.2 31.4 - 35.0 g/dL    RDW 12.1 11.9 - 14.6 %    PLATELET 550 859 - 906 K/uL    MPV 11.6 9.4 - 12.3 FL    ABSOLUTE NRBC 0.00 0.0 - 0.2 K/uL           Assessment:     Active Problems:    Fever (11/8/2021)      Fever, s/p RALP one week ago.    Plan:     -continue rocephin  -f/u cultures  -reg diet  -AM cbc  -bowel regimen      Lorel Lennox, MD    [facility]  [address] [facility]

## 2021-11-09 NOTE — PROGRESS NOTES
Problem: Falls - Risk of  Goal: *Absence of Falls  Description: Document Thiago Beckham Fall Risk and appropriate interventions in the flowsheet.   Outcome: Progressing Towards Goal  Note: Fall Risk Interventions:  Mobility Interventions: Patient to call before getting OOB         Medication Interventions: Patient to call before getting OOB, Teach patient to arise slowly    Elimination Interventions: Call light in reach, Patient to call for help with toileting needs

## 2021-11-09 NOTE — ACP (ADVANCE CARE PLANNING)
Advance Care Planning     General Advance Care Planning (ACP) Conversation      Date of Conversation: 11/8/2021  Conducted with: Patient with Decision Making Capacity    Healthcare Decision Maker:   No healthcare decision makers have been documented. Click here to complete [address] including selection of the Healthcare Decision Maker Relationship (ie \"Primary\")    Today we documented Decision Maker(s) consistent with Legal Next of Kin hierarchy.     Content/Action Overview:   Has NO ACP documents/care preferences - refer to ACP Clinical Specialist  Reviewed DNR/DNI and patient elects Full Code (Attempt Resuscitation)  Topics discussed: hospitalization preferences and resuscitation preferences  Additional Comments: N/A     Length of Voluntary ACP Conversation in minutes:  <16 minutes (Non-Billable)    Barbara Vallecillo LMSW

## 2021-11-09 NOTE — PROGRESS NOTES
Currently complains of lower abd pain that radiates to his back. Dr. Rodrigo Smith notified that he has [place] Mew ordered but stated it causes him to itch. Morphine q2 ordered but his blood pressure is currently 103/66, HR 93. Pain is an 8/10. Dr. Rodrigo Smith stated that it was okay to give and could be given q1.

## 2021-11-09 NOTE — PROGRESS NOTES
Chart screened by CM for d/c planning. Pt is self-employed and has insurance with pharmacy benefits as well as a PCP. Pt presented to the ED with c/o fever, chills, and foul smelling urine. Pt admitted under OBS status with Dx of fever s/p RALP one week ago. Pt is currently receiving IV ABT. Awaiting urine and blood cultures to result. No immediate needs identified. CM will continue to follow and remain available if any needs arise. Care Management Interventions  PCP Verified by CM: Yes Greg Klein MD)  Mode of Transport at Discharge:  Other (see comment) (Friend)  Transition of Care Consult (CM Consult): Discharge Planning  Physical Therapy Consult: No  Occupational Therapy Consult: No  Speech Therapy Consult: No  Support Systems: Other Family Member(s), Friend/Neighbor  Confirm Follow Up Transport: Friends  The Patient and/or Patient Representative was Provided with a Choice of Provider and Agrees with the Discharge Plan?: Yes  Name of the Patient Representative Who was Provided with a Choice of Provider and Agrees with the Discharge Plan: [place]  \"Ray\" Gareth Kyle of Choice List was Provided with Basic Dialogue that Supports the Patient's Individualized Plan of Care/Goals, Treatment Preferences and Shares the Quality Data Associated with the Providers?: Yes  [place] Resource Information Provided?: No  Discharge Location  Discharge Placement: Home

## 2021-11-10 LAB
ERYTHROCYTE [DISTWIDTH] IN BLOOD BY AUTOMATED COUNT: 12 % (ref 11.9–14.6)
HCT VFR BLD AUTO: 33.9 % (ref 41.1–50.3)
HGB BLD-MCNC: 11.2 G/DL (ref 13.6–17.2)
MCH RBC QN AUTO: 33.4 PG (ref 26.1–32.9)
MCHC RBC AUTO-ENTMCNC: 33 G/DL (ref 31.4–35)
MCV RBC AUTO: 101.2 FL (ref 79.6–97.8)
NRBC # BLD: 0 K/UL (ref 0–0.2)
PLATELET # BLD AUTO: 161 K/UL (ref 150–450)
PMV BLD AUTO: 11.4 FL (ref 9.4–12.3)
RBC # BLD AUTO: 3.35 M/UL (ref 4.23–5.6)
WBC # BLD AUTO: 12.1 K/UL (ref 4.3–11.1)

## 2021-11-10 PROCEDURE — 74011250637 HC RX REV CODE- 250/637: Performed by: UROLOGY

## 2021-11-10 PROCEDURE — 85027 COMPLETE CBC AUTOMATED: CPT

## 2021-11-10 PROCEDURE — 74011250637 HC RX REV CODE- 250/637: Performed by: NURSE PRACTITIONER

## 2021-11-10 PROCEDURE — 97530 THERAPEUTIC ACTIVITIES: CPT

## 2021-11-10 PROCEDURE — G0378 HOSPITAL OBSERVATION PER HR: HCPCS

## 2021-11-10 PROCEDURE — 74011250636 HC RX REV CODE- 250/636: Performed by: UROLOGY

## 2021-11-10 PROCEDURE — 65660000000 HC RM CCU STEPDOWN

## 2021-11-10 PROCEDURE — 96376 TX/PRO/DX INJ SAME DRUG ADON: CPT

## 2021-11-10 PROCEDURE — 74011000250 HC RX REV CODE- 250: Performed by: UROLOGY

## 2021-11-10 PROCEDURE — 74011000258 HC RX REV CODE- 258: Performed by: UROLOGY

## 2021-11-10 PROCEDURE — 97161 PT EVAL LOW COMPLEX 20 MIN: CPT

## 2021-11-10 PROCEDURE — 36415 COLL VENOUS BLD VENIPUNCTURE: CPT

## 2021-11-10 RX ORDER — FACIAL-BODY WIPES
10 EACH TOPICAL ONCE
Status: COMPLETED | OUTPATIENT
Start: 2021-11-10 | End: 2021-11-10

## 2021-11-10 RX ORDER — FACIAL-BODY WIPES
10 EACH TOPICAL ONCE
Status: DISPENSED | OUTPATIENT
Start: 2021-11-11 | End: 2021-11-11

## 2021-11-10 RX ADMIN — ONDANSETRON 4 MG: 2 INJECTION INTRAMUSCULAR; INTRAVENOUS at 03:48

## 2021-11-10 RX ADMIN — FAMOTIDINE 40 MG: 20 TABLET, FILM COATED ORAL at 21:12

## 2021-11-10 RX ADMIN — PANTOPRAZOLE SODIUM 40 MG: 40 TABLET, DELAYED RELEASE ORAL at 05:44

## 2021-11-10 RX ADMIN — HYDROCODONE BITARTRATE AND ACETAMINOPHEN 1 TABLET: 5; 325 TABLET ORAL at 22:46

## 2021-11-10 RX ADMIN — ONDANSETRON 4 MG: 2 INJECTION INTRAMUSCULAR; INTRAVENOUS at 09:53

## 2021-11-10 RX ADMIN — LORAZEPAM 1 MG: 1 TABLET ORAL at 21:12

## 2021-11-10 RX ADMIN — POLYETHYLENE GLYCOL 3350 17 G: 17 POWDER, FOR SOLUTION ORAL at 08:14

## 2021-11-10 RX ADMIN — CEFTRIAXONE 1 G: 1 INJECTION, POWDER, FOR SOLUTION INTRAMUSCULAR; INTRAVENOUS at 23:53

## 2021-11-10 RX ADMIN — MAGNESIUM HYDROXIDE 30 ML: 2400 SUSPENSION ORAL at 05:44

## 2021-11-10 RX ADMIN — DEXTROSE MONOHYDRATE AND SODIUM CHLORIDE 125 ML/HR: 5; .45 INJECTION, SOLUTION INTRAVENOUS at 09:55

## 2021-11-10 RX ADMIN — HYDROCODONE BITARTRATE AND ACETAMINOPHEN 1 TABLET: 5; 325 TABLET ORAL at 14:28

## 2021-11-10 RX ADMIN — HYDROCODONE BITARTRATE AND ACETAMINOPHEN 1 TABLET: 5; 325 TABLET ORAL at 18:35

## 2021-11-10 RX ADMIN — DEXTROSE MONOHYDRATE AND SODIUM CHLORIDE 125 ML/HR: 5; .45 INJECTION, SOLUTION INTRAVENOUS at 21:13

## 2021-11-10 RX ADMIN — DOCUSATE SODIUM 100 MG: 100 CAPSULE, LIQUID FILLED ORAL at 08:14

## 2021-11-10 RX ADMIN — SODIUM PHOSPHATE 1 ENEMA: 7; 19 ENEMA RECTAL at 09:53

## 2021-11-10 RX ADMIN — HYDROMORPHONE HYDROCHLORIDE 1 MG: 1 INJECTION, SOLUTION INTRAMUSCULAR; INTRAVENOUS; SUBCUTANEOUS at 02:37

## 2021-11-10 RX ADMIN — HYDROMORPHONE HYDROCHLORIDE 1 MG: 1 INJECTION, SOLUTION INTRAMUSCULAR; INTRAVENOUS; SUBCUTANEOUS at 08:14

## 2021-11-10 RX ADMIN — DEXTROSE MONOHYDRATE AND SODIUM CHLORIDE 125 ML/HR: 5; .45 INJECTION, SOLUTION INTRAVENOUS at 01:05

## 2021-11-10 RX ADMIN — BISACODYL 10 MG: 10 SUPPOSITORY RECTAL at 15:46

## 2021-11-10 NOTE — PROGRESS NOTES
ACUTE PHYSICAL THERAPY GOALS:  (Developed with and agreed upon by patient and/or caregiver. )  LTG:  (1.)Mr. Jadyn Lindquist will move from supine to sit and sit to supine, scoot up and down and roll side to side in bed INDEPENDENTLY with bed flat within 7 treatment day(s). (2.)Mr. Jadyn Lindquist will transfer from bed to chair and chair to bed INDEPENDENTLY within 7 treatment day(s). (3.)Mr. Jadyn Lindquist will ambulate INDEPENDENTLY for 1,000 feet with intact balance within 7 treatment day(s). (4.)Mr. Jadyn Lindquist will ascend and descend 3 steps with SUPERVISION using handrail(s) within 7 days. ________________________________________________________________________________________________      PHYSICAL THERAPY ASSESSMENT: Initial Assessment and AM PT Treatment Day # 1      Jeison Tripp is a 73 y.o. male   PRIMARY DIAGNOSIS: <principal problem not specified>  Fever [R50.9]       Reason for Referral:  Fever, recent prostatectomy  ICD-10: Treatment Diagnosis: Generalized Muscle Weakness (M62.81)  Difficulty in walking, Not elsewhere classified (R26.2)  Other abnormalities of gait and mobility (R26.89)  OBSERVATION: [place] / [place] B / Product Type: Medicare /     ASSESSMENT:     REHAB RECOMMENDATIONS:   Recommendation to date pending progress:  Setting:   likely no needs pending progress  Equipment:    None     PRIOR LEVEL OF FUNCTION:  (Prior to Hospitalization) INITIAL/CURRENT LEVEL OF FUNCTION:  (Most Recently Demonstrated)   Bed Mobility:   Independent  Sit to Stand:   Independent  Transfers:   Independent  Gait/Mobility:   Independent Bed Mobility:   Not tested  Sit to Stand:   Independent  Transfers:   Independent  Gait/Mobility:   Standby Assistance     ASSESSMENT:  Mr. Jadyn Lindquist is admitted with fever due to UTI, constipation following recent prostatectomy. Pt lives alone and is independent at baseline without DME use.  Presents sitting up in chair without complaints and is agreeable to therapy. Performs sit-stand transfers independent but with c/o pain. Practiced standing activities and functional transfers in room and bathroom followed by ambulation x 500 ft in hallway with SBA/no assistive device. Pt with slow, steady pace and no major loss of balance noted. Verbal cues for gait safety, posture. Currently a little weaker than baseline but overall moving fairly well and will benefit from a few continued therapy sessions during hospital stay to maximize safety/independence with mobility. Anticipate dc home with likely no needs. SUBJECTIVE:   Mr. Gael Cruz states, \"I just walked. \"    SOCIAL HISTORY/LIVING ENVIRONMENT: Lives alone. Independent. Drives. No DME use. Was still working. Home Environment: Private residence  # Steps to Enter: 3  One/Two Story Residence: One story  Living Alone: Yes  Support Systems: Other Family Member(s)  OBJECTIVE:     PAIN: VITAL SIGNS: LINES/DRAINS:   Pre Treatment: Pain Screen  Pain Scale 1: Numeric (0 - 10)  Pain Intensity 1: 3  Pain Location 1: Back;  Abdomen  Pain Intervention(s) 1: Repositioned  Post Treatment: 0/10 Vital Signs  O2 Device: None (Room air) Hauser Catheter  O2 Device: None (Room air)     GROSS EVALUATION:   Within Functional Limits Abnormal/ Functional Abnormal/ Non-Functional (see comments) Not Tested Comments:   AROM [x] [] [] []    PROM [] [] [] []    Strength [] [x] [] []    Balance [] [x] [] []    Posture [] [x] [] []    Sensation [] [] [] []    Coordination [] [] [] []    Tone [] [] [] []    Edema [] [] [] []    Activity Tolerance [] [x] [] []     [] [] [] []      COGNITION/  PERCEPTION: Intact Impaired   (see comments) Comments:   Orientation [x] []    Vision [x] []    Hearing [x] []    Command Following [x] []    Safety Awareness [x] []     [] []      MOBILITY: I Mod I S SBA CGA Min Mod Max Total  NT x2 Comments:   Bed Mobility    Rolling [] [] [] [] [] [] [] [] [] [] []    Supine to Sit [] [] [] [] [] [] [] [] [] [] [] Scooting [] [] [] [] [] [] [] [] [] [] []    Sit to Supine [] [] [] [] [] [] [] [] [] [] []    Transfers    Sit to Stand [x] [] [] [] [] [] [] [] [] [] []    Bed to Chair [x] [] [] [] [] [] [] [] [] [] []    Stand to Sit [x] [] [] [] [] [] [] [] [] [] []    I=Independent, Mod I=Modified Independent, S=Supervision, SBA=Standby Assistance, CGA=Contact Guard Assistance,   Min=Minimal Assistance, Mod=Moderate Assistance, Max=Maximal Assistance, Total=Total Assistance, NT=Not Tested  GAIT: I Mod I S SBA CGA Min Mod Max Total  NT x2 Comments:   Level of Assistance [] [] [] [x] [] [] [] [] [] [] []    Distance 500'    DME None    Gait Quality Slow, steady    Weightbearing Status N/A     I=Independent, Mod I=Modified Independent, S=Supervision, SBA=Standby Assistance, CGA=Contact Guard Assistance,   Min=Minimal Assistance, Mod=Moderate Assistance, Max=Maximal Assistance, Total=Total Assistance, NT=Not Tested    [facility] Form       How much difficulty does the patient currently have. .. Unable A Lot A Little None   1. Turning over in bed (including adjusting bedclothes, sheets and blankets)? [] 1   [] 2   [] 3   [x] 4   2. Sitting down on and standing up from a chair with arms ( e.g., wheelchair, bedside commode, etc.)   [] 1   [] 2   [] 3   [x] 4   3. Moving from lying on back to sitting on the side of the bed? [] 1   [] 2   [] 3   [x] 4   How much help from another person does the patient currently need. .. Total A Lot A Little None   4. Moving to and from a bed to a chair (including a wheelchair)? [] 1   [] 2   [x] 3   [] 4   5. Need to walk in hospital room? [] 1   [] 2   [x] 3   [] 4   6. Climbing 3-5 steps with a railing? [] 1   [] 2   [x] 3   [] 4   © 2007, Trustees of [facility] [employer], under license to [employer].  All rights reserved     Score:  Initial: 21 Most Recent: X (Date: -- )    Interpretation of Tool:  Represents activities that are increasingly more difficult (i.e. Bed mobility, Transfers, Gait). PLAN:   FREQUENCY/DURATION: PT Plan of Care: 3 times/week for duration of hospital stay or until stated goals are met, whichever comes first.    PROBLEM LIST:   (Skilled intervention is medically necessary to address:)  1. Decreased Activity Tolerance  2. Decreased Balance  3. Decreased Gait Ability  4. Decreased Strength  5. Decreased Transfer Abilities  6. Increased Pain   INTERVENTIONS PLANNED:   (Benefits and precautions of physical therapy have been discussed with the patient.)  1. Therapeutic Activity  2. Therapeutic Exercise/HEP  3. Neuromuscular Re-education  4. Gait Training  5. Manual Therapy  6. Education     TREATMENT:     EVALUATION: Low Complexity : (Untimed Charge)    TREATMENT:   ($$ Therapeutic Activity: 8-22 mins    )  Therapeutic Activity (15 Minutes): Therapeutic activity included Scooting, Transfer Training, Ambulation on level ground, Sitting balance  and Standing balance to improve functional Mobility, Strength, Activity tolerance and balance.     TREATMENT GRID:  N/A    AFTER TREATMENT POSITION/PRECAUTIONS:  Chair, Needs within reach and RN notified    INTERDISCIPLINARY COLLABORATION:  RN/PCT and PT/PTA    TOTAL TREATMENT DURATION:  PT Patient Time In/Time Out  Time In: 1133  Time Out: 130 Flacoe Abdelrahman Fischer DPT

## 2021-11-10 NOTE — PROGRESS NOTES
[address] introduced self and offered spiritual support. Pt. is originally from [place] and has been in [place] twenty years. Pt stated that he has many people praying for him, and [facility] provided prayer. Rev. Eben López M.Div.

## 2021-11-10 NOTE — PROGRESS NOTES
In to see pt for complaints of scrotal swelling. Scrotum is slightly tender with mild swelling. Could use towel for elevation. Abdomen is distended, he reports having BM, mostly gas and liquid. Still feels constipated. Dulcolax suppository ordered now and for tomorrow AM.  Warm prune juice given. Encouraged pt to walk consistently.

## 2021-11-10 NOTE — PROGRESS NOTES
NP informed that pt feels like his testicles are more swollen now than they were earlier. NP to come & see pt.

## 2021-11-10 NOTE — PROGRESS NOTES
Urology Progress Note    Admit Date: 11/8/2021    Subjective:     Still c/o constipation; + flatus; small bm; not eating much reg food. No fevers last 24 hr; wbc coming down. GNR in urine culture; still pending. Objective:     Patient Vitals for the past 8 hrs:   BP Temp Pulse Resp SpO2   11/10/21 0813 107/76 97.8 °F (36.6 °C) 91 18 94 %   11/10/21 0236 112/70 97.9 °F (36.6 °C) 96 17 93 %     No intake/output data recorded. 11/08 1901 - 11/10 0700  In: -   Out: 7329 [Urine:1575]    Physical Exam:     Awake, alert, and oriented  Lungs no JVD. Breathing is  non-labored; no audible wheezing. Heart regular, normal perfusion  Abd soft, slightly distended  UOP clear in michael      Data Review   Recent Results (from the past 24 hour(s))   CBC W/O DIFF    Collection Time: 11/10/21  6:02 AM   Result Value Ref Range    WBC 12.1 (H) 4.3 - 11.1 K/uL    RBC 3.35 (L) 4.23 - 5.6 M/uL    HGB 11.2 (L) 13.6 - 17.2 g/dL    HCT 33.9 (L) 41.1 - 50.3 %    .2 (H) 79.6 - 97.8 FL    MCH 33.4 (H) 26.1 - 32.9 PG    MCHC 33.0 31.4 - 35.0 g/dL    RDW 12.0 11.9 - 14.6 %    PLATELET 161 376 - 272 K/uL    MPV 11.4 9.4 - 12.3 FL    ABSOLUTE NRBC 0.00 0.0 - 0.2 K/uL           Assessment:     Active Problems:    Fever (11/8/2021)    s/p RALP last week, now with fever likely to UTI and constipation. We will try and minimize narcotics.      Plan:     -bowel regimen--add fleets  -PT to walk  -d/c narcotics if possible  -reg diet  -continue rocephin and f/u cultures      Branden Rossi MD    [facility]  [address] [facility] [address]

## 2021-11-10 NOTE — PROGRESS NOTES
Problem: Falls - Risk of  Goal: *Absence of Falls  Description: Document Kyle Cote Fall Risk and appropriate interventions in the flowsheet.   Outcome: Progressing Towards Goal  Note: Fall Risk Interventions:  Mobility Interventions: Patient to call before getting OOB, PT Consult for mobility concerns         Medication Interventions: Patient to call before getting OOB, Teach patient to arise slowly    Elimination Interventions: Call light in reach, Patient to call for help with toileting needs              Problem: Patient Education: Go to Patient Education Activity  Goal: Patient/Family Education  Outcome: Progressing Towards Goal     Problem: Patient Education: Go to Patient Education Activity  Goal: Patient/Family Education  Outcome: Progressing Towards Goal     Problem: Pain  Goal: *Control of Pain  Outcome: Progressing Towards Goal

## 2021-11-10 NOTE — PROGRESS NOTES
Received pt from ZIGGY Cruz) in stable condition. Pt in bed recliner quietly. Resp even & unlabored on room air; no acute signs of distress noted. Pt c/o bad constipation & asking if he can have an enema. Also asking for pain medication. Explained that pain meds will make constipation worse. Will discuss enema with MD this a.m. Bed low & locked; call light in reach.

## 2021-11-10 NOTE — PROGRESS NOTES
Problem: Falls - Risk of  Goal: *Absence of Falls  Description: Document Thiago Bhavani Fall Risk and appropriate interventions in the flowsheet.   11/10/2021 0019 by Tia Killian I  Outcome: Progressing Towards Goal  Note: Fall Risk Interventions:  Mobility Interventions: Patient to call before getting OOB         Medication Interventions: Patient to call before getting OOB    Elimination Interventions: Call light in reach           11/10/2021 0019 by Tia Killian I  Outcome: Progressing Towards Goal  Note: Fall Risk Interventions:  Mobility Interventions: Patient to call before getting OOB         Medication Interventions: Patient to call before getting OOB    Elimination Interventions: Call light in reach              Problem: Pain  Goal: *Control of Pain  Outcome: Progressing Towards Goal

## 2021-11-11 LAB
BACTERIA SPEC CULT: ABNORMAL
SERVICE CMNT-IMP: ABNORMAL

## 2021-11-11 PROCEDURE — 65660000000 HC RM CCU STEPDOWN

## 2021-11-11 PROCEDURE — 74011000250 HC RX REV CODE- 250: Performed by: UROLOGY

## 2021-11-11 PROCEDURE — 74011250636 HC RX REV CODE- 250/636: Performed by: UROLOGY

## 2021-11-11 PROCEDURE — 74011000258 HC RX REV CODE- 258: Performed by: UROLOGY

## 2021-11-11 PROCEDURE — 74011250637 HC RX REV CODE- 250/637: Performed by: UROLOGY

## 2021-11-11 PROCEDURE — 74011250637 HC RX REV CODE- 250/637: Performed by: NURSE PRACTITIONER

## 2021-11-11 PROCEDURE — 99024 POSTOP FOLLOW-UP VISIT: CPT | Performed by: NURSE PRACTITIONER

## 2021-11-11 RX ORDER — CEPHALEXIN 250 MG/1
500 CAPSULE ORAL EVERY 6 HOURS
Status: DISCONTINUED | OUTPATIENT
Start: 2021-11-11 | End: 2021-11-11

## 2021-11-11 RX ADMIN — DEXTROSE MONOHYDRATE AND SODIUM CHLORIDE 75 ML/HR: 5; .45 INJECTION, SOLUTION INTRAVENOUS at 12:22

## 2021-11-11 RX ADMIN — CEFTRIAXONE 1 G: 1 INJECTION, POWDER, FOR SOLUTION INTRAMUSCULAR; INTRAVENOUS at 23:41

## 2021-11-11 RX ADMIN — HYDROCODONE BITARTRATE AND ACETAMINOPHEN 1 TABLET: 5; 325 TABLET ORAL at 22:27

## 2021-11-11 RX ADMIN — HYDROCODONE BITARTRATE AND ACETAMINOPHEN 1 TABLET: 5; 325 TABLET ORAL at 04:04

## 2021-11-11 RX ADMIN — DEXTROSE MONOHYDRATE AND SODIUM CHLORIDE 75 ML/HR: 5; .45 INJECTION, SOLUTION INTRAVENOUS at 23:44

## 2021-11-11 RX ADMIN — LOSARTAN POTASSIUM 100 MG: 50 TABLET, FILM COATED ORAL at 08:15

## 2021-11-11 RX ADMIN — POLYETHYLENE GLYCOL 3350 17 G: 17 POWDER, FOR SOLUTION ORAL at 08:15

## 2021-11-11 RX ADMIN — PANTOPRAZOLE SODIUM 40 MG: 40 TABLET, DELAYED RELEASE ORAL at 05:40

## 2021-11-11 RX ADMIN — LORAZEPAM 1 MG: 1 TABLET ORAL at 23:41

## 2021-11-11 RX ADMIN — HYDROCODONE BITARTRATE AND ACETAMINOPHEN 1 TABLET: 5; 325 TABLET ORAL at 10:58

## 2021-11-11 RX ADMIN — DEXTROSE MONOHYDRATE AND SODIUM CHLORIDE 125 ML/HR: 5; .45 INJECTION, SOLUTION INTRAVENOUS at 05:38

## 2021-11-11 RX ADMIN — ACETAMINOPHEN 325 MG: 325 TABLET ORAL at 10:58

## 2021-11-11 RX ADMIN — DOCUSATE SODIUM 100 MG: 100 CAPSULE, LIQUID FILLED ORAL at 08:15

## 2021-11-11 RX ADMIN — FAMOTIDINE 40 MG: 20 TABLET, FILM COATED ORAL at 22:27

## 2021-11-11 RX ADMIN — MAGNESIUM HYDROXIDE 30 ML: 2400 SUSPENSION ORAL at 22:29

## 2021-11-11 NOTE — PROGRESS NOTES
Problem: Pain  Goal: *Control of Pain  11/11/2021 0122 by Salena Melendez RN  Outcome: Progressing Towards Goal  11/11/2021 0121 by Salena Melendez RN  Outcome: Progressing Towards Goal     Problem: Falls - Risk of  Goal: *Absence of Falls  Description: Document Starleen Freeze Fall Risk and appropriate interventions in the flowsheet.   Outcome: Progressing Towards Goal  Note: Fall Risk Interventions:  Mobility Interventions: Patient to call before getting OOB, PT Consult for mobility concerns         Medication Interventions: Patient to call before getting OOB, Teach patient to arise slowly    Elimination Interventions: Call light in reach, Patient to call for help with toileting needs, Toilet paper/wipes in reach

## 2021-11-11 NOTE — PROGRESS NOTES
Admit Date: 11/8/2021    Subjective:     Jeison Tripp is currently ambulating in the room. Reports he took suppository last night, no BM but passing flatus. Abdomen slightly less distended. VSS. Objective:     Patient Vitals for the past 8 hrs:   BP Temp Pulse Resp SpO2   11/11/21 0712 114/78 98.2 °F (36.8 °C) 81 16 95 %   11/11/21 0350 104/74 98.7 °F (37.1 °C) 85 16 95 %     No intake/output data recorded. 11/09 1901 - 11/11 0700  In: 6090 [P.O.:550; I.V.:5540]  Out: 8156 [Urine:3275]    Physical Exam:  GENERAL: alert, cooperative, no distress  LUNG: clear to auscultation bilaterally  HEART: regular rate and rhythm, S1, S2   ABDOMEN: soft, non-tender  NEUROLOGIC: AOx3      Data Review No results found for this or any previous visit (from the past 24 hour(s)). Assessment:     Active Problems:    Fever (11/8/2021)      S/P RALP last week, had fever likely to UTI and constipation. Narcotics minimized. Pt passing flatus and ambulating. Afebrile. Urine culture with E Coli. Rocephin completed. Plan:     Continue to ambulate. Start PO keflex. Hopefully we will remove michael tomorrow AM and he will d/c home.      Lynn Joseph NP  [facility] [place] [facility]

## 2021-11-12 VITALS
HEART RATE: 79 BPM | BODY MASS INDEX: 29.2 KG/M2 | HEIGHT: 70 IN | RESPIRATION RATE: 17 BRPM | OXYGEN SATURATION: 96 % | TEMPERATURE: 98.1 F | DIASTOLIC BLOOD PRESSURE: 69 MMHG | SYSTOLIC BLOOD PRESSURE: 103 MMHG | WEIGHT: 204 LBS

## 2021-11-12 LAB
ANION GAP SERPL CALC-SCNC: 8 MMOL/L (ref 7–16)
BUN SERPL-MCNC: 7 MG/DL (ref 8–23)
CALCIUM SERPL-MCNC: 8.5 MG/DL (ref 8.3–10.4)
CHLORIDE SERPL-SCNC: 106 MMOL/L (ref 98–107)
CO2 SERPL-SCNC: 26 MMOL/L (ref 21–32)
CREAT SERPL-MCNC: 0.91 MG/DL (ref 0.8–1.5)
ERYTHROCYTE [DISTWIDTH] IN BLOOD BY AUTOMATED COUNT: 11.9 % (ref 11.9–14.6)
GLUCOSE BLD STRIP.AUTO-MCNC: 100 MG/DL (ref 65–100)
GLUCOSE SERPL-MCNC: 106 MG/DL (ref 65–100)
HCT VFR BLD AUTO: 30.3 % (ref 41.1–50.3)
HGB BLD-MCNC: 10 G/DL (ref 13.6–17.2)
MCH RBC QN AUTO: 33.1 PG (ref 26.1–32.9)
MCHC RBC AUTO-ENTMCNC: 33 G/DL (ref 31.4–35)
MCV RBC AUTO: 100.3 FL (ref 79.6–97.8)
NRBC # BLD: 0 K/UL (ref 0–0.2)
PLATELET # BLD AUTO: 147 K/UL (ref 150–450)
PMV BLD AUTO: 11.7 FL (ref 9.4–12.3)
POTASSIUM SERPL-SCNC: 3.3 MMOL/L (ref 3.5–5.1)
RBC # BLD AUTO: 3.02 M/UL (ref 4.23–5.6)
SERVICE CMNT-IMP: NORMAL
SODIUM SERPL-SCNC: 140 MMOL/L (ref 136–145)
WBC # BLD AUTO: 5.9 K/UL (ref 4.3–11.1)

## 2021-11-12 PROCEDURE — 36415 COLL VENOUS BLD VENIPUNCTURE: CPT

## 2021-11-12 PROCEDURE — 74011250637 HC RX REV CODE- 250/637: Performed by: UROLOGY

## 2021-11-12 PROCEDURE — 80048 BASIC METABOLIC PNL TOTAL CA: CPT

## 2021-11-12 PROCEDURE — 82962 GLUCOSE BLOOD TEST: CPT

## 2021-11-12 PROCEDURE — 85027 COMPLETE CBC AUTOMATED: CPT

## 2021-11-12 PROCEDURE — 74011250637 HC RX REV CODE- 250/637: Performed by: NURSE PRACTITIONER

## 2021-11-12 RX ORDER — HYDROCODONE BITARTRATE AND ACETAMINOPHEN 5; 325 MG/1; MG/1
1 TABLET ORAL
Qty: 15 TABLET | Refills: 0 | Status: SHIPPED | OUTPATIENT
Start: 2021-11-12 | End: 2021-11-15

## 2021-11-12 RX ORDER — DOCUSATE SODIUM 100 MG/1
100 CAPSULE, LIQUID FILLED ORAL DAILY
Qty: 30 CAPSULE | Refills: 2 | Status: SHIPPED | OUTPATIENT
Start: 2021-11-13 | End: 2022-02-11

## 2021-11-12 RX ORDER — CEPHALEXIN 500 MG/1
500 CAPSULE ORAL 4 TIMES DAILY
Qty: 12 CAPSULE | Refills: 0 | Status: SHIPPED | OUTPATIENT
Start: 2021-11-12

## 2021-11-12 RX ADMIN — POLYETHYLENE GLYCOL 3350 17 G: 17 POWDER, FOR SOLUTION ORAL at 08:02

## 2021-11-12 RX ADMIN — LOSARTAN POTASSIUM 100 MG: 50 TABLET, FILM COATED ORAL at 08:03

## 2021-11-12 RX ADMIN — PANTOPRAZOLE SODIUM 40 MG: 40 TABLET, DELAYED RELEASE ORAL at 05:46

## 2021-11-12 RX ADMIN — DOCUSATE SODIUM 100 MG: 100 CAPSULE, LIQUID FILLED ORAL at 08:03

## 2021-11-12 RX ADMIN — HYDROCODONE BITARTRATE AND ACETAMINOPHEN 1 TABLET: 5; 325 TABLET ORAL at 09:49

## 2021-11-12 NOTE — PROGRESS NOTES
Urology Progress Note    Admit Date: 11/8/2021    Subjective:     Patient has no new complaints. Raphael diet; +bm and flatus; some confusion overnight but clear this AM. WBC normal. No fevers x 2 days. Objective:     Patient Vitals for the past 8 hrs:   BP Temp Pulse Resp SpO2   11/12/21 0318 (!) 153/95 97.3 °F (36.3 °C) 94 16 93 %   11/12/21 0013 97/65 98.5 °F (36.9 °C) 70 16 94 %     No intake/output data recorded. 11/10 1901 - 11/12 0700  In: 6595 [P.O.:240; I.V.:6355]  Out: 4575 [Urine:4575]    Physical Exam:     Awake, alert, and oriented  Lungs no JVD. Breathing is  non-labored; no audible wheezing. Heart regular, normal perfusion  Abd soft, nt, nd  UOP clear      Data Review   Recent Results (from the past 24 hour(s))   GLUCOSE, POC    Collection Time: 11/12/21  3:58 AM   Result Value Ref Range    Glucose (POC) 100 65 - 100 mg/dL    Performed by Elen    CBC W/O DIFF    Collection Time: 11/12/21  4:48 AM   Result Value Ref Range    WBC 5.9 4.3 - 11.1 K/uL    RBC 3.02 (L) 4.23 - 5.6 M/uL    HGB 10.0 (L) 13.6 - 17.2 g/dL    HCT 30.3 (L) 41.1 - 50.3 %    .3 (H) 79.6 - 97.8 FL    MCH 33.1 (H) 26.1 - 32.9 PG    MCHC 33.0 31.4 - 35.0 g/dL    RDW 11.9 11.9 - 14.6 %    PLATELET 082 (L) 951 - 450 K/uL    MPV 11.7 9.4 - 12.3 FL    ABSOLUTE NRBC 0.00 0.0 - 0.2 K/uL   METABOLIC PANEL, BASIC    Collection Time: 11/12/21  4:48 AM   Result Value Ref Range    Sodium 140 136 - 145 mmol/L    Potassium 3.3 (L) 3.5 - 5.1 mmol/L    Chloride 106 98 - 107 mmol/L    CO2 26 21 - 32 mmol/L    Anion gap 8 7 - 16 mmol/L    Glucose 106 (H) 65 - 100 mg/dL    BUN 7 (L) 8 - 23 MG/DL    Creatinine 0.91 0.8 - 1.5 MG/DL    GFR est AA >60 >60 ml/min/1.73m2    GFR est non-AA >60 >60 ml/min/1.73m2    Calcium 8.5 8.3 - 10.4 MG/DL           Assessment:     Active Problems:    Fever (11/8/2021)    s/p RALP 10 days ago. Fever likely a UTI. Urine culture showed pansensitive e. Coli. Last does of rocephin last night.  Pt also has lymphoceles on CT. Plan:     -D/C michael this am  -keflex x 3 more days  -Home today  -FU with me in 2 weeks.        Jovan Morris MD    [facility]  [address] [facility]

## 2021-11-12 NOTE — DISCHARGE SUMMARY
Discharge Summary     Patient: Ruperto Arora MRN: [id number]  SSN: [id number]    YOB: 1955  Age: 73 y.o. Sex: male      Allergies: Monosodium glutamate, Other medication, and Venom-yellow jacket    Admit Date: 11/8/2021    Discharge Date: 11/12/2021     * Admission Diagnoses:  Fever [R50.9]     * Discharge Diagnoses:   Hospital Problems as of 11/12/2021 Date Reviewed: 7/30/2021          Codes Class Noted - Resolved POA    Fever ICD-10-CM: R50.9  ICD-9-CM: 780.60  11/8/2021 - Present Unknown               * Procedures for this admission:   * No surgery found *      * Disposition: Home    * Discharged Condition: improved    * Hospital Course:      72 yo male with hx of prostate cancer, s/p RALP 11/2/21. Presented to ER on 11/8 with c/o fever, chills, malodorous urine and constipation. Found to have UTI. CT shows lymphoceles. Urine culture showed pansensitive e. Coli. Treated with IV rocephin. Pt has had BM, passing flatus and tolerating food. He has ambulated. He is feeling better and afebrile. Hauser d/c'd today and he has voided. He will d/c home with PO keflex and he will follow up with Dr. Evelina Bang in 2 weeks. Patient Active Problem List   Diagnosis Code    BPH associated with nocturia N40.1, R35.1    Urinary frequency R35.0    Urinary urgency R39.15    Prostate cancer ([facility] [address] C61    Fever R50.9             Discharge Medications:   Discharge Medication List as of 11/12/2021  9:45 AM      START taking these medications    Details   docusate sodium (COLACE) 100 mg capsule Take 1 Capsule by mouth daily for 90 days. , Normal, Disp-30 Capsule, R-2      HYDROcodone-acetaminophen (NORCO) 5-325 mg per tablet Take 1 Tablet by mouth every four (4) hours as needed for Pain for up to 3 days. Max Daily Amount: 6 Tablets., Normal, Disp-15 Tablet, R-0      cephALEXin (KEFLEX) 500 mg capsule Take 1 Capsule by mouth four (4) times daily. , Normal, Disp-12 Capsule, R-0         CONTINUE these medications which have NOT CHANGED    Details   senna-docusate (PERICOLACE) 8.6-50 mg per tablet Take 1 Tablet by mouth daily. , Normal, Disp-30 Tablet, R-0      ondansetron (ZOFRAN ODT) 4 mg disintegrating tablet Take 1 Tablet by mouth every eight (8) hours as needed for Nausea or Vomiting., Normal, Disp-30 Tablet, R-0      ZINC PO Take  by mouth daily. , Historical Med      omeprazole (PRILOSEC) 40 mg capsule Take 40 mg by mouth daily. , Historical Med      famotidine (PEPCID) 40 mg tablet Take 40 mg by mouth nightly., Historical Med      cyclobenzaprine (FLEXERIL) 5 mg tablet Take 5 mg by mouth as needed., Historical Med      traMADoL (ULTRAM) 50 mg tablet Take 50 mg by mouth every eight (8) hours as needed., Historical Med      losartan (COZAAR) 100 mg tablet Take 100 mg by mouth daily. , Historical Med      fluticasone propionate (FLONASE) 50 mcg/actuation nasal spray 2 Sprays by Both Nostrils route daily as needed., Historical Med      finasteride (PROSCAR) 5 mg tablet Take 1 Tablet by mouth daily. , Normal, Disp-90 Tablet, R-4      LORazepam (ATIVAN) 1 mg tablet Take 1 mg by mouth nightly., Historical Med      tamsulosin (FLOMAX) 0.4 mg capsule Take 1 Cap by mouth daily. , Normal, Disp-90 Cap, R-4      OTHER 6 Capsules daily. Balance of nature., Historical Med         STOP taking these medications       oxybutynin chloride XL (DITROPAN XL) 10 mg CR tablet Comments:   Reason for Stopping:         ciprofloxacin HCl (CIPRO) 500 mg tablet Comments:   Reason for Stopping:                * Follow-up Care/Patient Instructions: Activity: no heavy lifting, pushing, pulling, avoid straining, recommend colace daily.    Diet: Regular Diet  Wound Care: None needed    Follow-up Information     Follow up With Specialties Details Why Contact Info    Bruce Jefferson MD Internal Medicine  11/12/21 - Patient need to call office to schedule a 1 week hospital follow-up [address] [place] [id number]  [phone]

## 2021-11-12 NOTE — PROGRESS NOTES
I have reviewed discharge instructions with the patient. The patient verbalized understanding. PIV removed. Pt victoria diet, pain controlled with prn Norco.  Abdomen with some bloating per pt; taking colace and miralax as instructed. Having incontinence after michael removal.  Follow up as instructed, scripts sent. No further questions at this time.

## 2021-11-12 NOTE — PROGRESS NOTES
Problem: Falls - Risk of  Goal: *Absence of Falls  Description: Document Barbie Carranza Fall Risk and appropriate interventions in the flowsheet.   Outcome: Progressing Towards Goal  Note: Fall Risk Interventions:  Mobility Interventions: Patient to call before getting OOB, PT Consult for mobility concerns         Medication Interventions: Patient to call before getting OOB, Teach patient to arise slowly    Elimination Interventions: Call light in reach, Patient to call for help with toileting needs              Problem: Pain  Goal: *Control of Pain  Outcome: Progressing Towards Goal

## 2021-11-12 NOTE — PROGRESS NOTES
Pt to d/c home today. He has a friend who will provide transportation. PT consult was completed and no further skilled therapy recommended. No DME needed. Pt on RA. No supportive care needs identified. Milestones met. Care Management Interventions  PCP Verified by CM: Yes Corinn Ahumada, MD)  Mode of Transport at Discharge: Other (see comment) (Friend)  Transition of Care Consult (CM Consult): Discharge Planning  Discharge Durable Medical Equipment: No  Physical Therapy Consult: Yes  Occupational Therapy Consult: No  Speech Therapy Consult: No  Support Systems: Other Family Member(s)  Confirm Follow Up Transport: Friends  The Plan for Transition of Care is Related to the Following Treatment Goals : Pt to obtain care to become medically stable and to return with a safe transition.   The Patient and/or Patient Representative was Provided with a Choice of Provider and Agrees with the Discharge Plan?: Yes  Name of the Patient Representative Who was Provided with a Choice of Provider and Agrees with the Discharge Plan: Nataly Kingsley of Choice List was Provided with Basic Dialogue that Supports the Patient's Individualized Plan of Care/Goals, Treatment Preferences and Shares the Quality Data Associated with the Providers?: Yes   Resource Information Provided?: No  Discharge Location  Discharge Placement: Home

## 2021-11-12 NOTE — DISCHARGE INSTRUCTIONS
Patient Education        Constipation: Care Instructions  Your Care Instructions     Constipation means that you have a hard time passing stools (bowel movements). People pass stools from 3 times a day to once every 3 days. What is normal for you may be different. Constipation may occur with pain in the rectum and cramping. The pain may get worse when you try to pass stools. Sometimes there are small amounts of bright red blood on toilet paper or the surface of stools. This is because of enlarged veins near the rectum (hemorrhoids). A few changes in your diet and lifestyle may help you avoid ongoing constipation. Your doctor may also prescribe medicine to help loosen your stool. Some medicines can cause constipation. These include pain medicines and antidepressants. Tell your doctor about all the medicines you take. Your doctor may want to make a medicine change to ease your symptoms. Follow-up care is a key part of your treatment and safety. Be sure to make and go to all appointments, and call your doctor if you are having problems. It's also a good idea to know your test results and keep a list of the medicines you take. How can you care for yourself at home? · Drink plenty of fluids. If you have kidney, heart, or liver disease and have to limit fluids, talk with your doctor before you increase the amount of fluids you drink. · Include high-fiber foods in your diet each day. These include fruits, vegetables, beans, and whole grains. · Get at least 30 minutes of exercise on most days of the week. Walking is a good choice. You also may want to do other activities, such as running, swimming, cycling, or playing tennis or team sports. · Take a fiber supplement, such as Citrucel or Metamucil, every day. Read and follow all instructions on the label. · Schedule time each day for a bowel movement. A daily routine may help. Take your time having your bowel movement.   · Support your feet with a small step stool when you sit on the toilet. This helps flex your hips and places your pelvis in a squatting position. · Your doctor may recommend an over-the-counter laxative to relieve your constipation. Examples are Milk of Magnesia and MiraLax. Read and follow all instructions on the label. Do not use laxatives on a long-term basis. When should you call for help? Call your doctor now or seek immediate medical care if:    · You have new or worse belly pain.     · You have new or worse nausea or vomiting.     · You have blood in your stools. Watch closely for changes in your health, and be sure to contact your doctor if:    · Your constipation is getting worse.     · You do not get better as expected. Where can you learn more? Go to http://www.moran.com/  Enter P343 in the search box to learn more about \"Constipation: Care Instructions. \"  Current as of: July 1, 2021               Content Version: 13.0  © 2006-2021 [employer]. Care instructions adapted under license by [employer] (which disclaims liability or warranty for this information). If you have questions about a medical condition or this instruction, always ask your healthcare professional. [address] any warranty or liability for your use of this information.

## 2021-11-12 NOTE — PROGRESS NOTES
While rounding, I found the patient with his michael disconnected from the drainage bag, and his IV was broken. The patient stated \"something aint  right, Im in a bad dream\" and has repeatedly asked \"where am I'. The patient was previously AO x4 and is now appearing to be skittish and tearful. Dr. Proctor Serum notified and stated to keep and eye oh him, and if needed to give another ativan.

## 2021-11-13 LAB
BACTERIA SPEC CULT: NORMAL
BACTERIA SPEC CULT: NORMAL
SERVICE CMNT-IMP: NORMAL
SERVICE CMNT-IMP: NORMAL

## 2021-11-15 NOTE — PROGRESS NOTES
Physician Progress Note      Jose Peterson  CSN #:                  [id number]  :                       1955  ADMIT DATE:       2021 3:02 PM  [address] DATE:        2021 11:34 AM  RESPONDING  PROVIDER #:        Mary Ramirez NP          QUERY TEXT:    Pt admitted with fever and UTI documented. As per documentation, patient admitted with michael catheter s/p prostatectomy on . Please document in the progress notes and discharge summary if you are evaluating and/or treating any of the following: The medical record reflects the following:  Risk Factors: indwelling michael, recent prostatectomy  Clinical Indicators: Documentation of UTI. UA with trace LE, 50 to 100 WBC. Urine culture escherichia coli  Treatment: Rocephin  Options provided:  -- UTI due to  indwelling urinary catheter  -- UTI as a complication of prostatectomy on , and not due to catheter  -- UTI not a complication of prostatectomy on , but due to catheter  -- UTI is not due to catheter or prostatectomy on   -- Other - I will add my own diagnosis  -- Disagree - Not applicable / Not valid  -- Disagree - Clinically unable to determine / Unknown  -- Refer to Clinical Documentation Reviewer    PROVIDER RESPONSE TEXT:    UTI is due to the indwelling urinary catheter.     Query created by: Miguel Angel Vasquez on 2021 9:59 AM      Electronically signed by:  Mary Ramirez NP 11/15/2021 12:50 PM

## 2021-11-24 NOTE — PROGRESS NOTES
Physician Progress Note      Neri Luna  YOHANNES #:                  [id number]  :                       1955  ADMIT DATE:       2021 3:02 PM  [address] [place] DATE:        2021 11:34 AM  RESPONDING  PROVIDER #:        Ely Gray NP          QUERY TEXT:    Patient admitted to observation status on  with fever/UTI. Noted inpatient admission order 11/10. If possible, please document in progress notes and discharge summary the reason for inpatient admission. The medical record reflects the following:  Risk Factors: e coli UTI d/t indwelling michael catheter  Clinical Indicators:  on arrival for observation status T 100.6, W 13.7 On inpatient admission WBC 12, fevers had resolved. Treatment: antibiotics IV  Options provided:  -- Inpatient admission for Sepsis secondary to UTI d/t indwelling urinary catheter. -- Inpatient admission for UTI due to indwelling urinary catheter. -- Other - I will add my own diagnosis  -- Disagree - Not applicable / Not valid  -- Disagree - Clinically unable to determine / Unknown  -- Refer to Clinical Documentation Reviewer    PROVIDER RESPONSE TEXT:    Provider is clinically unable to determine a response to this query. Query created by:  Sophie Busch on 2021 4:02 PM      Electronically signed by:  Ely Gray NP 2021 11:13 AM

## 2021-12-03 ENCOUNTER — HOSPITAL ENCOUNTER (OUTPATIENT)
Dept: LAB | Age: 66
Discharge: HOME | End: 2021-12-03
Payer: MEDICARE

## 2021-12-03 LAB
ANION GAP SERPL CALC-SCNC: 6 MMOL/L (ref 7–16)
BUN SERPL-MCNC: 14 MG/DL (ref 8–23)
CALCIUM SERPL-MCNC: 8.8 MG/DL (ref 8.3–10.4)
CHLORIDE SERPL-SCNC: 110 MMOL/L (ref 98–107)
CO2 SERPL-SCNC: 23 MMOL/L (ref 21–32)
CREAT SERPL-MCNC: 1.1 MG/DL (ref 0.8–1.5)
GLUCOSE SERPL-MCNC: 125 MG/DL (ref 65–100)
POTASSIUM SERPL-SCNC: 3.6 MMOL/L (ref 3.5–5.1)
SODIUM SERPL-SCNC: 139 MMOL/L (ref 136–145)

## 2021-12-03 PROCEDURE — 80048 BASIC METABOLIC PNL TOTAL CA: CPT

## 2021-12-03 PROCEDURE — 36415 COLL VENOUS BLD VENIPUNCTURE: CPT

## 2021-12-06 ENCOUNTER — HOSPITAL ENCOUNTER (OUTPATIENT)
Dept: LAB | Age: 66
Discharge: HOME | End: 2021-12-06
Payer: MEDICARE

## 2021-12-06 DIAGNOSIS — C61 PROSTATE CANCER (HCC): ICD-10-CM

## 2021-12-06 PROCEDURE — 84153 ASSAY OF PSA TOTAL: CPT

## 2021-12-06 PROCEDURE — 36415 COLL VENOUS BLD VENIPUNCTURE: CPT

## 2021-12-07 LAB — PSA SERPL DL<=0.01 NG/ML-MCNC: 0.01 NG/ML (ref 0–4)

## 2022-01-07 ENCOUNTER — HOSPITAL ENCOUNTER (OUTPATIENT)
Dept: LAB | Age: 67
Discharge: HOME | End: 2022-01-07
Payer: MEDICARE

## 2022-01-07 DIAGNOSIS — N39.0 URINARY TRACT INFECTION WITHOUT HEMATURIA, SITE UNSPECIFIED: ICD-10-CM

## 2022-01-07 LAB
APPEARANCE UR: CLEAR
BILIRUB UR QL: NEGATIVE
COLOR UR: YELLOW
GLUCOSE UR STRIP.AUTO-MCNC: NEGATIVE MG/DL
HGB UR QL STRIP: NEGATIVE
KETONES UR QL STRIP.AUTO: NEGATIVE MG/DL
LEUKOCYTE ESTERASE UR QL STRIP.AUTO: NEGATIVE
NITRITE UR QL STRIP.AUTO: NEGATIVE
PH UR STRIP: 6.5 [PH] (ref 5–9)
PROT UR STRIP-MCNC: NEGATIVE MG/DL
SP GR UR REFRACTOMETRY: 1.02 (ref 1–1.02)
UROBILINOGEN UR QL STRIP.AUTO: 0.2 EU/DL (ref 0.2–1)

## 2022-01-07 PROCEDURE — 81003 URINALYSIS AUTO W/O SCOPE: CPT

## 2022-01-07 PROCEDURE — 87086 URINE CULTURE/COLONY COUNT: CPT

## 2022-01-10 ENCOUNTER — HOSPITAL ENCOUNTER (OUTPATIENT)
Dept: PHYSICAL THERAPY | Age: 67
Discharge: HOME | End: 2022-01-10
Attending: UROLOGY
Payer: MEDICARE

## 2022-01-10 DIAGNOSIS — C61 PROSTATE CANCER (HCC): ICD-10-CM

## 2022-01-10 LAB
BACTERIA SPEC CULT: NORMAL
SERVICE CMNT-IMP: NORMAL

## 2022-01-10 PROCEDURE — 97162 PT EVAL MOD COMPLEX 30 MIN: CPT

## 2022-01-10 PROCEDURE — 97110 THERAPEUTIC EXERCISES: CPT

## 2022-01-10 PROCEDURE — 97530 THERAPEUTIC ACTIVITIES: CPT

## 2022-01-10 NOTE — PROGRESS NOTES
Tracy Rock  : 1955  [place]  Secondary: Amos Bowers at [place] [id number] [facility], [address] [place] [place] [address],[address], [facility] [address]  [phone]   [phone]          OUTPATIENT PHYSICAL THERAPY: Daily Treatment Note 1/10/2022   Visit Count:  1   Lack of coordination (muscle incoordination) (R27.8), Stress incontinence (female) (male) (N39.3) and Lower abdominal pain, unspecified (R10.30)  Pre-treatment Symptoms/Complaints:  JEREMIAH    Pain: Initial:   0/10 Post Session:  0/10   Medications Last Reviewed:  1/10/2022  Updated Objective Findings:  See evaluation note from today   TREATMENT:     THERAPEUTIC EXERCISE: (10 minutes):  Exercises per grid below to improve mobility, strength, and coordination. Required minimal visual and verbal cues to promote proper body mechanics and promote proper body breathing techniques. Progressed resistance, range, repetitions, and complexity of movement as indicated. Date:  1/10/22 Date:   Date:     TE Parameters Parameters Parameters   HEP - kegal program 3x/day:  10 x 10 sec hold  3 x 5 quick flicks     Kegals Completed with sEMG biofeedback in session - side-lying                                     THERAPEUTIC ACTIVITY: (15 minutes): Functional activity bladder health education on avoiding bladder irritants to reduce episodes of urge urinary incontinence and urgency/frequency. Patient was provided a list of common bladder irritants including caffeine, carbonation, alcohol, artificial sweeteners, chocolate, acidic drinks, and tomato based drinks. and Instruction on coordinated pelvic floor and diaphragmatic breathing to improve kinesthetic awareness of pelvic muscle mobility and restore proper motor recruitment patterns with breathing, posture, and functional movement (e.g. appropriate lift/contraction with increased IAP such as a cough, laugh, sneeze to prevent incontinence).      [facility] Educational Topic Notes Date Completed   Pathology/Anatomy/PFM Function Completed 1/10/22   Bladder health education Completed 1/10/22   Urinary urge suppression     The yesica     Voiding strategies     Bowel/Bladder log     Bowel health education     Constipation care     Diarrhea/Fecal leakage     Colonic massage     Toilet positioning     Defecation dynamics     Sources of fiber     Return to intercourse/Dilator training     Sexual positioning     Lubricants/vaginal moisturizers     Vaginal irritants     Body mechanics     Posture/ergonomics     Diaphragmatic breathing     Resources and technology       MANUAL THERAPY: (5 minutes): Soft tissue mobilization was utilized and necessary because of the patient's restricted motion of soft tissue. Date Type Location Comments   1/10/22 CTM Abdomina wall - inge-incisional                                  (Used abbreviations: MET - muscle energy technique; SCS- Strain counter strain; CTM-Connective tissue mobilizations; CR- Contract/relax; SP- Sustained pressure, TrP-Trigger point release, IASTM- Instrument assisted soft tissue mobilizations, TDN-Trigger point dry needling)      Treatment/Session Summary:    · Response to Treatment:  Pt reports good understanding of plan of care, as well as prescribed home exercise program.  All questions were answered to pt's satisfaction. Pt was invited to call with any further questions or concerns. · Communication/Consultation:  None today  · Equipment provided today:  None today  · Variation from POC:  · Recommendations/Intent for next treatment session: Next visit will focus on urge suppression training, toilet positioning for bowel evacuation, lifting mechanics, CTM through abdominal wall and gentle mobility.   Total Treatment Billable Duration: Evaluation 30 minutes, treatment 30 minutes     Shade Tenorio PT    Visit # Therapist initials Date Arrived NS/ Cx < 24 hr >24 hr Cx Visit Comments   1 MT 1/10/22 Yes   Initial Assessment and Treatment                                                                                                                                                              Abbreviations: NS = No Show; CX = cancelled     Future Appointments   Date Time Provider Jesus Marichuy   1/18/2022  3:00 PM Amber Altnoman, PT [facility]   1/25/2022  3:00 PM Amber Altes, PT [facility]   2/1/2022  3:00 PM Amber Altes, PT [facility]   2/8/2022  3:00 PM Amber Altes, PT [facility]   2/15/2022  3:00 PM Amber Altes, PT [facility]   2/22/2022  3:00 PM Amber Altes, PT [facility]   4/8/2022 10:20 AM [facility] [employer] [facility]   4/8/2022 10:45 AM Robby Gould MD [facility]

## 2022-01-10 NOTE — THERAPY EVALUATION
Abhi Monroe  : 1955  [place]  Secondary: Amos Bowers at [address], [address] [place] [address],[address], [address] [facility]  [phone]   [phone]        OUTPATIENT PHYSICAL THERAPY:Initial Assessment 1/10/2022    ICD-10: Treatment Diagnosis: Lack of coordination (muscle incoordination) (R27.8), Stress incontinence (female) (male) (N39.3) and Lower abdominal pain, unspecified (R10.30)  Precautions/Allergies:   Monosodium glutamate, Other medication, and Venom-yellow jacket   TREATMENT PLAN:  Effective Dates: 1/10/2022 TO 3/9/2022 (90 days). Frequency/Duration: 1x/week for 90 days MEDICAL/REFERRING DIAGNOSIS:  Prostate cancer [facility]) Fatmata Dinning   DATE OF ONSET: December   REFERRING PHYSICIAN: Charisma Garsia MD MD Orders: evaluate and treat  Return MD Appointment: March       INITIAL ASSESSMENT:  Mr. Saran Galdamez presents with decreased strength and endurance of pelvic floor muscles and increased tenderness and reduced connective tissue through his abdominal wall post operative RALP. Today he was educated on bladder health, kegel exercises, pelvic floor anatomy and role of musculature, and precautions with completion of strenuous activities. He required verbal cuing for proper activation and isolation of pelvic floor muscles. He was able to contract muscle for 10 seconds with minimal breath holding and accessory muscle use of gluteals. He was given kegel exercises to do at home to assist in maintaining continence. He was able to demonstrate improved coordination by the end of the session today. He will continue to benefit from skilled physical therapy to address above mentioned deficits and restore normal PFM function post operatively to minimize urinary incontinence. PROBLEM LIST (Impacting functional limitations):  1. Decreased Strength  2. Decreased ADL/Functional Activities  3.  Decreased Activity Tolerance  4. Decreased Flexibility/Joint Mobility  5. Decreased [place] with Home Exercise Program  6. Decreased strength of pelvic floor which limits bladder control INTERVENTIONS PLANNED:  1. Neuromuscular re-education  2. Biofeedback as needed  3. HEP  4. Bladder retraining  5. Bladder education  6. Manual Therapy  7. Therapeutic Activites  8. Therapeutic Exercise/Strengthening     GOALS: (Goals have been discussed and agreed upon with patient.)  Short Term Functional Goals: Time frame: 4 weeks  1. Pt will demonstrate independence with basic PFM HEP to improve awareness, coordination, and timing of PFM. 2. Pt will demonstrate understanding of and ability to teach back appropriate water intake, bladder irritants, toileting frequency, and positioning for improved self-management of symptoms. 3. Pt will demonstrate ability to isolate a pelvic floor contraction without breath holding and minimal to no accessory muscle use in order to implement the knack and/or urge suppression, reducing pad usage by 50%. 4. Pt will demonstrate ability to perform diaphragmatic breathing and quick flick pelvic floor contractions in order to implement urge suppression and reduce episodes of UI by 50%. 5. Pt will demonstrate ability to perform a coordinate PFM and TA contraction during exhalation for improve coordination with functional tasks such as lifting, bending, transitioning in/out of chairs. Discharge Goals: Time frame: 8 weeks  1. Pt will demonstrate ability to voluntarily contract the pelvic floor muscles prior to a cough or sneeze for decreased leakage during increases in intra-abdominal pressure. 2. 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  3. Pt will demonstrate independence with an advanced HEP for general conditioning, core stabilization, and mobility to facilitate carry over and independent management of symptoms.    4. Pt will be independent in implementation of a timed voiding schedule and use of urge suppression to reduce urinary frequency to 8-10/day and 2/night. 5. Pt will no longer require use of pads. Outcome Measure:   NIH: pain: 0, Urinary Symptoms 5, Quality of Life: 7    Medical Necessity:   · Patient is expected to demonstrate progress in strength, coordination and functional technique to minimize UI following prostatectomy. Reason for Services/Other Comments:  · Patient continues to require skilled intervention due to above mentioned deficits. ·   Total Duration: 60 minutes        Rehabilitation Potential For Stated Goals: Excellent  Regarding Mejia Bellamy's therapy, I certify that the treatment plan above will be carried out by a therapist or under their direction. Thank you for this referral,  Matt Tafoya PT     Referring Physician Signature: Camacho Montenegro MD _______________________________ Date _____________            PAIN/SUBJECTIVE:   Initial:   0/10 Post Session:  0/10      HISTORY:   History of Present Injury/Illness (Reason for Referral):  Pt is a 72 yo male referred to pelvic floor physical therapy (PFPT) 2/2 prostate cancer by Camacho Montenegro MD. Pt is currently 4 weeks post-operative RALP and hernia repair. He reports intermittent left inguinal discomfort. Described as sharp when it occurs. Not always associated with a transitional movements. Denies perineal discomfort. Denies dysuria. Denies urinary hesitancy. Intermittent discomfort with BM's. He is still taking a stool softener but needing to strain and groan to evacuate. Pt has had 3 UTI's since the procedure was completed. Cath was in for 10 days. 7 days after the procedure he had to be admitted to the hospital for 4 days with the UTI. Returned to work 3 weeks ago. He works in construction/tile. Urinary: Frequency 13+x/day, hourly and waking up with strong urinary urgency. The last 3 nights he slept without needing a diaper. Pre surgery he was waking up 3x/night. Positive for stress urinary incontinence. Positive for JEREMIAH with coughing, sneezing, lifting, transitioning. Negative for urinary urgency, incomplete emptying, urinary hesitancy/intermittency, dysuria and hematuria. Pt does use pads for protection; 2 diapers per day (PPD). Sexual: unable to have erection at this time. Fluid intake: 2 8 oz water/day; bladder irritants include: milk, 1 cup of coffee in am, beer. Other medical history: Back pain - currently managed with chiropractic care (30 years). Past Medical History/Comorbidities:   Mr. Harmony Ardon  has a past medical history of Arthritis, Cancer ([address] Chronic pain, Emphysema lung ([address] (2021), Former smoker, stopped smoking in distant past, GERD (gastroesophageal reflux disease), Hypertension, and Sleep disorder. Mr. Harmony Ardon  has a past surgical history that includes hx colonoscopy (~2007); hx wisdom teeth extraction; hx urological; and hx endoscopy. Social History/Living Environment:   Pt lives with self  Alcohol use? Intermittent   Tobacco use? Quit smoking 11 years ago  Sexual abuse? no    Prior Level of Function/Work/Activity:  Prior level of function: WFL  Occupation: Construction/tile  Exercise activities: Walking,     Personal Factors:          Sex:  male     Ambulatory/Rehab Services H2 Model Falls Risk Assessment    Risk Factors:       No Risk Factors Identified Ability to Rise from Chair:       (0)  Ability to rise in a single movement    Falls Prevention Plan:       No modifications necessary   Total: (5 or greater = High Risk): 0    ©2010 [facility] Patent #[id number]. Federal Law prohibits the replication, distribution or use without written permission from [facility] 8aweek     Current Medications:    Current Outpatient Medications:     docusate sodium (COLACE) 100 mg capsule, Take 1 Capsule by mouth daily for 90 days. , Disp: 30 Capsule, Rfl: 2    cephALEXin (Jepppravin Silverio) 500 mg capsule, Take 1 Capsule by mouth four (4) times daily. , Disp: 12 Capsule, Rfl: 0    senna-docusate (PERICOLACE) 8.6-50 mg per tablet, Take 1 Tablet by mouth daily. , Disp: 30 Tablet, Rfl: 0    ondansetron (ZOFRAN ODT) 4 mg disintegrating tablet, Take 1 Tablet by mouth every eight (8) hours as needed for Nausea or Vomiting., Disp: 30 Tablet, Rfl: 0    ZINC PO, Take  by mouth daily. , Disp: , Rfl:     OTHER, 6 Capsules daily. Balance of nature., Disp: , Rfl:     omeprazole (PRILOSEC) 40 mg capsule, Take 40 mg by mouth daily. , Disp: , Rfl:     famotidine (PEPCID) 40 mg tablet, Take 40 mg by mouth nightly., Disp: , Rfl:     cyclobenzaprine (FLEXERIL) 5 mg tablet, Take 5 mg by mouth as needed. , Disp: , Rfl:     traMADoL (ULTRAM) 50 mg tablet, Take 50 mg by mouth every eight (8) hours as needed. , Disp: , Rfl:     losartan (COZAAR) 100 mg tablet, Take 100 mg by mouth daily. , Disp: , Rfl:     fluticasone propionate (FLONASE) 50 mcg/actuation nasal spray, 2 Sprays by Both Nostrils route daily as needed. , Disp: , Rfl:     finasteride (PROSCAR) 5 mg tablet, Take 1 Tablet by mouth daily. (Patient taking differently: Take 5 mg by mouth Every morning.), Disp: 90 Tablet, Rfl: 4    LORazepam (ATIVAN) 1 mg tablet, Take 1 mg by mouth nightly., Disp: , Rfl:     tamsulosin (FLOMAX) 0.4 mg capsule, Take 1 Cap by mouth daily. , Disp: 90 Cap, Rfl: 4   Date Last Reviewed:  1/10/2022   Number of Personal Factors/Comorbidities that affect the Plan of Care: 1-2: MODERATE COMPLEXITY   EXAMINATION:   External Observation:   · Voluntary Contraction: present  · Voluntary Relaxation: present  · Involuntary Contraction: present  · Involuntary Relaxation: present      SEMG evaluation: Date 1/10/22  Position: side-lying, Electrode type: sEMG  Resting tone: <2uV  Quality of rest: [] irregular     [] elevated     [x] WNL  5- second hold: 40uV  10- second hold: 30*uV x 5 reps with consistent contraction.      Quality of:    Recruitment: [] slow     [] fair     [x] good   Derecruitment: [] slow     [] fair     [x] good   Holding: [] slow     [x] fair     [] good   Stability of hold: [] slow     [x] fair     [] good   Stability of rest: [] slow     [] fair     [x] good   Baseline b/t contract: [] slow     [] fair     [x] good   Overflow: [] absent     [x] min     [] mod     [] severe gluteals         Quick flicks well coordinated. Hip strength: To be assessed. External palpation:  Muscle/muscle group Tender? Adductors [] Right  [] Left  []N/T   Gluteals [] Right  [] Left  []N/T   Piriformis [] Right  [] Left  []N/T   Iliopsoas [] Right  [] Left  []N/T   Abdominal wall [x] Right  [x] Left  []N/T  Tension throughout abdominal wall   Pubic symphysis [] Right  [] Left  []N/T     Breath assessment:  Observation: chest breathing dominant and A/P chest expansion  Coordination of pelvic floor muscles with breath: coordination intact. Co-contraction of PFM with transversus abdominis: present         Body Structures Involved:  1. Nerves  2. Muscles Body Functions Affected:  1. Sensory/Pain  2. Genitourinary  3. Reproductive  4. Neuromusculoskeletal  5. Movement Related Activities and Participation Affected:  1. Mobility  2. Domestic Life  3.  Work life   Number of elements that affect the Plan of Care: 3: MODERATE COMPLEXITY   CLINICAL PRESENTATION:   Presentation: Evolving clinical presentation with changing clinical characteristics: MODERATE COMPLEXITY   CLINICAL DECISION MAKING:   Use of outcome tool(s) and clinical judgement create a POC that gives a: Questionable prediction of patient's progress: MODERATE COMPLEXITY       Yulia Block, PT, DPT, [facility]

## 2022-01-18 ENCOUNTER — APPOINTMENT (OUTPATIENT)
Dept: PHYSICAL THERAPY | Age: 67
End: 2022-01-18
Attending: UROLOGY
Payer: MEDICARE

## 2022-01-25 ENCOUNTER — HOSPITAL ENCOUNTER (OUTPATIENT)
Dept: PHYSICAL THERAPY | Age: 67
Discharge: HOME | End: 2022-01-25
Attending: UROLOGY
Payer: MEDICARE

## 2022-01-25 PROCEDURE — 97530 THERAPEUTIC ACTIVITIES: CPT

## 2022-01-25 PROCEDURE — 97110 THERAPEUTIC EXERCISES: CPT

## 2022-01-25 NOTE — PROGRESS NOTES
Severo Mason  : 1955  [place]  Secondary: Amos Bowers at [place] [address] [facility], [address] [place] [address],[address], [facility] [address].  [phone]   [phone]          OUTPATIENT PHYSICAL THERAPY: Daily Treatment Note 2022   Visit Count:  2   Lack of coordination (muscle incoordination) (R27.8), Stress incontinence (female) (male) (N39.3) and Lower abdominal pain, unspecified (R10.30)  Pre-treatment Symptoms/Complaints:  JEREMIAH    Pt does not report any problems with his current exercises. Denies abdominal or pelvic pain currently. Urinary frequency: every 30 minutes to 1 hour. Feels he can delay urges 15-20 minutes. Nocturia: ranges from 2x/night to 5-6x/night  Urinary leakage: JEREMIAH with ripping out carpet, lifting. Typically described as \"squirts\". Denies UI with transition from sit-to-stand. Fluid intake: working on staying hydrated. No hesitancy or issues with stream.     Pain: Initial:   0/10 Post Session:  0/10   Medications Last Reviewed:  2022  Updated Objective Findings:    sEMG biofeedback: PFM resting <1.0 mV. Max activation to 30 mV     TREATMENT:     THERAPEUTIC EXERCISE: (30 minutes):  Exercises per grid below to improve mobility, strength, and coordination. Required minimal visual and verbal cues to promote proper body mechanics and promote proper body breathing techniques. Progressed resistance, range, repetitions, and complexity of movement as indicated.      Date:  1/10/22 Date:  22 Date:     TE Parameters Parameters Parameters   HEP - kegal program 3x/day:  10 x 10 sec hold  3 x 5 quick flicks     Kegals Completed with sEMG biofeedback in session - side-lying Completed with sEMG biofeedback in session - side-lying    Coordinated with breathing, quick flicks, PF + TrA co-activation                                    THERAPEUTIC ACTIVITY: (25 minutes): Functional activity bladder health education on avoiding bladder irritants to reduce episodes of urge urinary incontinence and urgency/frequency. Patient was provided a list of common bladder irritants including caffeine, carbonation, alcohol, artificial sweeteners, chocolate, acidic drinks, and tomato based drinks. and Instruction on coordinated pelvic floor and diaphragmatic breathing to improve kinesthetic awareness of pelvic muscle mobility and restore proper motor recruitment patterns with breathing, posture, and functional movement (e.g. appropriate lift/contraction with increased IAP such as a cough, laugh, sneeze to prevent incontinence). TA Educational Topic Notes Date Completed   Pathology/Anatomy/PFM Function Completed 1/10/22   Bladder health education Completed 1/10/22   Urinary urge suppression Provided handout, education on delayed voiding as able with urge suppression 1/25/25   The yesica     Voiding strategies     Bowel/Bladder log     Bowel health education     Constipation care     Diarrhea/Fecal leakage     Colonic massage     Toilet positioning     Defecation dynamics     Sources of fiber     Return to intercourse/Dilator training     Sexual positioning     Lubricants/vaginal moisturizers     Vaginal irritants     Body mechanics Lifting -  Box from floor #30  Quadruped - pulls with resistance and lift (10lb) to mimic work pulling carpet and placing tile 1/25/25   Posture/ergonomics     Diaphragmatic breathing     Resources and technology       MANUAL THERAPY: ( minutes): Soft tissue mobilization was utilized and necessary because of the patient's restricted motion of soft tissue.    HELD TODAY    Date Type Location Comments   1/10/22 CTM Abdomina wall - inge-incisional      10 minutes                            (Used abbreviations: MET - muscle energy technique; SCS- Strain counter strain; CTM-Connective tissue mobilizations; CR- Contract/relax; SP- Sustained pressure, TrP-Trigger point release, IASTM- Instrument assisted soft tissue mobilizations, TDN-Trigger point dry needling)      Treatment/Session Summary:    · Response to Treatment:  Pt showed good coordination of pelvic floor contractions and pelvic bracing with TrA in side-lying. He demonstrated ability to perform pelvic bracing in quadruped and when lifting a box. He reported no leakage with proper muscle activation sequencing and cues for appropriate lifting mechanics. · Communication/Consultation:  None today  · Equipment provided today:  None today  · Variation from POC:  · Recommendations/Intent for next treatment session: Next visit will focus on urge suppression training, toilet positioning for bowel evacuation, review lifting mechanics, CTM through abdominal wall and gentle mobility.   Total Treatment Billable Duration: Treatment minutes [facility], PT    Visit # Therapist initials Date Arrived NS/ Cx < 24 hr >24 hr Cx Visit Comments   1 MT 1/10/22 Yes   Initial Assessment and Treatment   2 MT 1/25/22 Yes   Daily                                                                                                                                                      Abbreviations: NS = No Show; CX = cancelled     Future Appointments   Date Time Provider Jesus Benedict   1/25/2022  3:00 PM Sherrilee Beams, PT [facility]   2/1/2022  3:00 PM Sherrilee Beams, PT [facility] S[facility]   2/8/2022  3:00 PM Sherrilee Beams, PT [facility]   2/15/2022  3:00 PM Sherrilee Beams, PT [facility] SFE   2/22/2022  3:00 PM Sherrilee Beams, PT [facility] SFE   4/8/2022 10:20 AM [address] [place] [address] OUTREACH INSURANCE [facility] [address] [place] [address]   4/8/2022 10:45 AM Cedric Jordan MD [facility]

## 2022-02-01 ENCOUNTER — HOSPITAL ENCOUNTER (OUTPATIENT)
Dept: PHYSICAL THERAPY | Age: 67
Discharge: HOME | End: 2022-02-01
Attending: UROLOGY
Payer: MEDICARE

## 2022-02-01 PROCEDURE — 97530 THERAPEUTIC ACTIVITIES: CPT

## 2022-02-01 PROCEDURE — 97110 THERAPEUTIC EXERCISES: CPT

## 2022-02-01 NOTE — PROGRESS NOTES
Ana Arango  : 1955  [place]  Secondary: Amos Bowers at [address], [address] [place] [address],[address], [address]  [phone]   [phone]          OUTPATIENT PHYSICAL THERAPY: Daily Treatment Note 2022   Visit Count:  3   Lack of coordination (muscle incoordination) (R27.8), Stress incontinence (female) (male) (N39.3) and Lower abdominal pain, unspecified (R10.30)  Pre-treatment Symptoms/Complaints:  JEREMIAH    Pt with low grade back pain. Pt has been able to eliminate use of diapers at home. He is still using them when he is out in public. Seeing improvement in his symptoms of urinary incontinence. Pt does not report any problems with his current exercises. Denies abdominal or pelvic pain currently. Urinary frequency: Pt voiding every 1.5-2 hours. Nocturia: ranges from 2x/night to 5-6x/night. He wakes up with a strong urge to urinate. Eliminating fluids around 8:30pm. Volume is low. Urinary leakage: JEREMIAH with ripping out carpet, lifting. Typically described as \"squirts\". Denies UI with transition from sit-to-stand. He is practicing his breathing with lifting. Mild leakage remains. Fluid intake: Drinking mostly water. No hesitancy or issues with stream.     Denies abdominal pain. Pain: Initial:   0/10 Post Session:  0/10   Medications Last Reviewed:  2022  Updated Objective Findings:    sEMG biofeedback: PFM resting <1.0 mV. Max activation to 30 mV. Able to complete 10 x 10 sec hold. Quick flicks well coordinated. TREATMENT:     THERAPEUTIC EXERCISE: (45 minutes):  Exercises per grid below to improve mobility, strength, and coordination. Required minimal visual and verbal cues to promote proper body mechanics and promote proper body breathing techniques. Progressed resistance, range, repetitions, and complexity of movement as indicated.      Date:  1/10/22 Date:  22 Date:  2/2/22   TE Parameters Parameters Parameters   HEP - kegal program 3x/day:  10 x 10 sec hold  3 x 5 quick flicks Reviewed HEP    Kegals Completed with sEMG biofeedback in session - side-lying Completed with sEMG biofeedback in session - side-lying    Coordinated with breathing, quick flicks, PF + TrA, co-activation endurance holds x 10 sec x 10    Sit to stand with hip abduction  2 x 10, black TB    Step-up - front and lateral  X 20    Rows  2 x 15    Seated lumbar stretch  plyo-ball rolls x 10            THERAPEUTIC ACTIVITY: (10 minutes): Functional activity bladder health education on avoiding bladder irritants to reduce episodes of urge urinary incontinence and urgency/frequency. Patient was provided a list of common bladder irritants including caffeine, carbonation, alcohol, artificial sweeteners, chocolate, acidic drinks, and tomato based drinks. and Instruction on coordinated pelvic floor and diaphragmatic breathing to improve kinesthetic awareness of pelvic muscle mobility and restore proper motor recruitment patterns with breathing, posture, and functional movement (e.g. appropriate lift/contraction with increased IAP such as a cough, laugh, sneeze to prevent incontinence).      TA Educational Topic Notes Date Completed   Pathology/Anatomy/PFM Function Completed 1/10/22   Bladder health education Completed 1/10/22   Urinary urge suppression Provided handout, education on delayed voiding as able with urge suppression  Reviewed 1/25/25 2/1/22   The yesica     Voiding strategies     Bowel/Bladder log     Bowel health education     Constipation care     Diarrhea/Fecal leakage     Colonic massage     Toilet positioning     Defecation dynamics     Sources of fiber     Return to intercourse/Dilator training     Sexual positioning     Lubricants/vaginal moisturizers     Vaginal irritants     Body mechanics Lifting -  Box from floor #30  Quadruped - pulls with resistance and lift (10lb) to mimic work pulling carpet and placing tile 1/25/25   Posture/ergonomics     Diaphragmatic breathing     Resources and technology       MANUAL THERAPY: ( minutes): Soft tissue mobilization was utilized and necessary because of the patient's restricted motion of soft tissue. HELD TODAY    Date Type Location Comments   1/10/22 CTM Abdomina wall - inge-incisional      10 minutes                            (Used abbreviations: MET - muscle energy technique; SCS- Strain counter strain; CTM-Connective tissue mobilizations; CR- Contract/relax; SP- Sustained pressure, TrP-Trigger point release, IASTM- Instrument assisted soft tissue mobilizations, TDN-Trigger point dry needling)      Treatment/Session Summary:    · Response to Treatment:  Pt showing good coordination of his PFM with his breath in supine with completion of quick flicks, hold contractions, and PF/TrA co-contractions. He will continue to benefit from verbal cues to translate this coordination into functional movements including step-up, sit-to-stand, and lifting. · Communication/Consultation:  None today  · Equipment provided today:  None today  · Variation from POC:  · Recommendations/Intent for next treatment session: Next visit will focus on urge suppression training, toilet positioning for bowel evacuation, review lifting mechanics, CTM through abdominal wall and gentle mobility.   Total Treatment Billable Duration: Treatment minutes 55  PT Patient Time In/Time Out  Time In: 0300  Time Out: 101 [facility], PT    Visit # Therapist initials Date Arrived NS/ Cx < 24 hr >24 hr Cx Visit Comments   1 MT 1/10/22 Yes   Initial Assessment and Treatment   2 MT 1/25/22 Yes   Daily    3 MT 2/1/22 Yes   Daily                                                                                                                                             Abbreviations: NS = No Show; CX = cancelled     Future Appointments   Date Time Provider Jesus Benedict   2/8/2022  3:00 PM Selby Carrel, PT [facility]   2/15/2022  3:00 PM Mario Martins, PT [facility] SFE   2/22/2022  3:00 PM Mario Martins, PT [facility] SFE   4/8/2022 10:20 AM [facility] [employer]ACH INSURANCE [facility]   4/8/2022 10:45 AM Frank Talley MD [facility]

## 2022-02-02 ENCOUNTER — APPOINTMENT (RX ONLY)
Dept: URBAN - METROPOLITAN AREA CLINIC 23 | Facility: CLINIC | Age: 67
Setting detail: DERMATOLOGY
End: 2022-02-02

## 2022-02-02 DIAGNOSIS — L82.1 OTHER SEBORRHEIC KERATOSIS: ICD-10-CM

## 2022-02-02 DIAGNOSIS — Z85.828 PERSONAL HISTORY OF OTHER MALIGNANT NEOPLASM OF SKIN: ICD-10-CM

## 2022-02-02 DIAGNOSIS — L57.8 OTHER SKIN CHANGES DUE TO CHRONIC EXPOSURE TO NONIONIZING RADIATION: ICD-10-CM

## 2022-02-02 DIAGNOSIS — D485 NEOPLASM OF UNCERTAIN BEHAVIOR OF SKIN: ICD-10-CM

## 2022-02-02 PROBLEM — D48.5 NEOPLASM OF UNCERTAIN BEHAVIOR OF SKIN: Status: ACTIVE | Noted: 2022-02-02

## 2022-02-02 PROCEDURE — 11102 TANGNTL BX SKIN SINGLE LES: CPT

## 2022-02-02 PROCEDURE — ? COUNSELING

## 2022-02-02 PROCEDURE — 99213 OFFICE O/P EST LOW 20 MIN: CPT | Mod: 25

## 2022-02-02 PROCEDURE — ? BIOPSY BY SHAVE METHOD

## 2022-02-02 ASSESSMENT — LOCATION DETAILED DESCRIPTION DERM
LOCATION DETAILED: RIGHT CLAVICULAR SKIN
LOCATION DETAILED: RIGHT MEDIAL TRAPEZIAL NECK
LOCATION DETAILED: LEFT MEDIAL SUPERIOR CHEST
LOCATION DETAILED: LEFT PROXIMAL DORSAL FOREARM
LOCATION DETAILED: NASAL DORSUM
LOCATION DETAILED: RIGHT RIB CAGE
LOCATION DETAILED: RIGHT PROXIMAL DORSAL FOREARM

## 2022-02-02 ASSESSMENT — LOCATION ZONE DERM
LOCATION ZONE: ARM
LOCATION ZONE: NOSE
LOCATION ZONE: NECK
LOCATION ZONE: TRUNK

## 2022-02-02 ASSESSMENT — LOCATION SIMPLE DESCRIPTION DERM
LOCATION SIMPLE: NOSE
LOCATION SIMPLE: RIGHT FOREARM
LOCATION SIMPLE: CHEST
LOCATION SIMPLE: POSTERIOR NECK
LOCATION SIMPLE: RIGHT CLAVICULAR SKIN
LOCATION SIMPLE: ABDOMEN
LOCATION SIMPLE: LEFT FOREARM

## 2022-02-02 NOTE — PROCEDURE: BIOPSY BY SHAVE METHOD
Size Of Lesion In Cm: 0
Detail Level: Detailed
Billing Type: Third-Party Bill
Validate Anticipated Plan: No
Consent: Written consent was obtained and risks were reviewed including but not limited to scarring, infection, bleeding, scabbing, incomplete removal, and allergy to anesthesia.
Notification Instructions: Patient will be notified of biopsy results. However, patient instructed to call the office if not contacted within 2 weeks.
Silver Nitrate Text: The wound bed was treated with silver nitrate after the biopsy was performed.
Post-care instructions were reviewed in detail and written instructions are provided. Patient is to keep the biopsy site dry overnight, and then apply bacitracin twice daily until healed. Patient may apply hydrogen peroxide soaks to remove any crusting.
Electrodesiccation And Curettage Text: The wound bed was treated with electrodesiccation and curettage after the biopsy was performed.
Was A Bandage Applied: Yes
Path Notes (To The Dermatopathologist): MOHs if positive
Anesthesia Type: 1% lidocaine with epinephrine
Biopsy Method: Dermablade
Dressing: bandage
Wound Care: Vaseline
Biopsy Type: H and E
Electrodesiccation Text: The wound bed was treated with electrodesiccation after the biopsy was performed.
Accession #: [id number]
Type Of Destruction Used: Curettage
Anesthesia Volume In Cc: 0.3
Cryotherapy Text: The wound bed was treated with cryotherapy after the biopsy was performed.
Hemostasis: Aluminum Chloride
Information: Selecting Yes will display possible errors in your note based on the variables you have selected. This validation is only offered as a suggestion for you. PLEASE NOTE THAT THE VALIDATION TEXT WILL BE REMOVED WHEN YOU FINALIZE YOUR NOTE. IF YOU WANT TO FAX A PRELIMINARY NOTE YOU WILL NEED TO TOGGLE THIS TO 'NO' IF YOU DO NOT WANT IT IN YOUR FAXED NOTE.
Depth Of Biopsy: dermis

## 2022-02-08 ENCOUNTER — HOSPITAL ENCOUNTER (OUTPATIENT)
Dept: PHYSICAL THERAPY | Age: 67
Discharge: HOME | End: 2022-02-08
Attending: UROLOGY
Payer: MEDICARE

## 2022-02-08 NOTE — PROGRESS NOTES
Nicole Ramirez  : 1955  [place]  Secondary: Amos Carter 75 at [place] [id number] [facility], [address] [place] [address],[address], [place] [address].  [phone]   [phone]          OUTPATIENT DAILY NOTE    NAME/AGE/GENDER: Nicole Ramirez is a 73 y.o. male. DATE: 2022    Patient cancelled (less than 24 hours ago) his appointment for today due to transportation issues. Will plan to follow up on next scheduled visit.     Fadi Salcedo PT    Future Appointments   Date Time Provider Jesus Benedict   2022  3:00 PM Zen Ramsay [facility] SFPEDRO   2/15/2022  3:00 PM [facility]   2022  3:00 PM Delma Dominguez PT [facility] SFE   2022 10:20 AM [facility] OUTREACH INSURANCE [facility]   2022 10:45 AM Christa French MD [facility]

## 2022-02-15 ENCOUNTER — HOSPITAL ENCOUNTER (OUTPATIENT)
Dept: PHYSICAL THERAPY | Age: 67
Discharge: HOME | End: 2022-02-15
Attending: UROLOGY
Payer: MEDICARE

## 2022-02-15 PROCEDURE — 97110 THERAPEUTIC EXERCISES: CPT

## 2022-02-15 NOTE — PROGRESS NOTES
Jennifer Neville  : 1955  [place]  Secondary: Amos Bowers at [place] [id number] [facility], [address] [place] [address],[address], [facility] [address].  [phone]   [phone]          OUTPATIENT PHYSICAL THERAPY: Daily Treatment Note 2/15/2022   Visit Count:  4   Lack of coordination (muscle incoordination) (R27.8), Stress incontinence (female) (male) (N39.3) and Lower abdominal pain, unspecified (R10.30)  Pre-treatment Symptoms/Complaints:  JEREMIAH  Pt no longer using diapers. He uses 1PPD. He is having more dry days. Continues to have LBP which is limiting his ability to do his job. Reports he had to stop working today at 12:30pm.     JEREMIAH with yawning and couching    Denies abdominal pain. Pain: Initial:   0/10 Post Session:  0/10   Medications Last Reviewed:  2/15/2022  Updated Objective Findings:    sEMG biofeedback: PFM resting <1.0 mV. Max activation to 30 mV. Able to complete 10 x 10 sec hold. Quick flicks well coordinated. TREATMENT:     THERAPEUTIC EXERCISE: (55 minutes):  Exercises per grid below to improve mobility, strength, and coordination. Required minimal visual and verbal cues to promote proper body mechanics and promote proper body breathing techniques. Progressed resistance, range, repetitions, and complexity of movement as indicated.      Date:  1/10/22 Date:  22 Date:  2/15/22   TE Parameters Parameters Parameters   HEP - kegal program 3x/day:  10 x 10 sec hold  3 x 5 quick flicks Reviewed HEP Reviewed HEP, updated with lumbo-pelvic mobility   Kegals Completed with sEMG biofeedback in session - side-lying Completed with sEMG biofeedback in session - side-lying    Coordinated with breathing, quick flicks, PF + TrA, co-activation endurance holds x 10 sec x 10 Completed with sEMG biofeedback in session - side-lying    Coordinated with breathing, quick flicks, PF + TrA, co-activation endurance holds x 10 sec x 10, progressive contractions   Sit to stand with hip abduction  2 x 10, black TB    Step-up - front and lateral  X 20    Rows  2 x 15 2 x 15   Seated lumbar stretch  plyo-ball rolls x 10    PPT + hip adduction   X 10   Single knee to chest   3 x 30 sec   Lower trunk rotation   x5   Supine march   2 x 10     Access Code: [email or web address]  URL: [email or web address]  Date: 02/15/2022  Prepared by: [facility]    Exercises  Supine Posterior Pelvic Tilt with Pelvic Floor Contraction - 7 x weekly - 10 reps  Hooklying Single Knee to Chest Stretch - 1 x daily - 7 x weekly - 3 reps - 1 min hold  Supine Lower Trunk Rotation - 1 x daily - 7 x weekly - 4-5 reps - 30 sec hold  Supine March - 1 x daily - 7 x weekly - 2 sets - 10 reps      THERAPEUTIC ACTIVITY: ( minutes): Functional activity bladder health education on avoiding bladder irritants to reduce episodes of urge urinary incontinence and urgency/frequency. Patient was provided a list of common bladder irritants including caffeine, carbonation, alcohol, artificial sweeteners, chocolate, acidic drinks, and tomato based drinks. and Instruction on coordinated pelvic floor and diaphragmatic breathing to improve kinesthetic awareness of pelvic muscle mobility and restore proper motor recruitment patterns with breathing, posture, and functional movement (e.g. appropriate lift/contraction with increased IAP such as a cough, laugh, sneeze to prevent incontinence).      TA Educational Topic Notes Date Completed   Pathology/Anatomy/PFM Function Completed 1/10/22   Bladder health education Completed 1/10/22   Urinary urge suppression Provided handout, education on delayed voiding as able with urge suppression  Reviewed 1/25/25 2/1/22   The yesica     Voiding strategies     Bowel/Bladder log     Bowel health education     Constipation care     Diarrhea/Fecal leakage     Colonic massage     Toilet positioning     Defecation dynamics     Sources of fiber     Return to intercourse/Dilator training     Sexual positioning     Lubricants/vaginal moisturizers     Vaginal irritants     Body mechanics Lifting -  Box from floor #30  Quadruped - pulls with resistance and lift (10lb) to mimic work pulling carpet and placing tile 1/25/25   Posture/ergonomics     Diaphragmatic breathing     Resources and technology       MANUAL THERAPY: ( minutes): Soft tissue mobilization was utilized and necessary because of the patient's restricted motion of soft tissue. HELD TODAY    Date Type Location Comments   1/10/22 CTM Abdomina wall - inge-incisional      10 minutes                            (Used abbreviations: MET - muscle energy technique; SCS- Strain counter strain; CTM-Connective tissue mobilizations; CR- Contract/relax; SP- Sustained pressure, TrP-Trigger point release, IASTM- Instrument assisted soft tissue mobilizations, TDN-Trigger point dry needling)      Treatment/Session Summary:    · Response to Treatment:  Hypertrophy of lumbar paraspinals noted. Poor ability to activate TrA without breath holding during supine based exercises. Showing good coordination of his PFM in side-lying. He has reduced pad usage and is progressing well. He will continue to benefit from treatment of his LBP.   · Communication/Consultation:  None today  · Equipment provided today:  None today  · Variation from POC:  · Recommendations/Intent for next treatment session: Next visit will focus on lumbo-pelvic mobility, TrA progression, progress PFM training to standing  Total Treatment Billable Duration: Treatment minutes 269 [facility], PT    Visit # Therapist initials Date Arrived NS/ Cx < 24 hr >24 hr Cx Visit Comments   1 MT 1/10/22 Yes   Initial Assessment and Treatment   2 MT 1/25/22 Yes   Daily    3 MT 2/1/22 Yes   Daily     MT   CX  Due to car trouble   4 MT 2/15/22    Daily Abbreviations: NS = No Show; CX = cancelled     Future Appointments   Date Time Provider Jesus Benedict   2/22/2022  3:00 PM Lisette Dodge, [facility]   4/8/2022 10:20 AM [address] [place] [address] OUTREACH INSURANCE [address]   4/8/2022 10:45 AM Kunal Gandhi MD [facility]

## 2022-02-22 ENCOUNTER — HOSPITAL ENCOUNTER (OUTPATIENT)
Dept: PHYSICAL THERAPY | Age: 67
Discharge: HOME | End: 2022-02-22
Attending: UROLOGY
Payer: MEDICARE

## 2022-02-22 PROCEDURE — 97140 MANUAL THERAPY 1/> REGIONS: CPT

## 2022-02-22 PROCEDURE — 97110 THERAPEUTIC EXERCISES: CPT

## 2022-02-22 NOTE — PROGRESS NOTES
Sherren Patee  : 1955  [place]  Secondary: Amos Carter 75 at [address] [facility], [address] [place] [address],[address], [address] [facility]  [phone]   [phone]          OUTPATIENT PHYSICAL THERAPY: Daily Treatment Note 2022   Visit Count:  5   Lack of coordination (muscle incoordination) (R27.8), Stress incontinence (female) (male) (N39.3) and Lower abdominal pain, unspecified (R10.30)  Pre-treatment Symptoms/Complaints:  JEREMIAH  Experiencing less UI. Mostly occurring with exertion, ripping nereida out for job. Pt no longer using diapers. He uses 1PPD. He is having more dry days. Pt states it has been challenging to get in his low back exercises during the day. He is very sore at the end of his work day. Denies abdominal pain. Pain: Initial: Pain Intensity 1: 3 10 Post Session:  3/10   Medications Last Reviewed:  2022  Updated Objective Findings:    sEMG biofeedback: PFM resting <1.0 mV. Max activation to 30 mV. Able to complete 10 x 10 sec hold. Quick flicks well coordinated - supine and standing     TREATMENT:     THERAPEUTIC EXERCISE: (45 minutes):  Exercises per grid below to improve mobility, strength, and coordination. Required minimal visual and verbal cues to promote proper body mechanics and promote proper body breathing techniques. Progressed resistance, range, repetitions, and complexity of movement as indicated.      Date:  1/10/22 Date:  22 Date:  2/15/22 Date:  22   TE Parameters Parameters Parameters Parameters   HEP - kegal program 3x/day:  10 x 10 sec hold  3 x 5 quick flicks Reviewed HEP Reviewed HEP, updated with lumbo-pelvic mobility Reviewed   Kegals Completed with sEMG biofeedback in session - side-lying Completed with sEMG biofeedback in session - side-lying    Coordinated with breathing, quick flicks, PF + TrA, co-activation endurance holds x 10 sec x 10 Completed with sEMG biofeedback in session - side-lying    Coordinated with breathing, quick flicks, PF + TrA, co-activation endurance holds x 10 sec x 10, progressive contractions Completed with sEMG biofeedback in session - side-lying and standing    Coordinated with breathing, quick flicks, PF + TrA, co-activation endurance holds x 10 sec x 10,    Sit to stand with hip abduction  2 x 10, black TB     Step-up - front and lateral  X 20     Rows  2 x 15 2 x 15    Seated lumbar stretch  plyo-ball rolls x 10  yusuf pose lumbar stretch   PPT + hip adduction   X 10    Single knee to chest   3 x 30 sec 3 x 30 sec   Lower trunk rotation   x5 X 20   Supine march   2 x 10    Cat/Cow    X 10   Piriformis stretch    3 x 30 sec     Access Code: [email or web address]  URL: [email or web address]  Date: 02/15/2022  Prepared by: [facility]    Exercises  Supine Posterior Pelvic Tilt with Pelvic Floor Contraction - 7 x weekly - 10 reps  Hooklying Single Knee to Chest Stretch - 1 x daily - 7 x weekly - 3 reps - 1 min hold  Supine Lower Trunk Rotation - 1 x daily - 7 x weekly - 4-5 reps - 30 sec hold  Supine March - 1 x daily - 7 x weekly - 2 sets - 10 reps      THERAPEUTIC ACTIVITY: ( minutes): Functional activity bladder health education on avoiding bladder irritants to reduce episodes of urge urinary incontinence and urgency/frequency. Patient was provided a list of common bladder irritants including caffeine, carbonation, alcohol, artificial sweeteners, chocolate, acidic drinks, and tomato based drinks. and Instruction on coordinated pelvic floor and diaphragmatic breathing to improve kinesthetic awareness of pelvic muscle mobility and restore proper motor recruitment patterns with breathing, posture, and functional movement (e.g. appropriate lift/contraction with increased IAP such as a cough, laugh, sneeze to prevent incontinence).      TA Educational Topic Notes Date Completed   Pathology/Anatomy/PFM Function Completed 1/10/22   Bladder health education Completed 1/10/22   Urinary urge suppression Provided handout, education on delayed voiding as able with urge suppression  Reviewed 1/25/25 2/1/22   The yesica     Voiding strategies     Bowel/Bladder log     Bowel health education     Constipation care     Diarrhea/Fecal leakage     Colonic massage     Toilet positioning     Defecation dynamics     Sources of fiber     Return to intercourse/Dilator training     Sexual positioning     Lubricants/vaginal moisturizers     Vaginal irritants     Body mechanics Lifting -  Box from floor #30  Quadruped - pulls with resistance and lift (10lb) to mimic work pulling carpet and placing tile 1/25/25   Posture/ergonomics     Diaphragmatic breathing     Resources and technology       MANUAL THERAPY: (10 minutes): Soft tissue mobilization was utilized and necessary because of the patient's restricted motion of soft tissue. Date Type Location Comments   1/10/22 CTM Abdomina wall - inge-incisional      10 minutes CTM to Mid to low back paraspinals                           (Used abbreviations: MET - muscle energy technique; SCS- Strain counter strain; CTM-Connective tissue mobilizations; CR- Contract/relax; SP- Sustained pressure, TrP-Trigger point release, IASTM- Instrument assisted soft tissue mobilizations, TDN-Trigger point dry needling)      Treatment/Session Summary:    · Response to Treatment: Pt continues to progress well with PFM strengthening, demonstrating good endurance and coordination of PFM in side-lying and standing. He does have increased tension through bilateral paraspinal musculature limiting his mobility at times. Continues to demonstrate breath holding with transitional movements.    · Communication/Consultation:  None today  · Equipment provided today:  None today  · Variation from POC:  · Recommendations/Intent for next treatment session: Next visit will focus on lumbo-pelvic mobility, TrA progression, progress PFM training standing, review STS with PFM coordination.    Total Treatment Billable Duration: Treatment minutes 55  PT Patient Time In/Time Out  Time In: 0300  Time Out: 0400  [facility], PT    Visit # Therapist initials Date Arrived NS/ Cx < 24 hr >24 hr Cx Visit Comments   1 MT 1/10/22 Yes   Initial Assessment and Treatment   2 MT 1/25/22 Yes   Daily    3 MT 2/1/22 Yes   Daily     MT   CX  Due to car trouble   4 MT 2/15/22    Daily    5 MT 2/22/22 Yes   Daily                                                                                                                  Abbreviations: NS = No Show; CX = cancelled     Future Appointments   Date Time Provider Jesus Benedict   3/8/2022  3:00 PM [facility]   3/15/2022  3:00 PM Cricket Crump, PT [facility]   4/8/2022 10:20 AM [facility] OUTREACH INSURANCE [facility]   4/8/2022 10:45 AM Nuris Barros MD [facility]

## 2022-03-04 ENCOUNTER — HOSPITAL ENCOUNTER (OUTPATIENT)
Dept: PHYSICAL THERAPY | Age: 67
Discharge: HOME | End: 2022-03-04
Attending: UROLOGY
Payer: MEDICARE

## 2022-03-04 PROCEDURE — 97110 THERAPEUTIC EXERCISES: CPT

## 2022-03-04 NOTE — PROGRESS NOTES
Cj Matson  : 1955  [place]  Secondary: Amos Bowers at [place] [address] [facility], [address] [place] [address],[address] [facility], [place] [address].  [phone]   [phone]          OUTPATIENT PHYSICAL THERAPY: Daily Treatment Note 3/4/2022   Visit Count:  6   Lack of coordination (muscle incoordination) (R27.8), Stress incontinence (female) (male) (N39.3) and Lower abdominal pain, unspecified (R10.30)  Pre-treatment Symptoms/Complaints:  JEREMIAH  Continues to experience less UI and has had better days with less back pain. Pt feels every week has gotten better   Improved ability to get in/out of bed    Pain: Initial:   0/10 Post Session:  0/10   Medications Last Reviewed:  3/4/2022  Updated Objective Findings:    sEMG biofeedback: PFM resting <1.0 mV. 30 mV at 5 sec hold, 20 mV at 10 sec hold x 15 reps     TREATMENT:     THERAPEUTIC EXERCISE: (55 minutes):  Exercises per grid below to improve mobility, strength, and coordination. Required minimal visual and verbal cues to promote proper body mechanics and promote proper body breathing techniques. Progressed resistance, range, repetitions, and complexity of movement as indicated.      Date:  1/10/22 Date:  22 Date:  2/15/22 Date:  22 Date:  3/4/22   TE Parameters Parameters Parameters Parameters Parameters   HEP - kegal program 3x/day:  10 x 10 sec hold  3 x 5 quick flicks Reviewed HEP Reviewed HEP, updated with lumbo-pelvic mobility Reviewed Reviewed    Kegals Completed with sEMG biofeedback in session - side-lying Completed with sEMG biofeedback in session - side-lying    Coordinated with breathing, quick flicks, PF + TrA, co-activation endurance holds x 10 sec x 10 Completed with sEMG biofeedback in session - side-lying    Coordinated with breathing, quick flicks, PF + TrA, co-activation endurance holds x 10 sec x 10, progressive contractions Completed with sEMG biofeedback in session - side-lying and standing    Coordinated with breathing, quick flicks, PF + TrA, co-activation endurance holds x 10 sec x 10,  Completed with sEMG biofeedback in session - side-lying     Coordinated with breathing, quick flicks, PF + TrA, co-activation endurance holds x 15 sec x 10, 15 x 5 sec holds, x 10 progressive contractions   Sit to stand with hip abduction  2 x 10, black TB      Step-up - front and lateral  X 20      Rows  2 x 15 2 x 15  2 x 15    Seated lumbar stretch  plyo-ball rolls x 10  yusuf pose lumbar stretch    PPT + hip adduction   X 10     Single knee to chest   3 x 30 sec 3 x 30 sec 3 x 30 sec   Lower trunk rotation   x5 X 20 X 20   Supine march   2 x 10   1 x 10    Cat/Cow    X 10    Bridge     X 15 + hip adduction/ball squeeze   Piriformis stretch    3 x 30 sec      Access Code: [email or web address]  URL: [email or web address]  Date: 02/15/2022  Prepared by: [facility] OF [facility]    Exercises  Supine Posterior Pelvic Tilt with Pelvic Floor Contraction - 7 x weekly - 10 reps  Hooklying Single Knee to Chest Stretch - 1 x daily - 7 x weekly - 3 reps - 1 min hold  Supine Lower Trunk Rotation - 1 x daily - 7 x weekly - 4-5 reps - 30 sec hold  Supine March - 1 x daily - 7 x weekly - 2 sets - 10 reps      THERAPEUTIC ACTIVITY: ( minutes): Functional activity bladder health education on avoiding bladder irritants to reduce episodes of urge urinary incontinence and urgency/frequency. Patient was provided a list of common bladder irritants including caffeine, carbonation, alcohol, artificial sweeteners, chocolate, acidic drinks, and tomato based drinks. and Instruction on coordinated pelvic floor and diaphragmatic breathing to improve kinesthetic awareness of pelvic muscle mobility and restore proper motor recruitment patterns with breathing, posture, and functional movement (e.g. appropriate lift/contraction with increased IAP such as a cough, laugh, sneeze to prevent incontinence).      TA Educational Topic Notes Date Completed   Pathology/Anatomy/PFM Function Completed 1/10/22   Bladder health education Completed 1/10/22   Urinary urge suppression Provided handout, education on delayed voiding as able with urge suppression  Reviewed 1/25/25 2/1/22   The yesica     Voiding strategies     Bowel/Bladder log     Bowel health education     Constipation care     Diarrhea/Fecal leakage     Colonic massage     Toilet positioning     Defecation dynamics     Sources of fiber     Return to intercourse/Dilator training     Sexual positioning     Lubricants/vaginal moisturizers     Vaginal irritants     Body mechanics Lifting -  Box from floor #30  Quadruped - pulls with resistance and lift (10lb) to mimic work pulling carpet and placing tile 1/25/25   Posture/ergonomics     Diaphragmatic breathing     Resources and technology       MANUAL THERAPY: (10 minutes): Soft tissue mobilization was utilized and necessary because of the patient's restricted motion of soft tissue. Date Type Location Comments   1/10/22 CTM Abdomina wall - inge-incisional      10 minutes CTM to Mid to low back paraspinals                           (Used abbreviations: MET - muscle energy technique; SCS- Strain counter strain; CTM-Connective tissue mobilizations; CR- Contract/relax; SP- Sustained pressure, TrP-Trigger point release, IASTM- Instrument assisted soft tissue mobilizations, TDN-Trigger point dry needling)      Treatment/Session Summary:    · Response to Treatment: Pt continues to progress well with PFM strengthening, demonstrating good endurance and coordination of PFM.    · Communication/Consultation:  None today  · Equipment provided today:  None today  · Variation from POC:  · Recommendations/Intent for next treatment session: Next visit will focus on lumbo-pelvic mobility, TrA progression, progress PFM training standing  Total Treatment Billable Duration: Treatment minutes 269 [facility], PT    Visit # Therapist initials Date Arrived NS/ Cx < 24 hr >24 hr Cx Visit Comments   1 MT 1/10/22 Yes   Initial Assessment and Treatment   2 MT 1/25/22 Yes   Daily    3 MT 2/1/22 Yes   Daily     MT   CX  Due to car trouble   4 MT 2/15/22    Daily    5 MT 2/22/22 Yes   Daily    6 MT 3/4/22 Yes   Daily                                                                                                         Abbreviations: NS = No Show; CX = cancelled     Future Appointments   Date Time Provider Jesus Benedict   3/4/2022  8:00 AM Jeanna Moon PT [facility]   3/15/2022  3:00 PM Jeanna Moon PT [facility]   4/8/2022 10:20 AM [facility] OUTREACH INSURANCE [facility]   4/8/2022 10:45 AM Bobby Be MD [facility]

## 2022-03-15 ENCOUNTER — HOSPITAL ENCOUNTER (OUTPATIENT)
Dept: PHYSICAL THERAPY | Age: 67
Discharge: HOME | End: 2022-03-15
Attending: UROLOGY
Payer: MEDICARE

## 2022-03-15 PROCEDURE — 97110 THERAPEUTIC EXERCISES: CPT

## 2022-03-15 NOTE — PROGRESS NOTES
Kaylynn Nando  : 1955  [place]  Secondary: Amos Bowers at [place] [id number] [facility], [address] [place] [address],[address], [facility] [address]  [phone]   [phone]          OUTPATIENT PHYSICAL THERAPY: Daily Treatment Note 3/15/2022   Visit Count:  7   Lack of coordination (muscle incoordination) (R27.8), Stress incontinence (female) (male) (N39.3) and Lower abdominal pain, unspecified (R10.30)  Pre-treatment Symptoms/Complaints:  JEREMIAH    Pt states he is very stiff in his back and rates his pain as 6/10 due to long work days on a new project. Stretching in the morning. Urinary leakage is much less. Reduced urinary frequency from 10x/night to 2x/night. Pain: Initial:   0/10 Post Session:  0/10   Medications Last Reviewed:  3/15/2022  Updated Objective Findings:    sEMG biofeedback:   PFM resting <1.0 mV   20 mV at 10 sec hold x 20 reps  Quick flicks to >75 mV     TREATMENT:     THERAPEUTIC EXERCISE: (55 minutes):  Exercises per grid below to improve mobility, strength, and coordination. Required minimal visual and verbal cues to promote proper body mechanics and promote proper body breathing techniques. Progressed resistance, range, repetitions, and complexity of movement as indicated.      Date:  1/10/22 Date:  22 Date:  2/15/22 Date:  22 Date:  3/4/22 Date:  3/15/22   TE Parameters Parameters Parameters Parameters Parameters Parameters   HEP - kegal program 3x/day:  10 x 10 sec hold  3 x 5 quick flicks Reviewed HEP Reviewed HEP, updated with lumbo-pelvic mobility Reviewed Reviewed  Reviewed   Kegals Completed with sEMG biofeedback in session - side-lying Completed with sEMG biofeedback in session - side-lying    Coordinated with breathing, quick flicks, PF + TrA, co-activation endurance holds x 10 sec x 10 Completed with sEMG biofeedback in session - side-lying    Coordinated with breathing, quick flicks, PF + TrA, co-activation endurance holds x 10 sec x 10, progressive contractions Completed with sEMG biofeedback in session - side-lying and standing    Coordinated with breathing, quick flicks, PF + TrA, co-activation endurance holds x 10 sec x 10,  Completed with sEMG biofeedback in session - side-lying     Coordinated with breathing, quick flicks, PF + TrA, co-activation endurance holds x 15 sec x 10, 15 x 5 sec holds, x 10 progressive contractions Completed with sEMG biofeedback in session - side-lying     Coordinated with breathing, quick flicks, PF + TrA, co-activation endurance holds x 10 sec hold, 20 reps  Progressive, quick flicks x 30, descending contractions. Sit to stand with hip abduction  2 x 10, black TB       Step-up - front and lateral  X 20       Rows  2 x 15 2 x 15  2 x 15     Seated lumbar stretch  plyo-ball rolls x 10  yusuf pose lumbar stretch     PPT + hip adduction   X 10      Single knee to chest   3 x 30 sec 3 x 30 sec 3 x 30 sec 3 x 30 sec   Lower trunk rotation   x5 X 20 X 20 X 20    Supine march   2 x 10   1 x 10  2 x 10   Cat/Cow    X 10  X 10 with tactile cues   Bridge     X 15 + hip adduction/ball squeeze X 20 + hip adduction/ball squeeze   Piriformis stretch    3 x 30 sec  3 x 30 sec   Bird dog      X 10 x 5 sec   Hamstring stretch      3 x 30 sec     Access Code: [email or web address]  URL: [email or web address]  Date: 02/15/2022  Prepared by: [facility] [place] [facility]    Exercises  Supine Posterior Pelvic Tilt with Pelvic Floor Contraction - 7 x weekly - 10 reps  Hooklying Single Knee to Chest Stretch - 1 x daily - 7 x weekly - 3 reps - 1 min hold  Supine Lower Trunk Rotation - 1 x daily - 7 x weekly - 4-5 reps - 30 sec hold  Supine March - 1 x daily - 7 x weekly - 2 sets - 10 reps    THERAPEUTIC ACTIVITY: ( minutes): Functional activity bladder health education on avoiding bladder irritants to reduce episodes of urge urinary incontinence and urgency/frequency.  Patient was provided a list of common bladder irritants including caffeine, carbonation, alcohol, artificial sweeteners, chocolate, acidic drinks, and tomato based drinks. and Instruction on coordinated pelvic floor and diaphragmatic breathing to improve kinesthetic awareness of pelvic muscle mobility and restore proper motor recruitment patterns with breathing, posture, and functional movement (e.g. appropriate lift/contraction with increased IAP such as a cough, laugh, sneeze to prevent incontinence). TA Educational Topic Notes Date Completed   Pathology/Anatomy/PFM Function Completed 1/10/22   Bladder health education Completed 1/10/22   Urinary urge suppression Provided handout, education on delayed voiding as able with urge suppression  Reviewed 1/25/25 2/1/22   The yesica     Voiding strategies     Bowel/Bladder log     Bowel health education     Constipation care     Diarrhea/Fecal leakage     Colonic massage     Toilet positioning     Defecation dynamics     Sources of fiber     Return to intercourse/Dilator training     Sexual positioning     Lubricants/vaginal moisturizers     Vaginal irritants     Body mechanics Lifting -  Box from floor #30  Quadruped - pulls with resistance and lift (10lb) to mimic work pulling carpet and placing tile 1/25/25   Posture/ergonomics     Diaphragmatic breathing     Resources and technology       MANUAL THERAPY: ( minutes): Soft tissue mobilization was utilized and necessary because of the patient's restricted motion of soft tissue.        Date Type Location Comments   1/10/22 CTM Abdomina wall - inge-incisional      10 minutes CTM to Mid to low back paraspinals                           (Used abbreviations: MET - muscle energy technique; SCS- Strain counter strain; CTM-Connective tissue mobilizations; CR- Contract/relax; SP- Sustained pressure, TrP-Trigger point release, IASTM- Instrument assisted soft tissue mobilizations, TDN-Trigger point dry needling)      Treatment/Session Summary:    · Response to Treatment: See Re-Certification.   · Communication/Consultation:  None today  · Equipment provided today:  None today  · Variation from POC:  · Recommendations/Intent for next treatment session: Next visit will focus on lumbo-pelvic mobility, TrA progression - isometric, progress PFM training standing  Total Treatment Billable Duration: Treatment minutes [facility], PT    Visit # Therapist initials Date Arrived NS/ Cx < 24 hr >24 hr Cx Visit Comments   1 MT 1/10/22 Yes   Initial Assessment and Treatment   2 MT 1/25/22 Yes   Daily    3 MT 2/1/22 Yes   Daily     MT   CX  Due to car trouble   4 MT 2/15/22    Daily    5 MT 2/22/22 Yes   Daily    6 MT 3/4/22 Yes   Daily    7 MT 3/15/22 Yes   Daily                                                                                                Abbreviations: NS = No Show; CX = cancelled     Future Appointments   Date Time Provider Jesus Benedict   4/8/2022 10:20 AM Neto [address] [facility]   4/8/2022 10:45 AM Bossman Allison MD [facility]

## 2022-03-15 NOTE — THERAPY RECERTIFICATION
Sherren Patee  : 1955  [place]  Secondary: Amos Bowers at [place] [id number] [facility], [address] [place] [address],[address], [facility] [address].  [phone]   [phone]        OUTPATIENT PHYSICAL THERAPY:Re-evaluation and Recertification     ICD-10: Treatment Diagnosis: Lack of coordination (muscle incoordination) (R27.8), Stress incontinence (female) (male) (N39.3), Low back pain (M54.5 ) and Lower abdominal pain, unspecified (R10.30)  Precautions/Allergies:   Monosodium glutamate, Other medication, and Venom-yellow jacket   TREATMENT PLAN:  Effective Dates: 3/06/22/ TO 5/15/2022 (60 days). Frequency/Duration: 1x/week for 60 days MEDICAL/REFERRING DIAGNOSIS:  Malignant neoplasm of prostate [C61]   DATE OF ONSET: December   REFERRING PHYSICIAN: Servando Sexton MD MD Orders: evaluate and treat  Return MD Appointment: March     REASSESSMENT: Mr. Latricia Zhou has been seen in skilled PT from 1/10/22 to 3/15/22 Patient has attended 7 out of 8 scheduled visits, with 1 cancellation(s) and 0 no shows. Treatment has emphasized pelvic floor muscle coordination, strength, and endurance, bladder health, body mechanics training, and lumbar mobility. Patient has made improvements in PFM coordination and strength resulting in a reduction in urinary incontinence, however continues to demonstrate deficits in controlling urinary incontinence with performing his job duties, particularly at the end of the day as he is fatigued. He also reports low back pain with performing his job and shows limited lumbar flexion, extension, and rotation range of motion. Pt has progressed with goals as indicated below at this point and will continue to benefit from skilled PT in order to achieve remaining goals and maximize functional outcomes in order to return to [facility].     INITIAL ASSESSMENT:  Mr. Latricia Zhou presents with decreased strength and endurance of pelvic floor muscles and increased tenderness and reduced connective tissue through his abdominal wall post operative RALP. Today he was educated on bladder health, kegel exercises, pelvic floor anatomy and role of musculature, and precautions with completion of strenuous activities. He required verbal cuing for proper activation and isolation of pelvic floor muscles. He was able to contract muscle for 10 seconds with minimal breath holding and accessory muscle use of gluteals. He was given kegel exercises to do at home to assist in maintaining continence. He was able to demonstrate improved coordination by the end of the session today. He will continue to benefit from skilled physical therapy to address above mentioned deficits and restore normal PFM function post operatively to minimize urinary incontinence. PROBLEM LIST (Impacting functional limitations):  1. Decreased Strength  2. Decreased ADL/Functional Activities  3. Decreased Activity Tolerance  4. Decreased Flexibility/Joint Mobility  5. Decreased [place] with Home Exercise Program  6. Decreased strength of pelvic floor which limits bladder control INTERVENTIONS PLANNED:  1. Neuromuscular re-education  2. Biofeedback as needed  3. HEP  4. Bladder retraining  5. Bladder education  6. Manual Therapy  7. Therapeutic Activites  8. Therapeutic Exercise/Strengthening     GOALS: (Goals have been discussed and agreed upon with patient.)  Short Term Functional Goals: Time frame: 4 weeks  1. Pt will demonstrate independence with basic PFM HEP to improve awareness, coordination, and timing of PFM. GOAL MET  2. Pt will demonstrate understanding of and ability to teach back appropriate water intake, bladder irritants, toileting frequency, and positioning for improved self-management of symptoms. BOYD MET  3.  Pt will demonstrate ability to isolate a pelvic floor contraction without breath holding and minimal to no accessory muscle use in order to implement the knack and/or urge suppression, reducing pad usage by 50%. GOAL MET  4. Pt will demonstrate ability to perform diaphragmatic breathing and quick flick pelvic floor contractions in order to implement urge suppression and reduce episodes of UI by 50%. GOAL MET  5. Pt will demonstrate ability to perform a coordinate PFM and TA contraction during exhalation for improve coordination with functional tasks such as lifting, bending, transitioning in/out of chairs. WORKING TOWARDS CONSISTENCY   Discharge Goals: Time frame: 8 weeks  1. Pt will demonstrate ability to voluntarily contract the pelvic floor muscles prior to a cough or sneeze for decreased leakage during increases in intra-abdominal pressure. GOAL MET  2. 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 WORKING TOWARDS  3. Pt will demonstrate independence with an advanced HEP for general conditioning, core stabilization, and mobility to facilitate carry over and independent management of symptoms. WORKING TOWARDS  4. Pt will be independent in implementation of a timed voiding schedule and use of urge suppression to reduce urinary frequency to 8-10/day and 2/night. GOAL MET  5. Pt will no longer require use of pads. Pt working towards. He is now using 1 PPD. Outcome Measure:   NIH: pain: 0, Urinary Symptoms 5, Quality of Life: 7    NIH at Re-Assessment: pain 0, Urinary Symptoms 4, Quality of Life 2    Medical Necessity:   · Patient is expected to demonstrate progress in strength, coordination and functional technique to minimize UI following prostatectomy. Reason for Services/Other Comments:  · Patient continues to require skilled intervention due to above mentioned deficits. Rehabilitation Potential For Stated Goals: Excellent  Regarding Mary Ann Bellamy's therapy, I certify that the treatment plan above will be carried out by a therapist or under their direction.   Thank you for this referral,  Jovan Kelley, PT     Referring Physician Signature: Thiago Garcia MD _______________________________ Date _____________            PAIN/SUBJECTIVE:   Initial: Pain Intensity 1: 6 /10 Post Session:  /10      HISTORY:   History of Present Injury/Illness (Reason for Referral):  Pt is a 74 yo male referred to pelvic floor physical therapy (PFPT) 2/2 prostate cancer by Thiago Garcia MD. Pt is currently 4 weeks post-operative RALP and hernia repair. He reports intermittent left inguinal discomfort. Described as sharp when it occurs. Not always associated with a transitional movements. Denies perineal discomfort. Denies dysuria. Denies urinary hesitancy. Intermittent discomfort with BM's. He is still taking a stool softener but needing to strain and groan to evacuate. Pt has had 3 UTI's since the procedure was completed. Cath was in for 10 days. 7 days after the procedure he had to be admitted to the hospital for 4 days with the UTI. Returned to work 3 weeks ago. He works in construction/tile. Urinary: Frequency 13+x/day, hourly and waking up with strong urinary urgency. The last 3 nights he slept without needing a diaper. Pre surgery he was waking up 3x/night. Positive for stress urinary incontinence. Positive for JEREMIAH with coughing, sneezing, lifting, transitioning. Negative for urinary urgency, incomplete emptying, urinary hesitancy/intermittency, dysuria and hematuria. Pt does use pads for protection; 1 diapers per day (PPD). Sexual: unable to have erection at this time. Fluid intake: 2 8 oz water/day; bladder irritants include: milk, 1 cup of coffee in am, beer. Other medical history: Back pain - currently managed with chiropractic care (30 years).        Past Medical History/Comorbidities:   Mr. Bony Feliciano  has a past medical history of Arthritis, Cancer ([address] Chronic pain, Emphysema lung ([facility] [address] (2021), Former smoker, stopped smoking in distant past, GERD (gastroesophageal reflux disease), Hypertension, and Sleep disorder. Mr. Trujillo Neither  has a past surgical history that includes hx colonoscopy (~2007); hx wisdom teeth extraction; hx urological; and hx endoscopy. Social History/Living Environment:   Pt lives with self  Alcohol use? Intermittent   Tobacco use? Quit smoking 11 years ago  Sexual abuse? no    Prior Level of Function/Work/Activity:  Prior level of function: WFL  Occupation: Construction/tile  Exercise activities: Walking,     Personal Factors:          Sex:  male     Ambulatory/Rehab Services H2 Model Falls Risk Assessment    Risk Factors:       No Risk Factors Identified Ability to Rise from Chair:       (0)  Ability to rise in a single movement    Falls Prevention Plan:       No modifications necessary   Total: (5 or greater = High Risk): 0    ©2010 [facility] [address] [place] [address] States Patent #[id number]. Federal Law prohibits the replication, distribution or use without written permission from [facility] [employer]     Current Medications:    Current Outpatient Medications:     cephALEXin (KEFLEX) 500 mg capsule, Take 1 Capsule by mouth four (4) times daily. , Disp: 12 Capsule, Rfl: 0    senna-docusate (PERICOLACE) 8.6-50 mg per tablet, Take 1 Tablet by mouth daily. , Disp: 30 Tablet, Rfl: 0    ondansetron (ZOFRAN ODT) 4 mg disintegrating tablet, Take 1 Tablet by mouth every eight (8) hours as needed for Nausea or Vomiting., Disp: 30 Tablet, Rfl: 0    ZINC PO, Take  by mouth daily. , Disp: , Rfl:     OTHER, 6 Capsules daily. Balance of nature., Disp: , Rfl:     omeprazole (PRILOSEC) 40 mg capsule, Take 40 mg by mouth daily. , Disp: , Rfl:     famotidine (PEPCID) 40 mg tablet, Take 40 mg by mouth nightly., Disp: , Rfl:     cyclobenzaprine (FLEXERIL) 5 mg tablet, Take 5 mg by mouth as needed. , Disp: , Rfl:     traMADoL (ULTRAM) 50 mg tablet, Take 50 mg by mouth every eight (8) hours as needed. , Disp: , Rfl:     losartan (COZAAR) 100 mg tablet, Take 100 mg by mouth daily. , Disp: , Rfl:     fluticasone propionate (FLONASE) 50 mcg/actuation nasal spray, 2 Sprays by Both Nostrils route daily as needed. , Disp: , Rfl:     finasteride (PROSCAR) 5 mg tablet, Take 1 Tablet by mouth daily. (Patient taking differently: Take 5 mg by mouth Every morning.), Disp: 90 Tablet, Rfl: 4    LORazepam (ATIVAN) 1 mg tablet, Take 1 mg by mouth nightly., Disp: , Rfl:     tamsulosin (FLOMAX) 0.4 mg capsule, Take 1 Cap by mouth daily. , Disp: 90 Cap, Rfl: 4   Date Last Reviewed:  3/15/2022   Number of Personal Factors/Comorbidities that affect the Plan of Care: 1-2: MODERATE COMPLEXITY   EXAMINATION:   External Observation:   · Voluntary Contraction: present  · Voluntary Relaxation: present  · Involuntary Contraction: present  · Involuntary Relaxation: present   · Lumbar ROM: Limited flexion, extension, and rotation (bilaterally). SEMG evaluation: Date 3/15/22  Position: side-lying, Electrode type: sEMG  sEMG biofeedback:   PFM resting <1.0 mV   20 mV at 10 sec hold x 20 reps  Quick flicks to >06 mV    Quality of:    Recruitment: [] slow     [] fair     [x] good   Derecruitment: [] slow     [] fair     [x] good   Holding: [] slow     [] fair     [x] good   Stability of hold: [] slow     [x] fair     [x] good   Stability of rest: [] slow     [] fair     [x] good   Baseline b/t contract: [] slow     [] fair     [x] good   Overflow: [] absent     [x] min     [] mod     [] severe abdominals         Quick flicks well coordinated. External palpation:  Muscle/muscle group Tender?    Adductors [] Right  [] Left  []N/T   Gluteals [] Right  [] Left  []N/T   Piriformis [] Right  [] Left  []N/T   Iliopsoas [] Right  [] Left  []N/T   Abdominal wall [] Right  [] Left  []N/T     Pubic symphysis [] Right  [] Left  []N/T   Erector spinae Hypertrophied, tender to palpation     Breath assessment:  Observation: chest breathing dominant and A/P chest expansion  Coordination of pelvic floor muscles with breath: coordination intact.    Co-contraction of PFM with transversus abdominis: present             Cherylene Freud, PT, DPT, [facility]

## 2022-03-18 PROBLEM — C61 PROSTATE CANCER (HCC): Status: ACTIVE | Noted: 2021-11-02

## 2022-03-19 PROBLEM — R50.9 FEVER: Status: ACTIVE | Noted: 2021-11-08

## 2022-03-28 ENCOUNTER — HOSPITAL ENCOUNTER (OUTPATIENT)
Dept: PHYSICAL THERAPY | Age: 67
Discharge: HOME | End: 2022-03-28
Attending: UROLOGY
Payer: MEDICARE

## 2022-03-28 PROCEDURE — 97110 THERAPEUTIC EXERCISES: CPT

## 2022-03-28 PROCEDURE — 97140 MANUAL THERAPY 1/> REGIONS: CPT

## 2022-03-28 NOTE — PROGRESS NOTES
Claribel Johnson  : 1955  [place]  Secondary: Amos Bowers at [place] [id number] [facility], [address] [place] [address],[address], [facility] [address].  [phone]   [phone]          OUTPATIENT PHYSICAL THERAPY: Daily Treatment Note 3/28/2022   Visit Count:  8   Lack of coordination (muscle incoordination) (R27.8), Stress incontinence (female) (male) (N39.3) and Lower abdominal pain, unspecified (R10.30)  Pre-treatment Symptoms/Complaints:  JEREMIAH    Pt is struggling with LBP. 7/10 currently. Feels very stiff most days. Worse in the PM. Improved in the AM.     Urinary leakage is much less. Reduced urinary frequency from 10x/night to 2x/night. Pain: Initial:   7/10 Post Session:  7/10   Medications Last Reviewed:  3/28/2022  Updated Objective Findings:    sEMG biofeedback:   PFM resting <1.0 mV   20 mV at 10 sec hold x 20 reps  Quick flicks to >25 mV     TREATMENT:     THERAPEUTIC EXERCISE: (40 minutes):  Exercises per grid below to improve mobility, strength, and coordination. Required minimal visual and verbal cues to promote proper body mechanics and promote proper body breathing techniques. Progressed resistance, range, repetitions, and complexity of movement as indicated.      Date:  1/10/22 Date:  22 Date:  2/15/22 Date:  22 Date:  3/4/22 Date:  3/15/22 Date:  3/28/22   TE Parameters Parameters Parameters Parameters Parameters Parameters Parameters   HEP - kegal program 3x/day:  10 x 10 sec hold  3 x 5 quick flicks Reviewed HEP Reviewed HEP, updated with lumbo-pelvic mobility Reviewed Reviewed  Reviewed    Kegals Completed with sEMG biofeedback in session - side-lying Completed with sEMG biofeedback in session - side-lying    Coordinated with breathing, quick flicks, PF + TrA, co-activation endurance holds x 10 sec x 10 Completed with sEMG biofeedback in session - side-lying    Coordinated with breathing, quick flicks, PF + TrA, co-activation endurance holds x 10 sec x 10, progressive contractions Completed with sEMG biofeedback in session - side-lying and standing    Coordinated with breathing, quick flicks, PF + TrA, co-activation endurance holds x 10 sec x 10,  Completed with sEMG biofeedback in session - side-lying     Coordinated with breathing, quick flicks, PF + TrA, co-activation endurance holds x 15 sec x 10, 15 x 5 sec holds, x 10 progressive contractions Completed with sEMG biofeedback in session - side-lying     Coordinated with breathing, quick flicks, PF + TrA, co-activation endurance holds x 10 sec hold, 20 reps  Progressive, quick flicks x 30, descending contractions. biofeedback in session - side-lying     Coordinated with breathing, quick flicks, PF + TrA, co-activation endurance holds x 10 sec hold, 20 reps  Progressive, quick flicks x 30, descending contractions. Sit to stand with hip abduction  2 x 10, black TB        Step-up - front and lateral  X 20        Rows  2 x 15 2 x 15  2 x 15      Seated lumbar stretch  plyo-ball rolls x 10  yusuf pose lumbar stretch   X 15   PPT + hip adduction   X 10       Single knee to chest   3 x 30 sec 3 x 30 sec 3 x 30 sec 3 x 30 sec    Lower trunk rotation   x5 X 20 X 20 X 20     Supine march   2 x 10   1 x 10  2 x 10    Cat/Cow    X 10  X 10 with tactile cues X 10   Bridge     X 15 + hip adduction/ball squeeze X 20 + hip adduction/ball squeeze    Piriformis stretch    3 x 30 sec  3 x 30 sec    Bird dog      X 10 x 5 sec    Hamstring stretch      3 x 30 sec    Prone press-up       x10      Access Code: [email or web address]  URL: [email or web address]  Date: 02/15/2022  Prepared by:  [facility]    Exercises  Supine Posterior Pelvic Tilt with Pelvic Floor Contraction - 7 x weekly - 10 reps  Hooklying Single Knee to Chest Stretch - 1 x daily - 7 x weekly - 3 reps - 1 min hold  Supine Lower Trunk Rotation - 1 x daily - 7 x weekly - 4-5 reps - 30 sec hold  Supine March - 1 x daily - 7 x weekly - 2 sets - 10 reps    THERAPEUTIC ACTIVITY: ( minutes): Functional activity bladder health education on avoiding bladder irritants to reduce episodes of urge urinary incontinence and urgency/frequency. Patient was provided a list of common bladder irritants including caffeine, carbonation, alcohol, artificial sweeteners, chocolate, acidic drinks, and tomato based drinks. and Instruction on coordinated pelvic floor and diaphragmatic breathing to improve kinesthetic awareness of pelvic muscle mobility and restore proper motor recruitment patterns with breathing, posture, and functional movement (e.g. appropriate lift/contraction with increased IAP such as a cough, laugh, sneeze to prevent incontinence). TA Educational Topic Notes Date Completed   Pathology/Anatomy/PFM Function Completed 1/10/22   Bladder health education Completed 1/10/22   Urinary urge suppression Provided handout, education on delayed voiding as able with urge suppression  Reviewed 1/25/25 2/1/22   The yesica     Voiding strategies     Bowel/Bladder log     Bowel health education     Constipation care     Diarrhea/Fecal leakage     Colonic massage     Toilet positioning     Defecation dynamics     Sources of fiber     Return to intercourse/Dilator training     Sexual positioning     Lubricants/vaginal moisturizers     Vaginal irritants     Body mechanics Lifting -  Box from floor #30  Quadruped - pulls with resistance and lift (10lb) 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        Date Type Location Comments   1/10/22 CTM Abdomina wall - inge-incisional      15 minutes CTM to Mid to low back paraspinals                           (Used abbreviations: MET - muscle energy technique; SCS- Strain counter strain; CTM-Connective tissue mobilizations; CR- Contract/relax; SP- Sustained pressure, TrP-Trigger point release, IASTM- Instrument assisted soft tissue mobilizations, TDN-Trigger point dry needling)      Treatment/Session Summary:    · Response to Treatment: Pt continues to report reduction in UI daily. Quick transitional movements seem to be most problematic in controlling UI at work. He remains limited in lumbar mobility and had difficulty transitioning on/off floor today as a result.    · Communication/Consultation:  None today  · Equipment provided today:  None today  · Variation from POC:  · Recommendations/Intent for next treatment session: Next visit will focus on lumbo-pelvic mobility, TrA progression - isometric, progress PFM training standing  Total Treatment Billable Duration: Treatment minutes 269 [facility], PT    Visit # Therapist initials Date Arrived NS/ Cx < 24 hr >24 hr Cx Visit Comments   1 MT 1/10/22 Yes   Initial Assessment and Treatment   2 MT 1/25/22 Yes   Daily    3 MT 2/1/22 Yes   Daily     MT   CX  Due to car trouble   4 MT 2/15/22    Daily    5 MT 2/22/22 Yes   Daily    6 MT 3/4/22 Yes   Daily    7 MT 3/15/22 Yes   Daily    8 MT 3/28/22 Yes   Daily                                                                                      Abbreviations: NS = No Show; CX = cancelled     Future Appointments   Date Time Provider Jesus Benedict   4/4/2022  3:00 [facility]   4/8/2022 10:20 AM [address] [place] [address] [employer] [facility] [address] [place] [address]   4/8/2022 10:45 AM Cherelle Tate MD [facility]   4/11/2022  3:00 PM Chuck Vidales PT [facility]LA   4/18/2022  3:00 PM NITIN PalmerE

## 2022-04-04 ENCOUNTER — HOSPITAL ENCOUNTER (OUTPATIENT)
Dept: PHYSICAL THERAPY | Age: 67
Discharge: HOME | End: 2022-04-04
Attending: UROLOGY
Payer: MEDICARE

## 2022-04-04 PROCEDURE — 97110 THERAPEUTIC EXERCISES: CPT

## 2022-04-04 PROCEDURE — 97140 MANUAL THERAPY 1/> REGIONS: CPT

## 2022-04-04 NOTE — PROGRESS NOTES
Faraz Pacific  : 1955  [place]  Secondary: Amos Bowers at [address], [address] [place] [address],[address], [place] [address].  [phone]   [phone]          OUTPATIENT PHYSICAL THERAPY: Daily Treatment Note 2022   Visit Count:  9   Lack of coordination (muscle incoordination) (R27.8), Stress incontinence (female) (male) (N39.3) and Lower abdominal pain, unspecified (R10.30)  Pre-treatment Symptoms/Complaints:  JEREMIAH    Pt has transitioned to ultra thin pads. He does not wear them at night. Pt continues to struggle with LBP. He saw an NP. X-rays revealed no evident findings. He is taking 1 hydrocodone per day. He notices pain even on the days he does not work. He will see his primary care physician. Urinary leakage is much less. Pain: Initial: Pain Intensity 1: 10 Post Session:  2/10   Medications Last Reviewed:  2022  Updated Objective Findings:    sEMG biofeedback:   PFM resting <1.0 mV   40 mV at 10 sec hold x 20 reps  Quick flicks to >13 mV     TREATMENT:     THERAPEUTIC EXERCISE: (35 minutes):  Exercises per grid below to improve mobility, strength, and coordination. Required minimal visual and verbal cues to promote proper body mechanics and promote proper body breathing techniques. Progressed resistance, range, repetitions, and complexity of movement as indicated.      Date:  1/10/22 Date:  22 Date:  2/15/22 Date:  22 Date:  3/4/22 Date:  3/15/22 Date:  3/28/22 Date:  22   TE Parameters Parameters Parameters Parameters Parameters Parameters Parameters Parameters   HEP - kegal program 3x/day:  10 x 10 sec hold  3 x 5 quick flicks Reviewed HEP Reviewed HEP, updated with lumbo-pelvic mobility Reviewed Reviewed  Reviewed  Review of lumbo-pelvic mobility   Kegals Completed with sEMG biofeedback in session - side-lying Completed with sEMG biofeedback in session - side-lying    Coordinated with breathing, quick flicks, PF + TrA, co-activation endurance holds x 10 sec x 10 Completed with sEMG biofeedback in session - side-lying    Coordinated with breathing, quick flicks, PF + TrA, co-activation endurance holds x 10 sec x 10, progressive contractions Completed with sEMG biofeedback in session - side-lying and standing    Coordinated with breathing, quick flicks, PF + TrA, co-activation endurance holds x 10 sec x 10,  Completed with sEMG biofeedback in session - side-lying     Coordinated with breathing, quick flicks, PF + TrA, co-activation endurance holds x 15 sec x 10, 15 x 5 sec holds, x 10 progressive contractions Completed with sEMG biofeedback in session - side-lying     Coordinated with breathing, quick flicks, PF + TrA, co-activation endurance holds x 10 sec hold, 20 reps  Progressive, quick flicks x 30, descending contractions. biofeedback in session - side-lying     Coordinated with breathing, quick flicks, PF + TrA, co-activation endurance holds x 10 sec hold, 20 reps  Progressive, quick flicks x 30, descending contractions.  biofeedback in session - side-lying     Coordinated with breathing, quick flicks, PF + TrA, co-activation endurance holds x 10 sec hold, 20 reps  Progressive, quick flicks x 30, descending contractions (5 sec holds)   Sit to stand with hip abduction  2 x 10, black TB         Step-up - front and lateral  X 20         Rows  2 x 15 2 x 15  2 x 15       Seated lumbar stretch  plyo-ball rolls x 10  yusuf pose lumbar stretch   X 15    PPT + hip adduction   X 10        Single knee to chest   3 x 30 sec 3 x 30 sec 3 x 30 sec 3 x 30 sec     Lower trunk rotation   x5 X 20 X 20 X 20      Supine march   2 x 10   1 x 10  2 x 10     Cat/Cow    X 10  X 10 with tactile cues X 10 yusuf pose 5 x 10 sec   Bridge     X 15 + hip adduction/ball squeeze X 20 + hip adduction/ball squeeze     Piriformis stretch    3 x 30 sec  3 x 30 sec     Bird dog      X 10 x 5 sec Hamstring stretch      3 x 30 sec     Prone press-up       x10  X 5 x 10 sec     Access Code: [email or web address]  URL: [email or web address]  Date: 02/15/2022  Prepared by: [facility] OF [facility]    Exercises  Supine Posterior Pelvic Tilt with Pelvic Floor Contraction - 7 x weekly - 10 reps  Hooklying Single Knee to Chest Stretch - 1 x daily - 7 x weekly - 3 reps - 1 min hold  Supine Lower Trunk Rotation - 1 x daily - 7 x weekly - 4-5 reps - 30 sec hold  Supine March - 1 x daily - 7 x weekly - 2 sets - 10 reps    THERAPEUTIC ACTIVITY: ( minutes): Functional activity bladder health education on avoiding bladder irritants to reduce episodes of urge urinary incontinence and urgency/frequency. Patient was provided a list of common bladder irritants including caffeine, carbonation, alcohol, artificial sweeteners, chocolate, acidic drinks, and tomato based drinks. and Instruction on coordinated pelvic floor and diaphragmatic breathing to improve kinesthetic awareness of pelvic muscle mobility and restore proper motor recruitment patterns with breathing, posture, and functional movement (e.g. appropriate lift/contraction with increased IAP such as a cough, laugh, sneeze to prevent incontinence).      TA Educational Topic Notes Date Completed   Pathology/Anatomy/PFM Function Completed 1/10/22   Bladder health education Completed 1/10/22   Urinary urge suppression Provided handout, education on delayed voiding as able with urge suppression  Reviewed 1/25/25 2/1/22   The yesica     Voiding strategies     Bowel/Bladder log     Bowel health education     Constipation care     Diarrhea/Fecal leakage     Colonic massage     Toilet positioning     Defecation dynamics     Sources of fiber     Return to intercourse/Dilator training     Sexual positioning     Lubricants/vaginal moisturizers     Vaginal irritants     Body mechanics Lifting -  Box from floor #30  Quadruped - pulls with resistance and lift (10lb) 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 Date Type Location Comments   1/10/22 CTM Abdomina wall - inge-incisional      15 minutes CTM to Mid to low back paraspinals                           (Used abbreviations: MET - muscle energy technique; SCS- Strain counter strain; CTM-Connective tissue mobilizations; CR- Contract/relax; SP- Sustained pressure, TrP-Trigger point release, IASTM- Instrument assisted soft tissue mobilizations, TDN-Trigger point dry needling)    Treatment/Session Summary:    · Response to Treatment: Pt progressing well with managing UI reporting reduction in pad usage and thickness of pad used. Pt reported reduced back pain from 6/10 to 2/10 post tx.    · Communication/Consultation:  None today  · Equipment provided today:  None today  · Variation from POC:  · Recommendations/Intent for next treatment session: Next visit will focus on lumbo-pelvic mobility, TrA progression - isometric, progress PFM training standing  Total Treatment Billable Duration: Treatment minutes 50  PT Patient Time In/Time Out  Time In: 0310  Time Out: 101 [facility], PT    Visit # Therapist initials Date Arrived NS/ Cx < 24 hr >24 hr Cx Visit Comments   1 MT 1/10/22 Yes   Initial Assessment and Treatment   2 MT 1/25/22 Yes   Daily    3 MT 2/1/22 Yes   Daily     MT   CX  Due to car trouble   4 MT 2/15/22    Daily    5 MT 2/22/22 Yes   Daily    6 MT 3/4/22 Yes   Daily    7 MT 3/15/22 Yes   Daily    8 MT 3/28/22 Yes   Daily   9 MT 4/4/22 Yes   Daily                                                                              Abbreviations: NS = No Show; CX = cancelled     Future Appointments   Date Time Provider Jesus Benedict   4/8/2022 10:20 AM Neto [address] [place] [address]   4/8/2022 10:45 AM Davon De Leon MD [facility]   4/11/2022  3:00 PM Abhishek Amor PT [facility]   4/18/2022  3:00 PM Abhishek Amor PT [facility]

## 2022-04-11 ENCOUNTER — HOSPITAL ENCOUNTER (OUTPATIENT)
Dept: PHYSICAL THERAPY | Age: 67
Discharge: HOME | End: 2022-04-11
Attending: UROLOGY
Payer: MEDICARE

## 2022-04-11 PROCEDURE — 97110 THERAPEUTIC EXERCISES: CPT

## 2022-04-11 NOTE — PROGRESS NOTES
Neelam Sorto  : 1955  [place]  Secondary: Amos Carter 76 at [address], [address] [place] [address],[address], [address] [facility]  [phone]   [phone]          OUTPATIENT PHYSICAL THERAPY: Daily Treatment Note 2022   Visit Count:  10   Lack of coordination (muscle incoordination) (R27.8), Stress incontinence (female) (male) (N39.3) and Lower abdominal pain, unspecified (R10.30)  Pre-treatment Symptoms/Complaints:  JEREMIAH    Pt will see his primary care doctor tomorrow regarding his back. His back continues to be very painful. He felt a little better yesterday because he was very inactive. Today after work, his pain is back at 7/10 pain. Took hydrocodone at 1pm.    Urinary leakage continues to improve. Down to 1PPD. Pain: Initial:  710 Post Session:  2/10   Medications Last Reviewed:  2022  Updated Objective Findings:    sEMG biofeedback:   PFM resting <1.0 mV   40 mV at 10 sec hold x 20 reps  Quick flicks to >43 mV    Pad usage: 1 PPD     TREATMENT:     THERAPEUTIC EXERCISE: (55 minutes):  Exercises per grid below to improve mobility, strength, and coordination. Required minimal visual and verbal cues to promote proper body mechanics and promote proper body breathing techniques. Progressed resistance, range, repetitions, and complexity of movement as indicated.      Date:  1/10/22 Date:  22 Date:  2/15/22 Date:  22 Date:  3/4/22 Date:  3/15/22 Date:  3/28/22 Date:  22 Date:  22   TE Parameters Parameters Parameters Parameters Parameters Parameters Parameters Parameters Parameters   HEP - kegal program 3x/day:  10 x 10 sec hold  3 x 5 quick flicks Reviewed HEP Reviewed HEP, updated with lumbo-pelvic mobility Reviewed Reviewed  Reviewed  Review of lumbo-pelvic mobility    Kegals Completed with sEMG biofeedback in session - side-lying Completed with sEMG biofeedback in session - side-lying    Coordinated with breathing, quick flicks, PF + TrA, co-activation endurance holds x 10 sec x 10 Completed with sEMG biofeedback in session - side-lying    Coordinated with breathing, quick flicks, PF + TrA, co-activation endurance holds x 10 sec x 10, progressive contractions Completed with sEMG biofeedback in session - side-lying and standing    Coordinated with breathing, quick flicks, PF + TrA, co-activation endurance holds x 10 sec x 10,  Completed with sEMG biofeedback in session - side-lying     Coordinated with breathing, quick flicks, PF + TrA, co-activation endurance holds x 15 sec x 10, 15 x 5 sec holds, x 10 progressive contractions Completed with sEMG biofeedback in session - side-lying     Coordinated with breathing, quick flicks, PF + TrA, co-activation endurance holds x 10 sec hold, 20 reps  Progressive, quick flicks x 30, descending contractions. biofeedback in session - side-lying     Coordinated with breathing, quick flicks, PF + TrA, co-activation endurance holds x 10 sec hold, 20 reps  Progressive, quick flicks x 30, descending contractions.  biofeedback in session - side-lying     Coordinated with breathing, quick flicks, PF + TrA, co-activation endurance holds x 10 sec hold, 20 reps  Progressive, quick flicks x 30, descending contractions (5 sec holds) biofeedback in session - side-lying     Coordinated with breathing, quick flicks, PF + TrA, co-activation endurance holds x 10 sec hold, 20 reps  Progressive,  quick flicks x 30, descending contractions (5 sec holds)   Sit to stand with hip abduction  2 x 10, black TB          Step-up - front and lateral  X 20          Rows  2 x 15 2 x 15  2 x 15        Seated lumbar stretch  plyo-ball rolls x 10  yusuf pose lumbar stretch   X 15     PPT + hip adduction   X 10         Single knee to chest   3 x 30 sec 3 x 30 sec 3 x 30 sec 3 x 30 sec      Lower trunk rotation   x5 X 20 X 20 X 20       Supine march   2 x 10   1 x 10  2 x 10 Cat/Cow    X 10  X 10 with tactile cues X 10 yusuf pose 5 x 10 sec yusuf pose 5 x 10 sec   Bridge     X 15 + hip adduction/ball squeeze X 20 + hip adduction/ball squeeze      Piriformis stretch    3 x 30 sec  3 x 30 sec      Bird dog      X 10 x 5 sec      Hamstring stretch      3 x 30 sec      Prone press-up       x10  X 5 x 10 sec X 5 x 10 sec   Foam roll         Inferior scap to mid back roll out, angels 1/2 foam roll     Access Code: [email or web address]  URL: [email or web address]  Date: 02/15/2022  Prepared by: [facility] OF [facility]    Exercises  Supine Posterior Pelvic Tilt with Pelvic Floor Contraction - 7 x weekly - 10 reps  Hooklying Single Knee to Chest Stretch - 1 x daily - 7 x weekly - 3 reps - 1 min hold  Supine Lower Trunk Rotation - 1 x daily - 7 x weekly - 4-5 reps - 30 sec hold  Supine March - 1 x daily - 7 x weekly - 2 sets - 10 reps    THERAPEUTIC ACTIVITY: ( minutes): Functional activity bladder health education on avoiding bladder irritants to reduce episodes of urge urinary incontinence and urgency/frequency. Patient was provided a list of common bladder irritants including caffeine, carbonation, alcohol, artificial sweeteners, chocolate, acidic drinks, and tomato based drinks. and Instruction on coordinated pelvic floor and diaphragmatic breathing to improve kinesthetic awareness of pelvic muscle mobility and restore proper motor recruitment patterns with breathing, posture, and functional movement (e.g. appropriate lift/contraction with increased IAP such as a cough, laugh, sneeze to prevent incontinence).      TA Educational Topic Notes Date Completed   Pathology/Anatomy/PFM Function Completed 1/10/22   Bladder health education Completed 1/10/22   Urinary urge suppression Provided handout, education on delayed voiding as able with urge suppression  Reviewed 1/25/25 2/1/22   The yesica     Voiding strategies     Bowel/Bladder log     Bowel health education     Constipation care     Diarrhea/Fecal leakage     Colonic massage     Toilet positioning     Defecation dynamics     Sources of fiber     Return to intercourse/Dilator training     Sexual positioning     Lubricants/vaginal moisturizers     Vaginal irritants     Body mechanics Lifting -  Box from floor #30  Quadruped - pulls with resistance and lift (10lb) to mimic work pulling carpet and placing tile 1/25/25   Posture/ergonomics     Diaphragmatic breathing     Resources and technology       MANUAL THERAPY: ( minutes): Soft tissue mobilization was utilized and necessary because of the patient's restricted motion of soft tissue. Date Type Location Comments   1/10/22 CTM Abdomina wall - inge-incisional      15 minutes CTM to Mid to low back paraspinals                           (Used abbreviations: MET - muscle energy technique; SCS- Strain counter strain; CTM-Connective tissue mobilizations; CR- Contract/relax; SP- Sustained pressure, TrP-Trigger point release, IASTM- Instrument assisted soft tissue mobilizations, TDN-Trigger point dry needling)    Treatment/Session Summary:    · Response to Treatment: Pt is progressing well, reporting reduction in UI at work and in his home environment. He will be ready to discharge from PFPT next visit.   · Communication/Consultation:  None today  · Equipment provided today:  None today  · Variation from POC:  · Recommendations/Intent for next treatment session: Next visit will focus on review HEP, discharge  Total Treatment Billable Duration: Treatment minutes 269 [facility], PT    Visit # Therapist initials Date Arrived NS/ Cx < 24 hr >24 hr Cx Visit Comments   1 MT 1/10/22 Yes   Initial Assessment and Treatment   2 MT 1/25/22 Yes   Daily    3 MT 2/1/22 Yes   Daily     MT   CX  Due to car trouble   4 MT 2/15/22    Daily    5 MT 2/22/22 Yes   Daily    6 MT 3/4/22 Yes   Daily    7 MT 3/15/22 Yes   Daily    8 MT 3/28/22 Yes   Daily   9 MT 4/4/22 Yes   Daily    10 MT 4/11/22 Yes   Daily Abbreviations: NS = No Show; CX = cancelled     Future Appointments   Date Time Provider Jesus Benedict   4/18/2022  3:00 PM Meagan Shea PT [facility]

## 2022-04-18 ENCOUNTER — HOSPITAL ENCOUNTER (OUTPATIENT)
Dept: LAB | Age: 67
Discharge: HOME | End: 2022-04-18
Payer: MEDICARE

## 2022-04-18 ENCOUNTER — HOSPITAL ENCOUNTER (OUTPATIENT)
Dept: PHYSICAL THERAPY | Age: 67
Discharge: HOME | End: 2022-04-18
Attending: UROLOGY
Payer: MEDICARE

## 2022-04-18 DIAGNOSIS — C61 PROSTATE CANCER (HCC): ICD-10-CM

## 2022-04-18 PROCEDURE — 97530 THERAPEUTIC ACTIVITIES: CPT

## 2022-04-18 PROCEDURE — 36415 COLL VENOUS BLD VENIPUNCTURE: CPT

## 2022-04-18 PROCEDURE — 84153 ASSAY OF PSA TOTAL: CPT

## 2022-04-18 PROCEDURE — 97110 THERAPEUTIC EXERCISES: CPT

## 2022-04-18 NOTE — PROGRESS NOTES
Pj Matias  : 1955  [place]  Secondary: Amos Bowers at [address], [address] [place] [address],[address], [address]  [phone]   [phone]          OUTPATIENT PHYSICAL THERAPY: Daily Treatment Note 2022   Visit Count:  11   Lack of coordination (muscle incoordination) (R27.8), Stress incontinence (female) (male) (N39.3) and Lower abdominal pain, unspecified (R10.30)  Pre-treatment Symptoms/Complaints:  JEREMIAH    Pt had f/u with urology. This appointment went well. He feels his urinary control has improved. He will have an MRI on his back. Pain: Initial: Pain Intensity 1: 4/10 LBP Post Session:  2/10   Medications Last Reviewed:  2022  See discharge summary     TREATMENT:     THERAPEUTIC EXERCISE: (30 minutes):  Exercises per grid below to improve mobility, strength, and coordination. Required minimal visual and verbal cues to promote proper body mechanics and promote proper body breathing techniques. Progressed resistance, range, repetitions, and complexity of movement as indicated.      Date:  1/10/22 Date:  22 Date:  2/15/22 Date:  22 Date:  3/4/22 Date:  3/15/22 Date:  3/28/22 Date:  22 Date:  22 Date:  22   TE Parameters Parameters Parameters Parameters Parameters Parameters Parameters Parameters Parameters Parameters   HEP - kegal program 3x/day:  10 x 10 sec hold  3 x 5 quick flicks Reviewed HEP Reviewed HEP, updated with lumbo-pelvic mobility Reviewed Reviewed  Reviewed  Review of lumbo-pelvic mobility  Review HEP for discharge   Kegals Completed with sEMG biofeedback in session - side-lying Completed with sEMG biofeedback in session - side-lying    Coordinated with breathing, quick flicks, PF + TrA, co-activation endurance holds x 10 sec x 10 Completed with sEMG biofeedback in session - side-lying    Coordinated with breathing, quick flicks, PF + TrA, co-activation endurance holds x 10 sec x 10, progressive contractions Completed with sEMG biofeedback in session - side-lying and standing    Coordinated with breathing, quick flicks, PF + TrA, co-activation endurance holds x 10 sec x 10,  Completed with sEMG biofeedback in session - side-lying     Coordinated with breathing, quick flicks, PF + TrA, co-activation endurance holds x 15 sec x 10, 15 x 5 sec holds, x 10 progressive contractions Completed with sEMG biofeedback in session - side-lying     Coordinated with breathing, quick flicks, PF + TrA, co-activation endurance holds x 10 sec hold, 20 reps  Progressive, quick flicks x 30, descending contractions. biofeedback in session - side-lying     Coordinated with breathing, quick flicks, PF + TrA, co-activation endurance holds x 10 sec hold, 20 reps  Progressive, quick flicks x 30, descending contractions.  biofeedback in session - side-lying     Coordinated with breathing, quick flicks, PF + TrA, co-activation endurance holds x 10 sec hold, 20 reps  Progressive, quick flicks x 30, descending contractions (5 sec holds) biofeedback in session - side-lying     Coordinated with breathing, quick flicks, PF + TrA, co-activation endurance holds x 10 sec hold, 20 reps  Progressive,  quick flicks x 30, descending contractions (5 sec holds) Kegals with sEMG biofeedback 20 x 10 sec hold/10 sec rest    X 20 quick flicks   Sit to stand with hip abduction  2 x 10, black TB           Step-up - front and lateral  X 20           Rows  2 x 15 2 x 15  2 x 15         Seated lumbar stretch  plyo-ball rolls x 10  yusuf pose lumbar stretch   X 15      PPT + hip adduction   X 10          Single knee to chest   3 x 30 sec 3 x 30 sec 3 x 30 sec 3 x 30 sec       Lower trunk rotation   x5 X 20 X 20 X 20        Supine march   2 x 10   1 x 10  2 x 10       Cat/Cow    X 10  X 10 with tactile cues X 10 yusuf pose 5 x 10 sec yusuf pose 5 x 10 sec    Bridge     X 15 + hip adduction/ball squeeze X 20 + hip adduction/ball squeeze       Piriformis stretch    3 x 30 sec  3 x 30 sec       Bird dog      X 10 x 5 sec       Hamstring stretch      3 x 30 sec       Prone press-up       x10  X 5 x 10 sec X 5 x 10 sec    Foam roll         Inferior scap to mid back roll out, angels 1/2 foam roll      Access Code: [email or web address]  URL: [email or web address]  Date: 02/15/2022  Prepared by: [facility]    Exercises  Supine Posterior Pelvic Tilt with Pelvic Floor Contraction - 7 x weekly - 10 reps  Hooklying Single Knee to Chest Stretch - 1 x daily - 7 x weekly - 3 reps - 1 min hold  Supine Lower Trunk Rotation - 1 x daily - 7 x weekly - 4-5 reps - 30 sec hold  Supine March - 1 x daily - 7 x weekly - 2 sets - 10 reps    THERAPEUTIC ACTIVITY: (10 minutes): Functional activity bladder health education on avoiding bladder irritants to reduce episodes of urge urinary incontinence and urgency/frequency. Patient was provided a list of common bladder irritants including caffeine, carbonation, alcohol, artificial sweeteners, chocolate, acidic drinks, and tomato based drinks. and Instruction on coordinated pelvic floor and diaphragmatic breathing to improve kinesthetic awareness of pelvic muscle mobility and restore proper motor recruitment patterns with breathing, posture, and functional movement (e.g. appropriate lift/contraction with increased IAP such as a cough, laugh, sneeze to prevent incontinence).      TA Educational Topic Notes Date Completed   Pathology/Anatomy/PFM Function Completed 1/10/22   Bladder health education Completed 1/10/22   Urinary urge suppression Provided handout, education on delayed voiding as able with urge suppression  Reviewed 1/25/25 2/1/22   The yesica     Voiding strategies     Bowel/Bladder log     Bowel health education     Constipation care     Diarrhea/Fecal leakage     Colonic massage     Toilet positioning     Defecation dynamics     Sources of fiber     Return to intercourse/Dilator training Sexual positioning     Lubricants/vaginal moisturizers     Vaginal irritants     Body mechanics Lifting -  Box from floor #30  Quadruped - pulls with resistance and lift (10lb) to mimic work pulling carpet and placing tile  Reviewed body mechanics and management of IAP prior to discharge 1/25/25 4/18/22   Posture/ergonomics     Diaphragmatic breathing     Resources and technology       MANUAL THERAPY: ( minutes): Soft tissue mobilization was utilized and necessary because of the patient's restricted motion of soft tissue.        Date Type Location Comments   1/10/22 CTM Abdomina wall - inge-incisional      15 minutes CTM to Mid to low back paraspinals                           (Used abbreviations: MET - muscle energy technique; SCS- Strain counter strain; CTM-Connective tissue mobilizations; CR- Contract/relax; SP- Sustained pressure, TrP-Trigger point release, IASTM- Instrument assisted soft tissue mobilizations, TDN-Trigger point dry needling)    Treatment/Session Summary:    · Response to Treatment: Discharge with HEP  · Communication/Consultation:  None today  · Equipment provided today:  None today  · Variation from POC:  · Recommendations/Intent for next treatment session: Next visit will focus on discharge with HEP  Total Treatment Billable Duration: Treatment minutes 40  PT Patient Time In/Time Out  Time In: 0305  Time Out: [place] [address] [facility] [address], PT    Visit # Therapist initials Date Arrived NS/ Cx < 24 hr >24 hr Cx Visit Comments   1 MT 1/10/22 Yes   Initial Assessment and Treatment   2 MT 1/25/22 Yes   Daily    3 MT 2/1/22 Yes   Daily     MT   CX  Due to car trouble   4 MT 2/15/22    Daily    5 MT 2/22/22 Yes   Daily    6 MT 3/4/22 Yes   Daily    7 MT 3/15/22 Yes   Daily    8 MT 3/28/22 Yes   Daily   9 MT 4/4/22 Yes   Daily    10 MT 4/11/22 Yes   Daily    11 MT 4/18/22 Yes   Daily                                                            Abbreviations: NS = No Show; CX = cancelled     Future Appointments Date Time Provider Jesus Benedict   10/21/2022 10:00 AM Neto [address] [facility]   10/21/2022 10:45 AM Arun Ladd MD [facility]

## 2022-04-18 NOTE — THERAPY DISCHARGE
Barbra Ledbetter  : 1955  [place]  Secondary: Amos Bowers at [facility]  [address] [facility], [address] [place] [address],[address], [address]  [phone]   [phone]        OUTPATIENT PHYSICAL [facility] 2022    ICD-10: Treatment Diagnosis: Lack of coordination (muscle incoordination) (R27.8), Stress incontinence (female) (male) (N39.3), Low back pain (M54.5 ) and Lower abdominal pain, unspecified (R10.30)  Precautions/Allergies:   Monosodium glutamate, Other medication, and Venom-yellow jacket   TREATMENT PLAN:  Effective Dates: 3/06/22/ TO 5/15/2022 (60 days). Frequency/Duration: 1x/week for 60 days MEDICAL/REFERRING DIAGNOSIS:  Malignant neoplasm of prostate [C61]   DATE OF ONSET: December   REFERRING PHYSICIAN: Marla Bella MD MD Orders: evaluate and treat  Return MD Appointment: March       DISCHARGE: DISCHARGE SUMMARY: Mr. Karen Carter has been seen in skilled PT from 3/06/22 to 22. Patient has attended 11 out of 12 scheduled visits, with 1 cancellation(s) and 0 no shows. Treatment has emphasized pelvic floor muscle coordination, strength, and endurance, bladder health, body mechanics training, and lumbar mobility. Patient responded well to therapy, with improvements in urinary control and management of LBP. Pt has achieved goals as indicated below and all questions have been answered to their satisfaction. Pt was invited to call with any further questions or concerns as needed. REASSESSMENT: Mr. Karen Carter has been seen in skilled PT from 1/10/22 to 3/15/22 Patient has attended 7 out of 8 scheduled visits, with 1 cancellation(s) and 0 no shows. Treatment has emphasized pelvic floor muscle coordination, strength, and endurance, bladder health, body mechanics training, and lumbar mobility.  Patient has made improvements in PFM coordination and strength resulting in a reduction in urinary incontinence, however continues to demonstrate deficits in controlling urinary incontinence with performing his job duties, particularly at the end of the day as he is fatigued. He also reports low back pain with performing his job and shows limited lumbar flexion, extension, and rotation range of motion. Pt has progressed with goals as indicated below at this point and will continue to benefit from skilled PT in order to achieve remaining goals and maximize functional outcomes in order to return to [facility]. INITIAL ASSESSMENT:  Mr. Aj Gamboa presents with decreased strength and endurance of pelvic floor muscles and increased tenderness and reduced connective tissue through his abdominal wall post operative RALP. Today he was educated on bladder health, kegel exercises, pelvic floor anatomy and role of musculature, and precautions with completion of strenuous activities. He required verbal cuing for proper activation and isolation of pelvic floor muscles. He was able to contract muscle for 10 seconds with minimal breath holding and accessory muscle use of gluteals. He was given kegel exercises to do at home to assist in maintaining continence. He was able to demonstrate improved coordination by the end of the session today. He will continue to benefit from skilled physical therapy to address above mentioned deficits and restore normal PFM function post operatively to minimize urinary incontinence. PROBLEM LIST (Impacting functional limitations):  1. Decreased Strength  2. Decreased ADL/Functional Activities  3. Decreased Activity Tolerance  4. Decreased Flexibility/Joint Mobility  5. Decreased Otto with Home Exercise Program  6. Decreased strength of pelvic floor which limits bladder control INTERVENTIONS PLANNED:  1. Neuromuscular re-education  2. Biofeedback as needed  3. HEP  4. Bladder retraining  5. Bladder education  6. Manual Therapy  7. Therapeutic Activites  8.  Therapeutic Exercise/Strengthening     GOALS: (Goals have been discussed and agreed upon with patient.)  Short Term Functional Goals: Time frame: 4 weeks  1. Pt will demonstrate independence with basic PFM HEP to improve awareness, coordination, and timing of PFM. GOAL MET  2. Pt will demonstrate understanding of and ability to teach back appropriate water intake, bladder irritants, toileting frequency, and positioning for improved self-management of symptoms. BOYD MET  3. Pt will demonstrate ability to isolate a pelvic floor contraction without breath holding and minimal to no accessory muscle use in order to implement the knack and/or urge suppression, reducing pad usage by 50%. GOAL MET  4. Pt will demonstrate ability to perform diaphragmatic breathing and quick flick pelvic floor contractions in order to implement urge suppression and reduce episodes of UI by 50%. GOAL MET  5. Pt will demonstrate ability to perform a coordinate PFM and TA contraction during exhalation for improve coordination with functional tasks such as lifting, bending, transitioning in/out of chairs. GOAL MET  Discharge Goals: Time frame: 8 weeks  1. Pt will demonstrate ability to voluntarily contract the pelvic floor muscles prior to a cough or sneeze for decreased leakage during increases in intra-abdominal pressure. GOAL MET  2. 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 GOAL MET  3. Pt will demonstrate independence with an advanced HEP for general conditioning, core stabilization, and mobility to facilitate carry over and independent management of symptoms. WORKING TOWARDS  4. Pt will be independent in implementation of a timed voiding schedule and use of urge suppression to reduce urinary frequency to 8-10/day and 2/night. GOAL MET  5. Pt will no longer require use of pads. Pt working towards. He is now using 1 PPD.        Outcome Measure:   NIH: pain: 0, Urinary Symptoms 5, Quality of Life: 7    NIH at Re-Assessment: pain 0, Urinary Symptoms 4, Quality of Life 2    NIH at Discharge: Pain 0, Urinary Symptoms 4, Quality of Life 2              PAIN/SUBJECTIVE:   Initial:   /10 Post Session:  /10      HISTORY:   PPD at discharge 0-1    History of Present Injury/Illness (Reason for Referral):  Pt is a 74 yo male referred to pelvic floor physical therapy (PFPT) 2/2 prostate cancer by Nany Robles MD. Pt is currently 4 weeks post-operative RALP and hernia repair. He reports intermittent left inguinal discomfort. Described as sharp when it occurs. Not always associated with a transitional movements. Denies perineal discomfort. Denies dysuria. Denies urinary hesitancy. Intermittent discomfort with BM's. He is still taking a stool softener but needing to strain and groan to evacuate. Pt has had 3 UTI's since the procedure was completed. Cath was in for 10 days. 7 days after the procedure he had to be admitted to the hospital for 4 days with the UTI. Returned to work 3 weeks ago. He works in construction/tile. Urinary: Frequency 13+x/day, hourly and waking up with strong urinary urgency. The last 3 nights he slept without needing a diaper. Pre surgery he was waking up 3x/night. Positive for stress urinary incontinence. Positive for JEREMIAH with coughing, sneezing, lifting, transitioning. Negative for urinary urgency, incomplete emptying, urinary hesitancy/intermittency, dysuria and hematuria. Pt does use pads for protection; 1 diapers per day (PPD). Sexual: unable to have erection at this time. Fluid intake: 2 8 oz water/day; bladder irritants include: milk, 1 cup of coffee in am, beer. Other medical history: Back pain - currently managed with chiropractic care (30 years).        Past Medical History/Comorbidities:   Mr. La Barreto  has a past medical history of Arthritis, Cancer ([address] Chronic pain, Emphysema lung ([address] (2021), Former smoker, stopped smoking in distant past, GERD (gastroesophageal reflux disease), Hypertension, and Sleep disorder. Mr. Daniel Ortiz  has a past surgical history that includes hx colonoscopy (~2007); hx wisdom teeth extraction; hx urological; and hx endoscopy. Social History/Living Environment:   Pt lives with self  Alcohol use? Intermittent   Tobacco use? Quit smoking 11 years ago  Sexual abuse? no    Prior Level of Function/Work/Activity:  Prior level of function: WFL  Occupation: Construction/tile  Exercise activities: Walking,     Personal Factors:          Sex:  male     Ambulatory/Rehab Services H2 Model Falls Risk Assessment    Risk Factors:       No Risk Factors Identified Ability to Rise from Chair:       (0)  Ability to rise in a single movement    Falls Prevention Plan:       No modifications necessary   Total: (5 or greater = High Risk): 0    ©2010 [employer] [facility] [employer] [address] [place] [address] States Patent #0,344,285. Federal Law prohibits the replication, distribution or use without written permission from [facility] [employer]     Current Medications:    Current Outpatient Medications:     cephALEXin (KEFLEX) 500 mg capsule, Take 1 Capsule by mouth four (4) times daily. , Disp: 12 Capsule, Rfl: 0    senna-docusate (PERICOLACE) 8.6-50 mg per tablet, Take 1 Tablet by mouth daily. , Disp: 30 Tablet, Rfl: 0    ondansetron (ZOFRAN ODT) 4 mg disintegrating tablet, Take 1 Tablet by mouth every eight (8) hours as needed for Nausea or Vomiting., Disp: 30 Tablet, Rfl: 0    ZINC PO, Take  by mouth daily. , Disp: , Rfl:     OTHER, 6 Capsules daily. Balance of nature., Disp: , Rfl:     omeprazole (PRILOSEC) 40 mg capsule, Take 40 mg by mouth daily. , Disp: , Rfl:     famotidine (PEPCID) 40 mg tablet, Take 40 mg by mouth nightly., Disp: , Rfl:     cyclobenzaprine (FLEXERIL) 5 mg tablet, Take 5 mg by mouth as needed. , Disp: , Rfl:     traMADoL (ULTRAM) 50 mg tablet, Take 50 mg by mouth every eight (8) hours as needed. , Disp: , Rfl:     losartan (COZAAR) 100 mg tablet, Take 100 mg by mouth daily. , Disp: , Rfl:     fluticasone propionate (FLONASE) 50 mcg/actuation nasal spray, 2 Sprays by Both Nostrils route daily as needed. , Disp: , Rfl:     finasteride (PROSCAR) 5 mg tablet, Take 1 Tablet by mouth daily. (Patient taking differently: Take 5 mg by mouth Every morning.), Disp: 90 Tablet, Rfl: 4    LORazepam (ATIVAN) 1 mg tablet, Take 1 mg by mouth nightly., Disp: , Rfl:     tamsulosin (FLOMAX) 0.4 mg capsule, Take 1 Cap by mouth daily. , Disp: 90 Cap, Rfl: 4   Date Last Reviewed:  4/18/2022   Number of Personal Factors/Comorbidities that affect the Plan of Care: 1-2: MODERATE COMPLEXITY   EXAMINATION:   External Observation:   · Voluntary Contraction: present  · Voluntary Relaxation: present  · Involuntary Contraction: present  · Involuntary Relaxation: present   · Lumbar ROM: Limited flexion, extension, and rotation (bilaterally). SEMG evaluation: Date 4/18/22  Position: side-lying, Electrode type: sEMG  sEMG biofeedback:   PFM resting <1.0 mV   15 mV at 10 sec hold x 20 reps  Quick flicks to >24 mV    Quality of:    Recruitment: [] slow     [] fair     [x] good   Derecruitment: [] slow     [] fair     [x] good   Holding: [] slow     [] fair     [x] good   Stability of hold: [] slow     [] fair     [x] good   Stability of rest: [] slow     [] fair     [x] good   Baseline b/t contract: [] slow     [] fair     [x] good   Overflow: [x] absent     [] min     [] mod     [] severe abdominals         Quick flicks well coordinated. External palpation:  Muscle/muscle group Tender?    Adductors [] Right  [] Left  []N/T   Gluteals [] Right  [] Left  []N/T   Piriformis [] Right  [] Left  []N/T   Iliopsoas [] Right  [] Left  []N/T   Abdominal wall [] Right  [] Left  []N/T     Pubic symphysis [] Right  [] Left  []N/T   Erector spinae Hypertrophied, tender to palpation     Breath assessment:  Observation: chest breathing dominant and A/P chest expansion  Coordination of pelvic floor muscles with breath: coordination intact.    Co-contraction of PFM with transversus abdominis: present             Rachelle Valentin PT, DPT, [facility]

## 2022-04-19 LAB — PSA SERPL DL<=0.01 NG/ML-MCNC: 0.02 NG/ML (ref 0–4)

## 2022-06-29 ENCOUNTER — APPOINTMENT (OUTPATIENT)
Dept: GENERAL RADIOLOGY | Age: 67
End: 2022-06-29
Payer: MEDICARE

## 2022-06-29 ENCOUNTER — HOSPITAL ENCOUNTER (EMERGENCY)
Age: 67
Discharge: HOME | End: 2022-06-29
Attending: EMERGENCY MEDICINE
Payer: MEDICARE

## 2022-06-29 VITALS
DIASTOLIC BLOOD PRESSURE: 88 MMHG | BODY MASS INDEX: 25.2 KG/M2 | SYSTOLIC BLOOD PRESSURE: 140 MMHG | TEMPERATURE: 97.9 F | OXYGEN SATURATION: 98 % | HEIGHT: 71 IN | RESPIRATION RATE: 16 BRPM | WEIGHT: 180 LBS | HEART RATE: 94 BPM
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
## 2022-06-29 DIAGNOSIS — S90.862A INSECT BITE OF LEFT FOOT, INITIAL ENCOUNTER: Primary | ICD-10-CM

## 2022-06-29 DIAGNOSIS — M79.672 LEFT FOOT PAIN: ICD-10-CM

## 2022-06-29 PROCEDURE — 6370000000 HC RX 637 (ALT 250 FOR IP): Performed by: NURSE PRACTITIONER

## 2022-06-29 PROCEDURE — 6360000002 HC RX W HCPCS: Performed by: NURSE PRACTITIONER

## 2022-06-29 PROCEDURE — 73630 X-RAY EXAM OF FOOT: CPT

## 2022-06-29 PROCEDURE — 99283 EMERGENCY DEPT VISIT LOW MDM: CPT

## 2022-06-29 RX ORDER — PREDNISONE 10 MG/1
40 TABLET ORAL DAILY
Qty: 16 TABLET | Refills: 0 | Status: SHIPPED | OUTPATIENT
Start: 2022-06-29 | End: 2022-07-03

## 2022-06-29 RX ORDER — DIPHENHYDRAMINE HCL 25 MG
50 CAPSULE ORAL
Status: COMPLETED | OUTPATIENT
Start: 2022-06-29 | End: 2022-06-29

## 2022-06-29 RX ORDER — EPINEPHRINE 0.3 MG/.3ML
0.3 INJECTION SUBCUTANEOUS ONCE
Qty: 0.3 ML | Refills: 1 | Status: SHIPPED | OUTPATIENT
Start: 2022-06-29 | End: 2022-06-29

## 2022-06-29 RX ORDER — DEXAMETHASONE SODIUM PHOSPHATE 10 MG/ML
10 INJECTION INTRAMUSCULAR; INTRAVENOUS
Status: COMPLETED | OUTPATIENT
Start: 2022-06-29 | End: 2022-06-29

## 2022-06-29 RX ORDER — IBUPROFEN 400 MG/1
400 TABLET ORAL EVERY 6 HOURS PRN
Status: DISCONTINUED | OUTPATIENT
Start: 2022-06-29 | End: 2022-06-29 | Stop reason: HOSPADM

## 2022-06-29 RX ORDER — DIPHENHYDRAMINE HCL 25 MG
25 CAPSULE ORAL EVERY 6 HOURS PRN
Qty: 16 CAPSULE | Refills: 0 | Status: SHIPPED | OUTPATIENT
Start: 2022-06-29 | End: 2022-07-03

## 2022-06-29 RX ADMIN — DIPHENHYDRAMINE HYDROCHLORIDE 50 MG: 25 CAPSULE ORAL at 18:12

## 2022-06-29 RX ADMIN — DEXAMETHASONE SODIUM PHOSPHATE 10 MG: 10 INJECTION INTRAMUSCULAR; INTRAVENOUS at 19:09

## 2022-06-29 RX ADMIN — IBUPROFEN 400 MG: 400 TABLET, FILM COATED ORAL at 18:12

## 2022-06-29 ASSESSMENT — LIFESTYLE VARIABLES
HOW MANY STANDARD DRINKS CONTAINING ALCOHOL DO YOU HAVE ON A TYPICAL DAY: 1 OR 2
HOW OFTEN DO YOU HAVE A DRINK CONTAINING ALCOHOL: 2-3 TIMES A WEEK

## 2022-06-29 ASSESSMENT — PAIN DESCRIPTION - LOCATION: LOCATION: FOOT

## 2022-06-29 ASSESSMENT — PAIN SCALES - GENERAL: PAINLEVEL_OUTOF10: 6

## 2022-06-29 NOTE — ED NOTES
I have reviewed discharge instructions with the patient. The patient verbalized understanding. Patient left ED via Discharge Method: wheelchair to Home with (Self). Opportunity for questions and clarification provided. Patient given 3 scripts. To continue your aftercare when you leave the hospital, you may receive an automated call from our care team to check in on how you are doing. This is a free service and part of our promise to provide the best care and service to meet your aftercare needs.  If you have questions, or wish to unsubscribe from this service please call [phone]. Thank you for Choosing our [address] [place] [address] Emergency Department.       Jonell Duverney, RN  06/29/22 [id number]

## 2022-06-29 NOTE — ED TRIAGE NOTES
Patient arrives ambulatory with mask in place. Patient complains of left foot pain with numbness after being bit bu something. Patient states he was walking barefoot up his steps when something bit hi. He denies seeing what. Presents with swollen left foot that itches and has numbness.

## 2022-06-29 NOTE — ED PROVIDER NOTES
[facility] Emergency Department Provider Note                   PCP:                No primary care provider on file. Age: 78 y.o. Sex: male       ICD-10-CM    1. Insect bite of left foot, initial encounter  [id number]     W57. XXXA    2. Left foot pain  M79.672        DISPOSITION         Discharge Medication List as of 6/29/2022  7:23 PM      START taking these medications    Details   EPINEPHrine (EPIPEN 2-ARISTEO) 0.3 MG/0.3ML SOAJ injection Inject 0.3 mLs into the muscle once for 1 dose Use as directed for allergic reaction, Disp-0.3 mL, R-1Print      diphenhydrAMINE (BENADRYL ALLERGY) 25 MG capsule Take 1 capsule by mouth every 6 hours as needed for Itching or Allergies, Disp-16 capsule, R-0Print      predniSONE (DELTASONE) 10 MG tablet Take 4 tablets by mouth daily for 4 days, Disp-16 tablet, R-0Print             Orders Placed This Encounter   Procedures    XR FOOT LEFT (MIN 3 VIEWS)        Lluvia Garcia NP, APRN - CNP 5:43 PM      MDM  Number of Diagnoses or Management Options  69-year-old male reports left foot pain that was sharp, began when he was walking his stairs to his home. Was not wearing shoes. States he felt as if something stung him on the bottom of his foot, but he states he immediately checked and confined nothing around that could have stung or bitten him. There is no visualized wound or lesion. He is weightbearing, but it hurts him to do so. He has tenderness and mild swelling at the plantar forefoot left. With no other abnormal finding no other tenderness, no wound or lesion, no rash. Denies lip/tongue/face/mouth/throat swelling, wheezing, shortness of breath, chest tightness, nausea vomiting, dizziness, syncope, diarrhea, pruritus or other complaints. 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   States has been stung by hornets yellow jackets multiple times in the past few years without any adverse effect. He has seen no hornets or similar insects today. Analgesics charted also given some oral steroids, Benadryl in the case of this being an allergic response, obtained x-ray for injury, foreign body which will provide no acute process. Monitored closely and at length in the ED with no new or worsening symptoms. States that symptoms improved after medications. Have offered further management which she declines, requesting discharge home. Low clinical suspicion of septic joint, occult fracture, anaphylaxis, gout flare, other acute emergent process. May well be a musculoskeletal injury and perhaps an arthropod bite or sting. In abundance of caution will prescribe additional steroids, Benadryl as well as refill his EpiPen's. Advised on therapeutic measures given strict return precautions peer to follow-up with PCP in 1-2 days. Again offered to further manage and observe the patient which she declined stating that he lives 20 minutes away and like to go home now. Patient is well-hydrated appearing, no distress. Nontoxic-appearing, tolerating oral intake, hemodynamically stable. All findings and plan were discussed with the patient. All questions answered. Discussed with the patient that an unremarkable evaluation in the ED does not preclude the development or presence of a serious or life threatening condition. Patient was instructed to return immediately for any worsening or change in current symptoms, or if symptoms do not continue to improve. I instructed them to follow up with their primary care provider, own specialist, or medical provider that I am recommending for him within the next 2-3 days  The patient acknowledged understanding plan of care and affirmed approval.     Signed by: TITA No     This note created using Dragon voice recognition software.   Please excuse any accidental errors associated with its use, as note has not been fully proofread and edited. Camila Barker is a 78 y.o. male who presents to the Emergency Department with chief complaint of  No chief complaint on file. HPI  81-year-old male presents to the ED with complaint of left foot pain. States that approximately 1 hour prior to arrival, he was walking down his stairs, barefoot, when he felt a sharp pain in the plantar forefoot. States that he quickly looked at his foot and saw nothing that could have stung or bitten him. Described the pain as sharp. Saw no wound or puncture. Complains of pain at the site is made worse with palpation and walking, as well as itching at the site. Denies fall or other injury, radiation of pain, numbness/tingling/weakness, facial/tongue/lip/oral/throat swelling, shortness of breath, cough, dizziness, wheezing, stridor, abdominal pain, syncope, chest tightness and all other complaint. Review of Systems  Constitutional: Negative for fever. Negative for appetite change, chills, diaphoresis and unexpected weight change.     HENT: Negative     Eyes: Negative   Respiratory: Negative  Cardiovascular: Negative  Musculoskeletal: Negative   Skin: As in HPI  Allergic/Immunologic: Negative  Neurological: Negative                          Past Medical History:   Diagnosis Date    Arthritis     OA- knees     Cancer ([address]     prostate cancer    Chronic pain     back, hip and knee -  takes Tramadol    Emphysema lung ([address] 2021    \"very mild per scan\" no inhalers    Former smoker, stopped smoking in distant past     Quit 2011    GERD (gastroesophageal reflux disease)     omeprazole & pepcid daily - well controlled    Hypertension     losartan BP controlled     [facility]        Past Surgical History:   Procedure Laterality Date    COLONOSCOPY      2007    UPPER GASTROINTESTINAL ENDOSCOPY      UROLOGICAL SURGERY      prostate biopsy     WISDOM TOOTH EXTRACTION          No family history on file.        Social Connections:     Frequency of Communication with Friends and Family: Not on file    Frequency of Social Gatherings with Friends and Family: Not on file    Attends [employer] Services: Not on file    Active Member of Clubs or Organizations: Not on file    Attends Club or Organization Meetings: Not on file    Marital Status: Not on file        Allergies   Allergen Reactions    Monosodium Glutamate Nausea And Vomiting    Hornet Venom Hives     States no longer has reaction        Vitals signs and nursing note reviewed. Patient Vitals for the past 4 hrs:   Temp Pulse Resp BP SpO2   06/29/22 1738 97.9 °F (36.6 °C) 94 16 (!) 140/91 99 %          Physical Exam   Constitutional: Oriented to person, place, and time. Appears well-developed and well-nourished. No distress. HENT:    Head: Normocephalic and atraumatic   Right Ear: External ear normal.    Left Ear: External ear normal.     Nose: Nose normal.   Mouth/Throat: Mouth normal.    Eyes: Conjunctivae are normal.   Neck: Supple. No tracheal deviation. Cardiovascular: Normal rate, intact distal pulses. Brisk capillary refill intact, less than 2 seconds. Regular rhythm present. No pitting edema. Pulmonary/Chest: Lungs are clear & equal bilaterally. No adventitious sounds. No respiratory distress. Abdominal: Soft. There is no tenderness. Musculoskeletal: No obvious deformity, erythema, edema. Neurological: Alert and oriented to person, place, and time. No numbness/tingling. No loss of sensation. Positive PMS ×4. GCS= 15. Skin: No wound or lesion, mild swelling at the bottom of the left forefoot. There is no erythema, bleeding or drainage, no fluctuance, no crepitus skin is warm and dry. Capillary refill takes less than 2 seconds. No abrasion, no lesion, no petechiae and no rash noted. Not diaphoretic. No cyanosis, erythema, or pallor. Psychiatric: Normal mood and affect.  Behavior is normal.    Nursing note and vitals reviewed. Procedures      Labs Reviewed - No data to display     XR FOOT LEFT (MIN 3 VIEWS)   Final Result   Normal appearance of the left foot. No retained radiopaque foreign   body. Voice dictation software was used during the making of this note. This software is not perfect and grammatical and other typographical errors may be present. This note has not been completely proofread for errors.      YENNI Hicks - CNP  06/29/22 8712

## 2022-10-27 DIAGNOSIS — C61 MALIGNANT NEOPLASM OF PROSTATE (HCC): Primary | ICD-10-CM

## 2022-10-27 NOTE — PROGRESS NOTES
[facility]  [address] [facility], [address] [place] [address]  [phone]        Mr. Anshul Cutler is a 78 y.o. male with a diagnosis of prostate cancer. He is s/p RALP with BPLND on 11/2/21, GS 3+4, pT2N0, 0/27 nodes, -SM. Ebthanie [place] INTERVAL HISTORY: He is here today for follow-up of his prostate cancer. He has no complaints today. He has very little stress incontinence. He voids with a strong stream.    His PSA on 12/6/2021 was 0.013. On 4/18/2022 it was 0.020. He has a ultrasensitive PSA pending today. He continues to deal with his chronic back pain but has no other complaints today. From previous note:  He is here for follow-up on his prostate cancer. He has no complaints today. He states he is having very little urinary leakage. He wears a thin pad while working. He has a strong stream when he voids. He has his last pelvic floor physical therapy session later today. His PSA on 12/6/2021 was 0.013. He denies any hematuria or dysuria. From previous note:  He is doing well today. He was admitted approximately 2 weeks after the procedure for what was most likely a febrile urinary tract infection. His urine culture grew out a pansensitive E. coli. He was treated with antibiotics and has done well since. Of note his Donohue catheter was removed during that hospitalization. Also discussed his pathology with him in detail. He is wearing 2 pads daily. He is continent at night. He states he is voiding with a strong stream.         Past medical, family and social histories, as well as medications and allergies, were reviewed and updated in the medical record as appropriate.     PMH:     Past Medical History:   Diagnosis Date    Arthritis     OA- knees     Cancer ([facility]     prostate cancer    Chronic pain     back, hip and knee -  takes Tramadol    Emphysema lung ([facility] 2021    \"very mild per scan\" no inhalers    Former smoker, stopped smoking in distant past     Quit 2011    GERD (gastroesophageal reflux disease)     omeprazole & pepcid daily - well controlled    Hypertension     losartan BP controlled     Sleep disorder     ativan QHS       MEDs:     cephALEXin  cyclobenzaprine  famotidine  finasteride  fluticasone  LORazepam  losartan  omeprazole  ondansetron  senna-docusate  traMADol  ZINC PO     ALLERGIES:    Allergies   Allergen Reactions    Monosodium Glutamate Nausea And Vomiting    Hornet Venom Hives     States no longer has reaction       ROS:     Review of Systems   Constitutional: Negative. Negative for chills, fatigue and fever. Respiratory: Negative. Cardiovascular: Negative. Gastrointestinal: Negative. Genitourinary: Negative. Negative for difficulty urinating, dysuria, flank pain, frequency, hematuria and urgency. Musculoskeletal: Negative. All other systems reviewed and are negative. PHYSICAL EXAMINATION    /77   Pulse 87   Temp 98.1 °F (36.7 °C)   Resp 16   Ht 5' 9\" (1.753 m)   Wt 206 lb 6.4 oz (93.6 kg)   SpO2 99%   BMI 30.48 kg/m²   General: well dressed, well nourished, no acute distress  Skin: no rashes  HEENT: Sclera are clear,normocephalic, atraumatic. no external lesions   Cardiovascular: Reg. Normal perfusion  Respiratory: normal respiratory effort, no JVD, no audible wheezing. Musculoskeletal: unremarkable with normal function. No embolic signs or cyanosis. Neurologic exam: intact, no focal deficits, moves all 4 extremities  Psych: normal mood and affect, alert, oriented x 3  LE:  no edema  GI: soft, nontender, no masses, no CVA tenderness  : DEFERRED       LABORATORY RESULTS:    Lab Results   Component Value Date/Time    PSA 5.8 05/25/2021 01:20 PM    [id number] 0.020 04/18/2022 09:28 AM    [id number] 0.013 12/06/2021 11:26 AM            ASSESSMENT:    Mr. Jovani Morrow is a 78 y.o. male with a diagnosis of prostate cancer.      He is s/p RALP with BPLND on 11/2/21, GS 3+4, pT2N0, 0/27 nodes, -SM. He continues to do well. I told him we need to closely follow his PSA. I want to ensure that it is not increased today. If it has we will see him back sooner to further discuss postoperative radiation options. I also explained the potential for a PSMA PET scan should his PSA rise considerably. If it stable or lower I like to see him back in 6 months with a repeat ultrasensitive PSA. PLAN:   -upsa today   -rtc in 6 months with upsa  ________________________________________      I have seen and examined this patient. I have reviewed and edited the note started by the MA and agree with the outlined plan. Part of this note was written by using a voice dictation software. The note has been proof read but may still contain some grammatical/other typographical errors.       [facility]

## 2022-10-31 ENCOUNTER — OFFICE VISIT (OUTPATIENT)
Dept: ONCOLOGY | Age: 67
End: 2022-10-31
Payer: MEDICARE

## 2022-10-31 ENCOUNTER — HOSPITAL ENCOUNTER (OUTPATIENT)
Dept: LAB | Age: 67
Discharge: HOME | End: 2022-11-03
Payer: MEDICARE

## 2022-10-31 VITALS
WEIGHT: 206.4 LBS | SYSTOLIC BLOOD PRESSURE: 123 MMHG | BODY MASS INDEX: 30.57 KG/M2 | OXYGEN SATURATION: 99 % | DIASTOLIC BLOOD PRESSURE: 77 MMHG | HEIGHT: 69 IN | RESPIRATION RATE: 16 BRPM | HEART RATE: 87 BPM | TEMPERATURE: 98.1 F

## 2022-10-31 DIAGNOSIS — C61 MALIGNANT NEOPLASM OF PROSTATE (HCC): ICD-10-CM

## 2022-10-31 DIAGNOSIS — C61 PROSTATE CANCER (HCC): Primary | ICD-10-CM

## 2022-10-31 PROCEDURE — 3017F COLORECTAL CA SCREEN DOC REV: CPT | Performed by: UROLOGY

## 2022-10-31 PROCEDURE — 99213 OFFICE O/P EST LOW 20 MIN: CPT | Performed by: UROLOGY

## 2022-10-31 PROCEDURE — 1123F ACP DISCUSS/DSCN MKR DOCD: CPT | Performed by: UROLOGY

## 2022-10-31 PROCEDURE — G8427 DOCREV CUR MEDS BY ELIG CLIN: HCPCS | Performed by: UROLOGY

## 2022-10-31 PROCEDURE — 36415 COLL VENOUS BLD VENIPUNCTURE: CPT

## 2022-10-31 PROCEDURE — 1036F TOBACCO NON-USER: CPT | Performed by: UROLOGY

## 2022-10-31 PROCEDURE — 84153 ASSAY OF PSA TOTAL: CPT

## 2022-10-31 PROCEDURE — G8417 CALC BMI ABV UP PARAM F/U: HCPCS | Performed by: UROLOGY

## 2022-10-31 PROCEDURE — G8484 FLU IMMUNIZE NO ADMIN: HCPCS | Performed by: UROLOGY

## 2022-10-31 ASSESSMENT — ENCOUNTER SYMPTOMS
GASTROINTESTINAL NEGATIVE: 1
RESPIRATORY NEGATIVE: 1

## 2022-11-01 LAB — PSA SERPL DL<=0.01 NG/ML-MCNC: 0.02 NG/ML (ref 0–4)

## 2023-04-27 ENCOUNTER — APPOINTMENT (RX ONLY)
Dept: URBAN - METROPOLITAN AREA CLINIC 23 | Facility: CLINIC | Age: 68
Setting detail: DERMATOLOGY
End: 2023-04-27

## 2023-04-27 DIAGNOSIS — L57.8 OTHER SKIN CHANGES DUE TO CHRONIC EXPOSURE TO NONIONIZING RADIATION: ICD-10-CM

## 2023-04-27 DIAGNOSIS — L82.1 OTHER SEBORRHEIC KERATOSIS: ICD-10-CM

## 2023-04-27 DIAGNOSIS — Z85.828 PERSONAL HISTORY OF OTHER MALIGNANT NEOPLASM OF SKIN: ICD-10-CM

## 2023-04-27 DIAGNOSIS — L57.0 ACTINIC KERATOSIS: ICD-10-CM

## 2023-04-27 DIAGNOSIS — D485 NEOPLASM OF UNCERTAIN BEHAVIOR OF SKIN: ICD-10-CM

## 2023-04-27 PROBLEM — D48.5 NEOPLASM OF UNCERTAIN BEHAVIOR OF SKIN: Status: ACTIVE | Noted: 2023-04-27

## 2023-04-27 PROCEDURE — 17003 DESTRUCT PREMALG LES 2-14: CPT

## 2023-04-27 PROCEDURE — 17000 DESTRUCT PREMALG LESION: CPT | Mod: 59

## 2023-04-27 PROCEDURE — ? LIQUID NITROGEN

## 2023-04-27 PROCEDURE — 11102 TANGNTL BX SKIN SINGLE LES: CPT

## 2023-04-27 PROCEDURE — 99213 OFFICE O/P EST LOW 20 MIN: CPT | Mod: 25

## 2023-04-27 PROCEDURE — ? BIOPSY BY SHAVE METHOD

## 2023-04-27 PROCEDURE — ? COUNSELING

## 2023-04-27 ASSESSMENT — LOCATION SIMPLE DESCRIPTION DERM
LOCATION SIMPLE: RIGHT FOREARM
LOCATION SIMPLE: CHEST
LOCATION SIMPLE: RIGHT HAND
LOCATION SIMPLE: ABDOMEN
LOCATION SIMPLE: LEFT FOREARM
LOCATION SIMPLE: RIGHT CLAVICULAR SKIN
LOCATION SIMPLE: LEFT CLAVICULAR SKIN
LOCATION SIMPLE: POSTERIOR NECK

## 2023-04-27 ASSESSMENT — LOCATION DETAILED DESCRIPTION DERM
LOCATION DETAILED: LEFT CLAVICULAR SKIN
LOCATION DETAILED: LEFT PROXIMAL ULNAR DORSAL FOREARM
LOCATION DETAILED: RIGHT PROXIMAL RADIAL DORSAL FOREARM
LOCATION DETAILED: LEFT DISTAL ULNAR DORSAL FOREARM
LOCATION DETAILED: RIGHT PROXIMAL DORSAL FOREARM
LOCATION DETAILED: RIGHT RIB CAGE
LOCATION DETAILED: RIGHT MEDIAL TRAPEZIAL NECK
LOCATION DETAILED: LEFT PROXIMAL DORSAL FOREARM
LOCATION DETAILED: LEFT MEDIAL SUPERIOR CHEST
LOCATION DETAILED: LEFT DISTAL DORSAL FOREARM
LOCATION DETAILED: RIGHT DISTAL DORSAL FOREARM
LOCATION DETAILED: RIGHT CLAVICULAR SKIN
LOCATION DETAILED: RIGHT RADIAL DORSAL HAND

## 2023-04-27 ASSESSMENT — LOCATION ZONE DERM
LOCATION ZONE: NECK
LOCATION ZONE: ARM
LOCATION ZONE: TRUNK
LOCATION ZONE: HAND

## 2023-04-27 NOTE — PROCEDURE: BIOPSY BY SHAVE METHOD
Detail Level: Detailed
Depth Of Biopsy: dermis
Was A Bandage Applied: Yes
Size Of Lesion In Cm: 0
Biopsy Type: H and E
Biopsy Method: Dermablade
Anesthesia Type: 1% lidocaine with epinephrine
Anesthesia Volume In Cc: 0.5
Hemostasis: Drysol
Wound Care: Petrolatum
Dressing: bandage
Destruction After The Procedure: No
Type Of Destruction Used: Curettage
Curettage Text: The wound bed was treated with curettage after the biopsy was performed.
Cryotherapy Text: The wound bed was treated with cryotherapy after the biopsy was performed.
Electrodesiccation Text: The wound bed was treated with electrodesiccation after the biopsy was performed.
Electrodesiccation And Curettage Text: The wound bed was treated with electrodesiccation and curettage after the biopsy was performed.
Silver Nitrate Text: The wound bed was treated with silver nitrate after the biopsy was performed.
Lab: [id number]
Lab Facility: [id number]
Consent: Written consent was obtained and risks were reviewed including but not limited to scarring, infection, bleeding, scabbing, incomplete removal, nerve damage and allergy to anesthesia.
Post-Care Instructions: I reviewed with the patient in detail post-care instructions. Patient is to keep the biopsy site dry overnight, and then apply bacitracin twice daily until healed. Patient may apply hydrogen peroxide soaks to remove any crusting.
Notification Instructions: Patient will be notified of biopsy results. However, patient instructed to call the office if not contacted within 2 weeks.
Billing Type: Third-Party Bill
Information: Selecting Yes will display possible errors in your note based on the variables you have selected. This validation is only offered as a suggestion for you. PLEASE NOTE THAT THE VALIDATION TEXT WILL BE REMOVED WHEN YOU FINALIZE YOUR NOTE. IF YOU WANT TO FAX A PRELIMINARY NOTE YOU WILL NEED TO TOGGLE THIS TO 'NO' IF YOU DO NOT WANT IT IN YOUR FAXED NOTE.

## 2023-05-03 DIAGNOSIS — C61 PROSTATE CANCER (HCC): Primary | ICD-10-CM

## 2023-05-08 ENCOUNTER — HOSPITAL ENCOUNTER (OUTPATIENT)
Dept: LAB | Age: 68
Discharge: HOME | End: 2023-05-11
Payer: MEDICARE

## 2023-05-08 ENCOUNTER — OFFICE VISIT (OUTPATIENT)
Dept: ONCOLOGY | Age: 68
End: 2023-05-08
Payer: MEDICARE

## 2023-05-08 VITALS
HEIGHT: 69 IN | BODY MASS INDEX: 29.9 KG/M2 | SYSTOLIC BLOOD PRESSURE: 108 MMHG | WEIGHT: 201.9 LBS | RESPIRATION RATE: 14 BRPM | TEMPERATURE: 97.8 F | OXYGEN SATURATION: 97 % | HEART RATE: 80 BPM | DIASTOLIC BLOOD PRESSURE: 84 MMHG

## 2023-05-08 DIAGNOSIS — C61 PROSTATE CANCER (HCC): Primary | ICD-10-CM

## 2023-05-08 DIAGNOSIS — C61 PROSTATE CANCER (HCC): ICD-10-CM

## 2023-05-08 PROCEDURE — 84153 ASSAY OF PSA TOTAL: CPT

## 2023-05-08 PROCEDURE — 99213 OFFICE O/P EST LOW 20 MIN: CPT | Performed by: UROLOGY

## 2023-05-08 PROCEDURE — G8417 CALC BMI ABV UP PARAM F/U: HCPCS | Performed by: UROLOGY

## 2023-05-08 PROCEDURE — 1036F TOBACCO NON-USER: CPT | Performed by: UROLOGY

## 2023-05-08 PROCEDURE — G8427 DOCREV CUR MEDS BY ELIG CLIN: HCPCS | Performed by: UROLOGY

## 2023-05-08 PROCEDURE — 36415 COLL VENOUS BLD VENIPUNCTURE: CPT

## 2023-05-08 PROCEDURE — 3017F COLORECTAL CA SCREEN DOC REV: CPT | Performed by: UROLOGY

## 2023-05-08 PROCEDURE — 1123F ACP DISCUSS/DSCN MKR DOCD: CPT | Performed by: UROLOGY

## 2023-05-08 RX ORDER — HYDROCODONE BITARTRATE AND ACETAMINOPHEN 7.5; 325 MG/1; MG/1
1 TABLET ORAL EVERY 8 HOURS PRN
COMMUNITY
Start: 2023-04-13

## 2023-05-08 ASSESSMENT — PATIENT HEALTH QUESTIONNAIRE - PHQ9
2. FEELING DOWN, DEPRESSED OR HOPELESS: 0
SUM OF ALL RESPONSES TO PHQ QUESTIONS 1-9: 0
SUM OF ALL RESPONSES TO PHQ9 QUESTIONS 1 & 2: 0
SUM OF ALL RESPONSES TO PHQ QUESTIONS 1-9: 0
1. LITTLE INTEREST OR PLEASURE IN DOING THINGS: 0

## 2023-05-08 ASSESSMENT — ENCOUNTER SYMPTOMS
RESPIRATORY NEGATIVE: 1
GASTROINTESTINAL NEGATIVE: 1

## 2023-05-09 ENCOUNTER — TELEPHONE (OUTPATIENT)
Dept: ONCOLOGY | Age: 68
End: 2023-05-09

## 2023-05-09 LAB — PSA SERPL DL<=0.01 NG/ML-MCNC: 0.03 NG/ML (ref 0–4)

## 2023-05-18 ENCOUNTER — APPOINTMENT (RX ONLY)
Dept: URBAN - METROPOLITAN AREA CLINIC 23 | Facility: CLINIC | Age: 68
Setting detail: DERMATOLOGY
End: 2023-05-18

## 2023-05-18 PROBLEM — D04.5 CARCINOMA IN SITU OF SKIN OF TRUNK: Status: ACTIVE | Noted: 2023-05-18

## 2023-05-18 PROCEDURE — 11602 EXC TR-EXT MAL+MARG 1.1-2 CM: CPT

## 2023-05-18 PROCEDURE — ? EXCISION

## 2023-05-18 PROCEDURE — 12032 INTMD RPR S/A/T/EXT 2.6-7.5: CPT

## 2023-05-18 NOTE — PROCEDURE: EXCISION
Biopsy Photograph Reviewed: Yes
Size Of Lesion In Cm: 1.1
X Size Of Lesion In Cm (Optional): 0
Size Of Margin In Cm: 0.4
Was An Eye Clamp Used?: No
Eye Clamp Note Details: An eye clamp was used during the procedure.
Excision Method: Elliptical
Saucerization Depth: dermis and superficial adipose tissue
Repair Type: Intermediate
Suturegard Retention Suture: 2-0 Nylon
Retention Suture Bite Size: 3 mm
Length To Time In Minutes Device Was In Place: 10
Number Of Hemigard Strips Per Side: 1
Intermediate / Complex Repair - Final Wound Length In Cm: 4.5
Undermining Type: Entire Wound
Debridement Text: The wound edges were debrided prior to proceeding with the closure to facilitate wound healing.
Helical Rim Text: The closure involved the helical rim.
Vermilion Border Text: The closure involved the vermilion border.
Nostril Rim Text: The closure involved the nostril rim.
Retention Suture Text: Retention sutures were placed to support the closure and prevent dehiscence.
Suture Removal: 14 days
Lab: [id number]
Lab Facility: [id number]
Graft Donor Site Bandage (Optional-Leave Blank If You Don't Want In Note): Steri-strips and a pressure bandage were applied to the donor site.
Epidermal Closure Graft Donor Site (Optional): simple interrupted
Billing Type: Third-Party Bill
Excision Depth: adipose tissue
Scalpel Size: 15 blade
Anesthesia Type: 1% lidocaine with epinephrine
Additional Anesthesia Volume In Cc: 6
Hemostasis: Electrocautery
Estimated Blood Loss (Cc): minimal
Detail Level: Detailed
Deep Sutures: 4-0 Vicryl
Epidermal Sutures: 4-0 Prolene
Wound Care: Petrolatum
Dressing: dry sterile dressing
Suturegard Intro: Intraoperative tissue expansion was performed, utilizing the SUTUREGARD device, in order to reduce wound tension.
Suturegard Body: The suture ends were repeatedly re-tightened and re-clamped to achieve the desired tissue expansion.
Hemigard Intro: Due to skin fragility and wound tension, it was decided to use HEMIGARD adhesive retention suture devices to permit a linear closure. The skin was cleaned and dried for a 6cm distance away from the wound. Excessive hair, if present, was removed to allow for adhesion.
Hemigard Postcare Instructions: The HEMIGARD strips are to remain completely dry for at least 5-7 days.
Positioning (Leave Blank If You Do Not Want): The patient was placed in a comfortable position exposing the surgical site.
Pre-Excision Curettage Text (Leave Blank If You Do Not Want): Prior to drawing the surgical margin the visible lesion was removed with electrodesiccation and curettage to clearly define the lesion size.
Complex Repair Preamble Text (Leave Blank If You Do Not Want): Extensive wide undermining was performed.
Intermediate Repair Preamble Text (Leave Blank If You Do Not Want): Undermining was performed with blunt dissection.
Curvilinear Excision Additional Text (Leave Blank If You Do Not Want): The margin was drawn around the clinically apparent lesion.  A curvilinear shape was then drawn on the skin incorporating the lesion and margins.  Incisions were then made along these lines to the appropriate tissue plane and the lesion was extirpated.
Fusiform Excision Additional Text (Leave Blank If You Do Not Want): The margin was drawn around the clinically apparent lesion.  A fusiform shape was then drawn on the skin incorporating the lesion and margins.  Incisions were then made along these lines to the appropriate tissue plane and the lesion was extirpated.
Elliptical Excision Additional Text (Leave Blank If You Do Not Want): The margin was drawn around the clinically apparent lesion.  An elliptical shape was then drawn on the skin incorporating the lesion and margins.  Incisions were then made along these lines to the appropriate tissue plane and the lesion was extirpated.
Saucerization Excision Additional Text (Leave Blank If You Do Not Want): The margin was drawn around the clinically apparent lesion.  Incisions were then made along these lines, in a tangential fashion, to the appropriate tissue plane and the lesion was extirpated.
Slit Excision Additional Text (Leave Blank If You Do Not Want): A linear line was drawn on the skin overlying the lesion. An incision was made slowly until the lesion was visualized.  Once visualized, the lesion was removed with blunt dissection.
Excisional Biopsy Additional Text (Leave Blank If You Do Not Want): The margin was drawn around the clinically apparent lesion. An elliptical shape was then drawn on the skin incorporating the lesion and margins.  Incisions were then made along these lines to the appropriate tissue plane and the lesion was extirpated.
Perilesional Excision Additional Text (Leave Blank If You Do Not Want): The margin was drawn around the clinically apparent lesion. Incisions were then made along these lines to the appropriate tissue plane and the lesion was extirpated.
Repair Performed By Another Provider Text (Leave Blank If You Do Not Want): After the tissue was excised the defect was repaired by another provider.
No Repair - Repaired With Adjacent Surgical Defect Text (Leave Blank If You Do Not Want): After the excision the defect was repaired concurrently with another surgical defect which was in close approximation.
Adjacent Tissue Transfer Text: The defect edges were debeveled with a #15 scalpel blade. Given the location of the defect and the proximity to free margins an adjacent tissue transfer was deemed most appropriate. Using a sterile surgical marker, an appropriate flap was drawn incorporating the defect and placing the expected incisions within the relaxed skin tension lines where possible. The area thus outlined was incised deep to adipose tissue with a #15 scalpel blade. The skin margins were undermined to an appropriate distance in all directions utilizing iris scissors and carried over to close the primary defect.
Advancement Flap (Single) Text: The defect edges were debeveled with a #15 scalpel blade. Given the location of the defect and the proximity to free margins a single advancement flap was deemed most appropriate. Using a sterile surgical marker, an appropriate advancement flap was drawn incorporating the defect and placing the expected incisions within the relaxed skin tension lines where possible. The area thus outlined was incised deep to adipose tissue with a #15 scalpel blade. The skin margins were undermined to an appropriate distance in all directions utilizing iris scissors. Following this, the designed flap was advanced and carried over into the primary defect and sutured into place.
Advancement Flap (Double) Text: The defect edges were debeveled with a #15 scalpel blade. Given the location of the defect and the proximity to free margins a double advancement flap was deemed most appropriate. Using a sterile surgical marker, the appropriate advancement flaps were drawn incorporating the defect and placing the expected incisions within the relaxed skin tension lines where possible. The area thus outlined was incised deep to adipose tissue with a #15 scalpel blade. The skin margins were undermined to an appropriate distance in all directions utilizing iris scissors. Following this, the designed flaps were advanced and carried over into the primary defect and sutured into place.
Burow's Advancement Flap Text: The defect edges were debeveled with a #15 scalpel blade. Given the location of the defect and the proximity to free margins a Burow's advancement flap was deemed most appropriate. Using a sterile surgical marker, the appropriate advancement flap was drawn incorporating the defect and placing the expected incisions within the relaxed skin tension lines where possible. The area thus outlined was incised deep to adipose tissue with a #15 scalpel blade. The skin margins were undermined to an appropriate distance in all directions utilizing iris scissors. Following this, the designed flap was advanced and carried over into the primary defect and sutured into place.
Chonodrocutaneous Helical Advancement Flap Text: The defect edges were debeveled with a #15 scalpel blade. Given the location of the defect and the proximity to free margins a chondrocutaneous helical advancement flap was deemed most appropriate. Using a sterile surgical marker, the appropriate advancement flap was drawn incorporating the defect and placing the expected incisions within the relaxed skin tension lines where possible. The area thus outlined was incised deep to adipose tissue with a #15 scalpel blade. The skin margins were undermined to an appropriate distance in all directions utilizing iris scissors. Following this, the designed flap was advanced and carried over into the primary defect and sutured into place.
Crescentic Advancement Flap Text: The defect edges were debeveled with a #15 scalpel blade. Given the location of the defect and the proximity to free margins a crescentic advancement flap was deemed most appropriate. Using a sterile surgical marker, the appropriate advancement flap was drawn incorporating the defect and placing the expected incisions within the relaxed skin tension lines where possible. The area thus outlined was incised deep to adipose tissue with a #15 scalpel blade. The skin margins were undermined to an appropriate distance in all directions utilizing iris scissors. Following this, the designed flap was advanced and carried over into the primary defect and sutured into place.
A-T Advancement Flap Text: The defect edges were debeveled with a #15 scalpel blade. Given the location of the defect, shape of the defect and the proximity to free margins an A-T advancement flap was deemed most appropriate. Using a sterile surgical marker, an appropriate advancement flap was drawn incorporating the defect and placing the expected incisions within the relaxed skin tension lines where possible. The area thus outlined was incised deep to adipose tissue with a #15 scalpel blade. The skin margins were undermined to an appropriate distance in all directions utilizing iris scissors. Following this, the designed flap was advanced and carried over into the primary defect and sutured into place.
O-T Advancement Flap Text: The defect edges were debeveled with a #15 scalpel blade. Given the location of the defect, shape of the defect and the proximity to free margins an O-T advancement flap was deemed most appropriate. Using a sterile surgical marker, an appropriate advancement flap was drawn incorporating the defect and placing the expected incisions within the relaxed skin tension lines where possible. The area thus outlined was incised deep to adipose tissue with a #15 scalpel blade. The skin margins were undermined to an appropriate distance in all directions utilizing iris scissors. Following this, the designed flap was advanced and carried over into the primary defect and sutured into place.
O-L Flap Text: The defect edges were debeveled with a #15 scalpel blade. Given the location of the defect, shape of the defect and the proximity to free margins an O-L flap was deemed most appropriate. Using a sterile surgical marker, an appropriate advancement flap was drawn incorporating the defect and placing the expected incisions within the relaxed skin tension lines where possible. The area thus outlined was incised deep to adipose tissue with a #15 scalpel blade. The skin margins were undermined to an appropriate distance in all directions utilizing iris scissors. Following this, the designed flap was advanced and carried over into the primary defect and sutured into place.
O-Z Flap Text: The defect edges were debeveled with a #15 scalpel blade. Given the location of the defect, shape of the defect and the proximity to free margins an O-Z flap was deemed most appropriate. Using a sterile surgical marker, an appropriate transposition flap was drawn incorporating the defect and placing the expected incisions within the relaxed skin tension lines where possible. The area thus outlined was incised deep to adipose tissue with a #15 scalpel blade. The skin margins were undermined to an appropriate distance in all directions utilizing iris scissors. Following this, the designed flap was carried over into the primary defect and sutured into place.
Double O-Z Flap Text: The defect edges were debeveled with a #15 scalpel blade. Given the location of the defect, shape of the defect and the proximity to free margins a Double O-Z flap was deemed most appropriate. Using a sterile surgical marker, an appropriate transposition flap was drawn incorporating the defect and placing the expected incisions within the relaxed skin tension lines where possible. The area thus outlined was incised deep to adipose tissue with a #15 scalpel blade. The skin margins were undermined to an appropriate distance in all directions utilizing iris scissors. Following this, the designed flap was carried over into the primary defect and sutured into place.
V-Y Flap Text: The defect edges were debeveled with a #15 scalpel blade. Given the location of the defect, shape of the defect and the proximity to free margins a V-Y flap was deemed most appropriate. Using a sterile surgical marker, an appropriate advancement flap was drawn incorporating the defect and placing the expected incisions within the relaxed skin tension lines where possible. The area thus outlined was incised deep to adipose tissue with a #15 scalpel blade. The skin margins were undermined to an appropriate distance in all directions utilizing iris scissors. Following this, the designed flap was advanced and carried over into the primary defect and sutured into place.
Advancement-Rotation Flap Text: The defect edges were debeveled with a #15 scalpel blade. Given the location of the defect, shape of the defect and the proximity to free margins an advancement-rotation flap was deemed most appropriate. Using a sterile surgical marker, an appropriate flap was drawn incorporating the defect and placing the expected incisions within the relaxed skin tension lines where possible. The area thus outlined was incised deep to adipose tissue with a #15 scalpel blade. The skin margins were undermined to an appropriate distance in all directions utilizing iris scissors. Following this, the designed flap was carried over into the primary defect and sutured into place.
Mercedes Flap Text: The defect edges were debeveled with a #15 scalpel blade. Given the location of the defect, shape of the defect and the proximity to free margins a Mercedes flap was deemed most appropriate. Using a sterile surgical marker, an appropriate advancement flap was drawn incorporating the defect and placing the expected incisions within the relaxed skin tension lines where possible. The area thus outlined was incised deep to adipose tissue with a #15 scalpel blade. The skin margins were undermined to an appropriate distance in all directions utilizing iris scissors. Following this, the designed flap was advanced and carried over into the primary defect and sutured into place.
Modified Advancement Flap Text: The defect edges were debeveled with a #15 scalpel blade. Given the location of the defect, shape of the defect and the proximity to free margins a modified advancement flap was deemed most appropriate. Using a sterile surgical marker, an appropriate advancement flap was drawn incorporating the defect and placing the expected incisions within the relaxed skin tension lines where possible. The area thus outlined was incised deep to adipose tissue with a #15 scalpel blade. The skin margins were undermined to an appropriate distance in all directions utilizing iris scissors. Following this, the designed flap was advanced and carried over into the primary defect and sutured into place.
Mucosal Advancement Flap Text: Given the location of the defect, shape of the defect and the proximity to free margins a mucosal advancement flap was deemed most appropriate. Incisions were made with a 15 blade scalpel in the appropriate fashion along the cutaneous vermilion border and the mucosal lip. The remaining actinically damaged mucosal tissue was excised.  The mucosal advancement flap was then elevated to the gingival sulcus with care taken to preserve the neurovascular structures and advanced into the primary defect. Care was taken to ensure that precise realignment of the vermilion border was achieved.
Peng Advancement Flap Text: The defect edges were debeveled with a #15 scalpel blade. Given the location of the defect, shape of the defect and the proximity to free margins a Peng advancement flap was deemed most appropriate. Using a sterile surgical marker, an appropriate advancement flap was drawn incorporating the defect and placing the expected incisions within the relaxed skin tension lines where possible. The area thus outlined was incised deep to adipose tissue with a #15 scalpel blade. The skin margins were undermined to an appropriate distance in all directions utilizing iris scissors. Following this, the designed flap was advanced and carried over into the primary defect and sutured into place.
Hatchet Flap Text: The defect edges were debeveled with a #15 scalpel blade. Given the location of the defect, shape of the defect and the proximity to free margins a hatchet flap was deemed most appropriate. Using a sterile surgical marker, an appropriate hatchet flap was drawn incorporating the defect and placing the expected incisions within the relaxed skin tension lines where possible. The area thus outlined was incised deep to adipose tissue with a #15 scalpel blade. The skin margins were undermined to an appropriate distance in all directions utilizing iris scissors. Following this, the designed flap was carried over into the primary defect and sutured into place.
Rotation Flap Text: The defect edges were debeveled with a #15 scalpel blade. Given the location of the defect, shape of the defect and the proximity to free margins a rotation flap was deemed most appropriate. Using a sterile surgical marker, an appropriate rotation flap was drawn incorporating the defect and placing the expected incisions within the relaxed skin tension lines where possible. The area thus outlined was incised deep to adipose tissue with a #15 scalpel blade. The skin margins were undermined to an appropriate distance in all directions utilizing iris scissors. Following this, the designed flap was carried over into the primary defect and sutured into place.
Bilateral Rotation Flap Text: The defect edges were debeveled with a #15 scalpel blade. Given the location of the defect, shape of the defect and the proximity to free margins a bilateral rotation flap was deemed most appropriate. Using a sterile surgical marker, an appropriate rotation flap was drawn incorporating the defect and placing the expected incisions within the relaxed skin tension lines where possible. The area thus outlined was incised deep to adipose tissue with a #15 scalpel blade. The skin margins were undermined to an appropriate distance in all directions utilizing iris scissors. Following this, the designed flap was carried over into the primary defect and sutured into place.
Spiral Flap Text: The defect edges were debeveled with a #15 scalpel blade. Given the location of the defect, shape of the defect and the proximity to free margins a spiral flap was deemed most appropriate. Using a sterile surgical marker, an appropriate rotation flap was drawn incorporating the defect and placing the expected incisions within the relaxed skin tension lines where possible. The area thus outlined was incised deep to adipose tissue with a #15 scalpel blade. The skin margins were undermined to an appropriate distance in all directions utilizing iris scissors. Following this, the designed flap was carried over into the primary defect and sutured into place.
Staged Advancement Flap Text: The defect edges were debeveled with a #15 scalpel blade. Given the location of the defect, shape of the defect and the proximity to free margins a staged advancement flap was deemed most appropriate. Using a sterile surgical marker, an appropriate advancement flap was drawn incorporating the defect and placing the expected incisions within the relaxed skin tension lines where possible. The area thus outlined was incised deep to adipose tissue with a #15 scalpel blade. The skin margins were undermined to an appropriate distance in all directions utilizing iris scissors. Following this, the designed flap was carried over into the primary defect and sutured into place.
Star Wedge Flap Text: The defect edges were debeveled with a #15 scalpel blade. Given the location of the defect, shape of the defect and the proximity to free margins a star wedge flap was deemed most appropriate. Using a sterile surgical marker, an appropriate rotation flap was drawn incorporating the defect and placing the expected incisions within the relaxed skin tension lines where possible. The area thus outlined was incised deep to adipose tissue with a #15 scalpel blade. The skin margins were undermined to an appropriate distance in all directions utilizing iris scissors. Following this, the designed flap was carried over into the primary defect and sutured into place.
Transposition Flap Text: The defect edges were debeveled with a #15 scalpel blade. Given the location of the defect and the proximity to free margins a transposition flap was deemed most appropriate. Using a sterile surgical marker, an appropriate transposition flap was drawn incorporating the defect. The area thus outlined was incised deep to adipose tissue with a #15 scalpel blade. The skin margins were undermined to an appropriate distance in all directions utilizing iris scissors. Following this, the designed flap was carried over into the primary defect and sutured into place.
Muscle Hinge Flap Text: The defect edges were debeveled with a #15 scalpel blade.  Given the size, depth and location of the defect and the proximity to free margins a muscle hinge flap was deemed most appropriate. Using a sterile surgical marker, an appropriate hinge flap was drawn incorporating the defect. The area thus outlined was incised with a #15 scalpel blade. The skin margins were undermined to an appropriate distance in all directions utilizing iris scissors. Following this, the designed flap was carried into the primary defect and sutured into place.
Mustarde Flap Text: The defect edges were debeveled with a #15 scalpel blade.  Given the size, depth and location of the defect and the proximity to free margins a Mustarde flap was deemed most appropriate. Using a sterile surgical marker, an appropriate flap was drawn incorporating the defect. The area thus outlined was incised with a #15 scalpel blade. The skin margins were undermined to an appropriate distance in all directions utilizing iris scissors. Following this, the designed flap was carried into the primary defect and sutured into place.
Nasal Turnover Hinge Flap Text: The defect edges were debeveled with a #15 scalpel blade.  Given the size, depth, location of the defect and the defect being full thickness a nasal turnover hinge flap was deemed most appropriate. Using a sterile surgical marker, an appropriate hinge flap was drawn incorporating the defect. The area thus outlined was incised with a #15 scalpel blade. The flap was designed to recreate the nasal mucosal lining and the alar rim. The skin margins were undermined to an appropriate distance in all directions utilizing iris scissors. Following this, the designed flap was carried over into the primary defect and sutured into place
Nasalis-Muscle-Based Myocutaneous Island Pedicle Flap Text: Using a #15 blade, an incision was made around the donor flap to the level of the nasalis muscle. Wide lateral undermining was then performed in both the subcutaneous plane above the nasalis muscle, and in a submuscular plane just above periosteum. This allowed the formation of a free nasalis muscle axial pedicle (based on the angular artery) which was still attached to the actual cutaneous flap, increasing its mobility and vascular viability. Hemostasis was obtained with pinpoint electrocoagulation. The flap was mobilized into position and the pivotal anchor points positioned and stabilized with buried interrupted sutures. Subcutaneous and dermal tissues were closed in a multilayered fashion with sutures. Tissue redundancies were excised, and the epidermal edges were apposed without significant tension and sutured with sutures.
Orbicularis Oris Muscle Flap Text: The defect edges were debeveled with a #15 scalpel blade.  Given that the defect affected the competency of the oral sphincter an orbicularis oris muscle flap was deemed most appropriate to restore this competency and normal muscle function.  Using a sterile surgical marker, an appropriate flap was drawn incorporating the defect. The area thus outlined was incised with a #15 scalpel blade. Following this, the designed flap was carried over into the primary defect and sutured into place.
Melolabial Transposition Flap Text: The defect edges were debeveled with a #15 scalpel blade. Given the location of the defect and the proximity to free margins a melolabial flap was deemed most appropriate. Using a sterile surgical marker, an appropriate melolabial transposition flap was drawn incorporating the defect. The area thus outlined was incised deep to adipose tissue with a #15 scalpel blade. The skin margins were undermined to an appropriate distance in all directions utilizing iris scissors. Following this, the designed flap was carried over into the primary defect and sutured into place.
Rhombic Flap Text: The defect edges were debeveled with a #15 scalpel blade. Given the location of the defect and the proximity to free margins a rhombic flap was deemed most appropriate. Using a sterile surgical marker, an appropriate rhombic flap was drawn incorporating the defect. The area thus outlined was incised deep to adipose tissue with a #15 scalpel blade. The skin margins were undermined to an appropriate distance in all directions utilizing iris scissors. Following this, the designed flap was carried over into the primary defect and sutured into place.
Rhomboid Transposition Flap Text: The defect edges were debeveled with a #15 scalpel blade. Given the location of the defect and the proximity to free margins a rhomboid transposition flap was deemed most appropriate. Using a sterile surgical marker, an appropriate rhomboid flap was drawn incorporating the defect. The area thus outlined was incised deep to adipose tissue with a #15 scalpel blade. The skin margins were undermined to an appropriate distance in all directions utilizing iris scissors. Following this, the designed flap was carried over into the primary defect and sutured into place.
Bi-Rhombic Flap Text: The defect edges were debeveled with a #15 scalpel blade. Given the location of the defect and the proximity to free margins a bi-rhombic flap was deemed most appropriate. Using a sterile surgical marker, an appropriate rhombic flap was drawn incorporating the defect. The area thus outlined was incised deep to adipose tissue with a #15 scalpel blade. The skin margins were undermined to an appropriate distance in all directions utilizing iris scissors. Following this, the designed flap was carried over into the primary defect and sutured into place.
Helical Rim Advancement Flap Text: The defect edges were debeveled with a #15 blade scalpel.  Given the location of the defect and the proximity to free margins (helical rim) a double helical rim advancement flap was deemed most appropriate. Using a sterile surgical marker, the appropriate advancement flaps were drawn incorporating the defect and placing the expected incisions between the helical rim and antihelix where possible.  The area thus outlined was incised through and through with a #15 scalpel blade.  With a skin hook and iris scissors, the flaps were gently and sharply undermined and freed up. Folllowing this, the designed flaps were carried over into the primary defect and sutured into place.
Bilateral Helical Rim Advancement Flap Text: The defect edges were debeveled with a #15 blade scalpel.  Given the location of the defect and the proximity to free margins (helical rim) a bilateral helical rim advancement flap was deemed most appropriate. Using a sterile surgical marker, the appropriate advancement flaps were drawn incorporating the defect and placing the expected incisions between the helical rim and antihelix where possible.  The area thus outlined was incised through and through with a #15 scalpel blade.  With a skin hook and iris scissors, the flaps were gently and sharply undermined and freed up. Following this, the designed flaps were placed into the primary defect and sutured into place.
Ear Star Wedge Flap Text: The defect edges were debeveled with a #15 blade scalpel.  Given the location of the defect and the proximity to free margins (helical rim) an ear star wedge flap was deemed most appropriate. Using a sterile surgical marker, the appropriate flap was drawn incorporating the defect and placing the expected incisions between the helical rim and antihelix where possible.  The area thus outlined was incised through and through with a #15 scalpel blade. Following this, the designed flap was carried over into the primary defect and sutured into place.
Banner Transposition Flap Text: The defect edges were debeveled with a #15 scalpel blade. Given the location of the defect and the proximity to free margins a Banner transposition flap was deemed most appropriate. Using a sterile surgical marker, an appropriate flap was drawn around the defect. The area thus outlined was incised deep to adipose tissue with a #15 scalpel blade. The skin margins were undermined to an appropriate distance in all directions utilizing iris scissors. Following this, the designed flap was carried into the primary defect and sutured into place.
Bilobed Flap Text: The defect edges were debeveled with a #15 scalpel blade. Given the location of the defect and the proximity to free margins a bilobe flap was deemed most appropriate. Using a sterile surgical marker, an appropriate bilobe flap drawn around the defect. The area thus outlined was incised deep to adipose tissue with a #15 scalpel blade. The skin margins were undermined to an appropriate distance in all directions utilizing iris scissors. Following this, the designed flap was carried over into the primary defect and sutured into place.
Bilobed Transposition Flap Text: The defect edges were debeveled with a #15 scalpel blade. Given the location of the defect and the proximity to free margins a bilobed transposition flap was deemed most appropriate. Using a sterile surgical marker, an appropriate bilobe flap drawn around the defect. The area thus outlined was incised deep to adipose tissue with a #15 scalpel blade. The skin margins were undermined to an appropriate distance in all directions utilizing iris scissors. Following this, the designed flap was carried over into the primary defect and sutured into place.
Trilobed Flap Text: The defect edges were debeveled with a #15 scalpel blade. Given the location of the defect and the proximity to free margins a trilobed flap was deemed most appropriate. Using a sterile surgical marker, an appropriate trilobed flap was drawn around the defect. The area thus outlined was incised deep to adipose tissue with a #15 scalpel blade. The skin margins were undermined to an appropriate distance in all directions utilizing iris scissors. Following this, the designed flap was carried into the primary defect and sutured into place.
Dorsal Nasal Flap Text: The defect edges were debeveled with a #15 scalpel blade. Given the location of the defect and the proximity to free margins a dorsal nasal flap was deemed most appropriate. Using a sterile surgical marker, an appropriate dorsal nasal flap was drawn around the defect. The area thus outlined was incised deep to adipose tissue with a #15 scalpel blade. The skin margins were undermined to an appropriate distance in all directions utilizing iris scissors. Following this, the designed flap was carried into the primary defect and sutured into place.
Island Pedicle Flap Text: The defect edges were debeveled with a #15 scalpel blade. Given the location of the defect, shape of the defect and the proximity to free margins an island pedicle advancement flap was deemed most appropriate. Using a sterile surgical marker, an appropriate advancement flap was drawn incorporating the defect, outlining the appropriate donor tissue and placing the expected incisions within the relaxed skin tension lines where possible. The area thus outlined was incised deep to adipose tissue with a #15 scalpel blade. The skin margins were undermined to an appropriate distance in all directions around the primary defect and laterally outward around the island pedicle utilizing iris scissors.  There was minimal undermining beneath the pedicle flap. Following this, the flap was carried over into the primary defect and sutured into place.
Island Pedicle Flap With Canthal Suspension Text: The defect edges were debeveled with a #15 scalpel blade. Given the location of the defect, shape of the defect and the proximity to free margins an island pedicle advancement flap was deemed most appropriate. Using a sterile surgical marker, an appropriate advancement flap was drawn incorporating the defect, outlining the appropriate donor tissue and placing the expected incisions within the relaxed skin tension lines where possible. The area thus outlined was incised deep to adipose tissue with a #15 scalpel blade. The skin margins were undermined to an appropriate distance in all directions around the primary defect and laterally outward around the island pedicle utilizing iris scissors.  There was minimal undermining beneath the pedicle flap. A suspension suture was placed in the canthal tendon to prevent tension and prevent ectropion. Following this, the designed flap was placed into the primary defect and sutured into place.
Alar Island Pedicle Flap Text: The defect edges were debeveled with a #15 scalpel blade. Given the location of the defect, shape of the defect and the proximity to the alar rim an island pedicle advancement flap was deemed most appropriate. Using a sterile surgical marker, an appropriate advancement flap was drawn incorporating the defect, outlining the appropriate donor tissue and placing the expected incisions within the nasal ala running parallel to the alar rim. The area thus outlined was incised with a #15 scalpel blade. The skin margins were undermined minimally to an appropriate distance in all directions around the primary defect and laterally outward around the island pedicle utilizing iris scissors.  There was minimal undermining beneath the pedicle flap. Following this, the designed flap was carried over into the primary defect and sutured into place.
Double Island Pedicle Flap Text: The defect edges were debeveled with a #15 scalpel blade. Given the location of the defect, shape of the defect and the proximity to free margins a double island pedicle advancement flap was deemed most appropriate. Using a sterile surgical marker, an appropriate advancement flap was drawn incorporating the defect, outlining the appropriate donor tissue and placing the expected incisions within the relaxed skin tension lines where possible. The area thus outlined was incised deep to adipose tissue with a #15 scalpel blade. The skin margins were undermined to an appropriate distance in all directions around the primary defect and laterally outward around the island pedicle utilizing iris scissors.  There was minimal undermining beneath the pedicle flap. Following this, the flap was carried over into the primary defect and sutured into place.
Island Pedicle Flap-Requiring Vessel Identification Text: The defect edges were debeveled with a #15 scalpel blade. Given the location of the defect, shape of the defect and the proximity to free margins an island pedicle advancement flap was deemed most appropriate. Using a sterile surgical marker, an appropriate advancement flap was drawn, based on the axial vessel mentioned above, incorporating the defect, outlining the appropriate donor tissue and placing the expected incisions within the relaxed skin tension lines where possible. The area thus outlined was incised deep to adipose tissue with a #15 scalpel blade. The skin margins were undermined to an appropriate distance in all directions around the primary defect and laterally outward around the island pedicle utilizing iris scissors.  There was minimal undermining beneath the pedicle flap. Following this, the designed flap was carried over into the primary defect and sutured into place.
Keystone Flap Text: The defect edges were debeveled with a #15 scalpel blade. Given the location of the defect, shape of the defect a keystone flap was deemed most appropriate. Using a sterile surgical marker, an appropriate keystone flap was drawn incorporating the defect, outlining the appropriate donor tissue and placing the expected incisions within the relaxed skin tension lines where possible. The area thus outlined was incised deep to adipose tissue with a #15 scalpel blade. The skin margins were undermined to an appropriate distance in all directions around the primary defect and laterally outward around the flap utilizing iris scissors. Following this, the designed flap was carried into the primary defect and sutured into place.
O-T Plasty Text: The defect edges were debeveled with a #15 scalpel blade. Given the location of the defect, shape of the defect and the proximity to free margins an O-T plasty was deemed most appropriate. Using a sterile surgical marker, an appropriate O-T plasty was drawn incorporating the defect and placing the expected incisions within the relaxed skin tension lines where possible. The area thus outlined was incised deep to adipose tissue with a #15 scalpel blade. The skin margins were undermined to an appropriate distance in all directions utilizing iris scissors. Following this, the designed flap was carried over into the primary defect and sutured into place.
O-Z Plasty Text: The defect edges were debeveled with a #15 scalpel blade. Given the location of the defect, shape of the defect and the proximity to free margins an O-Z plasty (double transposition flap) was deemed most appropriate. Using a sterile surgical marker, the appropriate transposition flaps were drawn incorporating the defect and placing the expected incisions within the relaxed skin tension lines where possible. The area thus outlined was incised deep to adipose tissue with a #15 scalpel blade. The skin margins were undermined to an appropriate distance in all directions utilizing iris scissors. Hemostasis was achieved with electrocautery. The flaps were then transposed and carried over into place, one clockwise and the other counterclockwise, and anchored with interrupted buried subcutaneous sutures.
Double O-Z Plasty Text: The defect edges were debeveled with a #15 scalpel blade. Given the location of the defect, shape of the defect and the proximity to free margins a Double O-Z plasty (double transposition flap) was deemed most appropriate. Using a sterile surgical marker, the appropriate transposition flaps were drawn incorporating the defect and placing the expected incisions within the relaxed skin tension lines where possible. The area thus outlined was incised deep to adipose tissue with a #15 scalpel blade. The skin margins were undermined to an appropriate distance in all directions utilizing iris scissors. Hemostasis was achieved with electrocautery. The flaps were then transposed and carried over into place, one clockwise and the other counterclockwise, and anchored with interrupted buried subcutaneous sutures.
V-Y Plasty Text: The defect edges were debeveled with a #15 scalpel blade. Given the location of the defect, shape of the defect and the proximity to free margins an V-Y advancement flap was deemed most appropriate. Using a sterile surgical marker, an appropriate advancement flap was drawn incorporating the defect and placing the expected incisions within the relaxed skin tension lines where possible. The area thus outlined was incised deep to adipose tissue with a #15 scalpel blade. The skin margins were undermined to an appropriate distance in all directions utilizing iris scissors. Following this, the designed flap was advanced and carried over into the primary defect and sutured into place.
H Plasty Text: Given the location of the defect, shape of the defect and the proximity to free margins a H-plasty was deemed most appropriate for repair. Using a sterile surgical marker, the appropriate advancement arms of the H-plasty were drawn incorporating the defect and placing the expected incisions within the relaxed skin tension lines where possible. The area thus outlined was incised deep to adipose tissue with a #15 scalpel blade. The skin margins were undermined to an appropriate distance in all directions utilizing iris scissors.  The opposing advancement arms were then advanced and carried over into place in opposite direction and anchored with interrupted buried subcutaneous sutures.
W Plasty Text: The lesion was extirpated to the level of the fat with a #15 scalpel blade. Given the location of the defect, shape of the defect and the proximity to free margins a W-plasty was deemed most appropriate for repair. Using a sterile surgical marker, the appropriate transposition arms of the W-plasty were drawn incorporating the defect and placing the expected incisions within the relaxed skin tension lines where possible. The area thus outlined was incised deep to adipose tissue with a #15 scalpel blade. The skin margins were undermined to an appropriate distance in all directions utilizing iris scissors. The opposing transposition arms were then transposed and carried over into place in opposite direction and anchored with interrupted buried subcutaneous sutures.
Z Plasty Text: The lesion was extirpated to the level of the fat with a #15 scalpel blade. Given the location of the defect, shape of the defect and the proximity to free margins a Z-plasty was deemed most appropriate for repair. Using a sterile surgical marker, the appropriate transposition arms of the Z-plasty were drawn incorporating the defect and placing the expected incisions within the relaxed skin tension lines where possible. The area thus outlined was incised deep to adipose tissue with a #15 scalpel blade. The skin margins were undermined to an appropriate distance in all directions utilizing iris scissors. The opposing transposition arms were then transposed and carried over into place in opposite direction and anchored with interrupted buried subcutaneous sutures.
Double Z Plasty Text: The lesion was extirpated to the level of the fat with a #15 scalpel blade. Given the location of the defect, shape of the defect and the proximity to free margins a double Z-plasty was deemed most appropriate for repair. Using a sterile surgical marker, the appropriate transposition arms of the double Z-plasty were drawn incorporating the defect and placing the expected incisions within the relaxed skin tension lines where possible. The area thus outlined was incised deep to adipose tissue with a #15 scalpel blade. The skin margins were undermined to an appropriate distance in all directions utilizing iris scissors. The opposing transposition arms were then transposed and carried over into place in opposite direction and anchored with interrupted buried subcutaneous sutures.
Zygomaticofacial Flap Text: Given the location of the defect, shape of the defect and the proximity to free margins a zygomaticofacial flap was deemed most appropriate for repair. Using a sterile surgical marker, the appropriate flap was drawn incorporating the defect and placing the expected incisions within the relaxed skin tension lines where possible. The area thus outlined was incised deep to adipose tissue with a #15 scalpel blade with preservation of a vascular pedicle.  The skin margins were undermined to an appropriate distance in all directions utilizing iris scissors. The flap was then carried over into the defect and anchored with interrupted buried subcutaneous sutures.
Cheek Interpolation Flap Text: A decision was made to reconstruct the defect utilizing an interpolation axial flap and a staged reconstruction.  A telfa template was made of the defect.  This telfa template was then used to outline the Cheek Interpolation flap.  The donor area for the pedicle flap was then injected with anesthesia.  The flap was excised through the skin and subcutaneous tissue down to the layer of the underlying musculature.  The interpolation flap was carefully excised within this deep plane to maintain its blood supply.  The edges of the donor site were undermined.   The donor site was closed in a primary fashion.  The pedicle was then rotated into position and sutured.  Once the tube was sutured into place, adequate blood supply was confirmed with blanching and refill.  The pedicle was then wrapped with xeroform gauze and dressed appropriately with a telfa and gauze bandage to ensure continued blood supply and protect the attached pedicle.
Cheek-To-Nose Interpolation Flap Text: A decision was made to reconstruct the defect utilizing an interpolation axial flap and a staged reconstruction.  A telfa template was made of the defect.  This telfa template was then used to outline the Cheek-To-Nose Interpolation flap.  The donor area for the pedicle flap was then injected with anesthesia.  The flap was excised through the skin and subcutaneous tissue down to the layer of the underlying musculature.  The interpolation flap was carefully excised within this deep plane to maintain its blood supply.  The edges of the donor site were undermined.   The donor site was closed in a primary fashion.  The pedicle was then rotated into position and sutured.  Once the tube was sutured into place, adequate blood supply was confirmed with blanching and refill.  The pedicle was then wrapped with xeroform gauze and dressed appropriately with a telfa and gauze bandage to ensure continued blood supply and protect the attached pedicle.
Interpolation Flap Text: A decision was made to reconstruct the defect utilizing an interpolation axial flap and a staged reconstruction.  A telfa template was made of the defect.  This telfa template was then used to outline the interpolation flap.  The donor area for the pedicle flap was then injected with anesthesia.  The flap was excised through the skin and subcutaneous tissue down to the layer of the underlying musculature.  The interpolation flap was carefully excised within this deep plane to maintain its blood supply.  The edges of the donor site were undermined.   The donor site was closed in a primary fashion.  The pedicle was then rotated into position and sutured.  Once the tube was sutured into place, adequate blood supply was confirmed with blanching and refill.  The pedicle was then wrapped with xeroform gauze and dressed appropriately with a telfa and gauze bandage to ensure continued blood supply and protect the attached pedicle.
Melolabial Interpolation Flap Text: A decision was made to reconstruct the defect utilizing an interpolation axial flap and a staged reconstruction.  A telfa template was made of the defect.  This telfa template was then used to outline the melolabial interpolation flap.  The donor area for the pedicle flap was then injected with anesthesia.  The flap was excised through the skin and subcutaneous tissue down to the layer of the underlying musculature.  The pedicle flap was carefully excised within this deep plane to maintain its blood supply.  The edges of the donor site were undermined.   The donor site was closed in a primary fashion.  The pedicle was then rotated into position and sutured.  Once the tube was sutured into place, adequate blood supply was confirmed with blanching and refill.  The pedicle was then wrapped with xeroform gauze and dressed appropriately with a telfa and gauze bandage to ensure continued blood supply and protect the attached pedicle.
Mastoid Interpolation Flap Text: A decision was made to reconstruct the defect utilizing an interpolation axial flap and a staged reconstruction.  A telfa template was made of the defect.  This telfa template was then used to outline the mastoid interpolation flap.  The donor area for the pedicle flap was then injected with anesthesia.  The flap was excised through the skin and subcutaneous tissue down to the layer of the underlying musculature.  The pedicle flap was carefully excised within this deep plane to maintain its blood supply.  The edges of the donor site were undermined.   The donor site was closed in a primary fashion.  The pedicle was then rotated into position and sutured.  Once the tube was sutured into place, adequate blood supply was confirmed with blanching and refill.  The pedicle was then wrapped with xeroform gauze and dressed appropriately with a telfa and gauze bandage to ensure continued blood supply and protect the attached pedicle.
Posterior Auricular Interpolation Flap Text: A decision was made to reconstruct the defect utilizing an interpolation axial flap and a staged reconstruction.  A telfa template was made of the defect.  This telfa template was then used to outline the posterior auricular interpolation flap.  The donor area for the pedicle flap was then injected with anesthesia.  The flap was excised through the skin and subcutaneous tissue down to the layer of the underlying musculature.  The pedicle flap was carefully excised within this deep plane to maintain its blood supply.  The edges of the donor site were undermined.   The donor site was closed in a primary fashion.  The pedicle was then rotated into position and sutured.  Once the tube was sutured into place, adequate blood supply was confirmed with blanching and refill.  The pedicle was then wrapped with xeroform gauze and dressed appropriately with a telfa and gauze bandage to ensure continued blood supply and protect the attached pedicle.
Paramedian Forehead Flap Text: A decision was made to reconstruct the defect utilizing an interpolation axial flap and a staged reconstruction.  A telfa template was made of the defect.  This telfa template was then used to outline the paramedian forehead pedicle flap.  The donor area for the pedicle flap was then injected with anesthesia.  The flap was excised through the skin and subcutaneous tissue down to the layer of the underlying musculature.  The pedicle flap was carefully excised within this deep plane to maintain its blood supply.  The edges of the donor site were undermined.   The donor site was closed in a primary fashion.  The pedicle was then rotated into position and sutured.  Once the tube was sutured into place, adequate blood supply was confirmed with blanching and refill.  The pedicle was then wrapped with xeroform gauze and dressed appropriately with a telfa and gauze bandage to ensure continued blood supply and protect the attached pedicle.
Abbe Flap (Upper To Lower Lip) Text: The defect of the lower lip was assessed and measured.  Given the location and size of the defect, an Abbe flap was deemed most appropriate. Using a sterile surgical marker, an appropriate Abbe flap was measured and drawn on the upper lip. Local anesthesia was then infiltrated.  A scalpel was then used to incise the upper lip through and through the skin, vermilion, muscle and mucosa, leaving the flap pedicled on the opposite side.  The flap was then rotated and transferred to the lower lip defect.  The flap was then sutured into place with a three layer technique, closing the orbicularis oris muscle layer with subcutaneous buried sutures, followed by a mucosal layer and an epidermal layer.
Abbe Flap (Lower To Upper Lip) Text: The defect of the upper lip was assessed and measured.  Given the location and size of the defect, an Abbe flap was deemed most appropriate. Using a sterile surgical marker, an appropriate Abbe flap was measured and drawn on the lower lip. Local anesthesia was then infiltrated. A scalpel was then used to incise the upper lip through and through the skin, vermilion, muscle and mucosa, leaving the flap pedicled on the opposite side.  The flap was then rotated and transferred to the lower lip defect.  The flap was then sutured into place with a three layer technique, closing the orbicularis oris muscle layer with subcutaneous buried sutures, followed by a mucosal layer and an epidermal layer.
Estlander Flap (Upper To Lower Lip) Text: The defect of the lower lip was assessed and measured.  Given the location and size of the defect, an Estlander flap was deemed most appropriate. Using a sterile surgical marker, an appropriate Estlander flap was measured and drawn on the upper lip. Local anesthesia was then infiltrated. A scalpel was then used to incise the lateral aspect of the flap, through skin, muscle and mucosa, leaving the flap pedicled medially.  The flap was then rotated and positioned to fill the lower lip defect.  The flap was then sutured into place with a three layer technique, closing the orbicularis oris muscle layer with subcutaneous buried sutures, followed by a mucosal layer and an epidermal layer.
Lip Wedge Excision Repair Text: Given the location of the defect and the proximity to free margins a full thickness wedge repair was deemed most appropriate. Using a sterile surgical marker, the appropriate repair was drawn incorporating the defect and placing the expected incisions perpendicular to the vermilion border.  The vermilion border was also meticulously outlined to ensure appropriate reapproximation during the repair.  The area thus outlined was incised through and through with a #15 scalpel blade.  The muscularis and dermis were reaproximated with deep sutures following hemostasis. Care was taken to realign the vermilion border before proceeding with the superficial closure.  Once the vermilion was realigned the superfical and mucosal closure was finished.
Ftsg Text: The defect edges were debeveled with a #15 scalpel blade. Given the location of the defect, shape of the defect and the proximity to free margins a full thickness skin graft was deemed most appropriate. Using a sterile surgical marker, the primary defect shape was transferred to the donor site. The area thus outlined was incised deep to adipose tissue with a #15 scalpel blade.  The harvested graft was then trimmed of adipose tissue until only dermis and epidermis was left.  The skin margins of the secondary defect were undermined to an appropriate distance in all directions utilizing iris scissors.  The secondary defect was closed with interrupted buried subcutaneous sutures.  The skin edges were then re-apposed with running  sutures.  The skin graft was then placed in the primary defect and oriented appropriately.
Split-Thickness Skin Graft Text: The defect edges were debeveled with a #15 scalpel blade. Given the location of the defect, shape of the defect and the proximity to free margins a split thickness skin graft was deemed most appropriate. Using a sterile surgical marker, the primary defect shape was transferred to the donor site. The split thickness graft was then harvested.  The skin graft was then placed in the primary defect and oriented appropriately.
Burow's Graft Text: The defect edges were debeveled with a #15 scalpel blade. Given the location of the defect, shape of the defect, the proximity to free margins and the presence of a standing cone deformity a Burow's skin graft was deemed most appropriate. The standing cone was removed and this tissue was then trimmed to the shape of the primary defect. The adipose tissue was also removed until only dermis and epidermis were left.  The skin margins of the secondary defect were undermined to an appropriate distance in all directions utilizing iris scissors.  The secondary defect was closed with interrupted buried subcutaneous sutures.  The skin edges were then re-apposed with running  sutures.  The skin graft was then placed in the primary defect and oriented appropriately.
Cartilage Graft Text: The defect edges were debeveled with a #15 scalpel blade. Given the location of the defect, shape of the defect, the fact the defect involved a full thickness cartilage defect a cartilage graft was deemed most appropriate.  An appropriate donor site was identified, cleansed, and anesthetized. The cartilage graft was then harvested and transferred to the recipient site, oriented appropriately and then sutured into place.  The secondary defect was then repaired using a primary closure.
Composite Graft Text: The defect edges were debeveled with a #15 scalpel blade. Given the location of the defect, shape of the defect, the proximity to free margins and the fact the defect was full thickness a composite graft was deemed most appropriate.  The defect was outline and then transferred to the donor site.  A full thickness graft was then excised from the donor site. The graft was then placed in the primary defect, oriented appropriately and then sutured into place.  The secondary defect was then repaired using a primary closure.
Epidermal Autograft Text: The defect edges were debeveled with a #15 scalpel blade. Given the location of the defect, shape of the defect and the proximity to free margins an epidermal autograft was deemed most appropriate. Using a sterile surgical marker, the primary defect shape was transferred to the donor site. The epidermal graft was then harvested.  The skin graft was then placed in the primary defect and oriented appropriately.
Dermal Autograft Text: The defect edges were debeveled with a #15 scalpel blade. Given the location of the defect, shape of the defect and the proximity to free margins a dermal autograft was deemed most appropriate. Using a sterile surgical marker, the primary defect shape was transferred to the donor site. The area thus outlined was incised deep to adipose tissue with a #15 scalpel blade.  The harvested graft was then trimmed of adipose and epidermal tissue until only dermis was left.  The skin graft was then placed in the primary defect and oriented appropriately.
Skin Substitute Text: The defect edges were debeveled with a #15 scalpel blade. Given the location of the defect, shape of the defect and the proximity to free margins a skin substitute graft was deemed most appropriate.  The graft material was trimmed to fit the size of the defect. The graft was then placed in the primary defect and oriented appropriately.
Tissue Cultured Epidermal Autograft Text: The defect edges were debeveled with a #15 scalpel blade. Given the location of the defect, shape of the defect and the proximity to free margins a tissue cultured epidermal autograft was deemed most appropriate.  The graft was then trimmed to fit the size of the defect.  The graft was then placed in the primary defect and oriented appropriately.
Xenograft Text: The defect edges were debeveled with a #15 scalpel blade. Given the location of the defect, shape of the defect and the proximity to free margins a xenograft was deemed most appropriate.  The graft was then trimmed to fit the size of the defect.  The graft was then placed in the primary defect and oriented appropriately.
Purse String (Intermediate) Text: Given the location of the defect and the characteristics of the surrounding skin a purse string intermediate closure was deemed most appropriate.  Undermining was performed circumferentially around the surgical defect.  A purse string suture was then placed and tightened.
Purse String (Simple) Text: Given the location of the defect and the characteristics of the surrounding skin a purse string simple closure was deemed most appropriate.  Undermining was performed circumferentially around the surgical defect.  A purse string suture was then placed and tightened.
Partial Purse String (Intermediate) Text: Given the location of the defect and the characteristics of the surrounding skin an intermediate purse string closure was deemed most appropriate.  Undermining was performed circumferentially around the surgical defect.  A purse string suture was then placed and tightened. Wound tension of the circular defect prevented complete closure of the wound.
Partial Purse String (Simple) Text: Given the location of the defect and the characteristics of the surrounding skin a simple purse string closure was deemed most appropriate.  Undermining was performed circumferentially around the surgical defect.  A purse string suture was then placed and tightened. Wound tension of the circular defect prevented complete closure of the wound.
Complex Repair And Single Advancement Flap Text: The defect edges were debeveled with a #15 scalpel blade.  The primary defect was closed partially with a complex linear closure.  Given the location of the remaining defect, shape of the defect and the proximity to free margins a single advancement flap was deemed most appropriate for complete closure of the defect.  Using a sterile surgical marker, an appropriate advancement flap was drawn incorporating the defect and placing the expected incisions within the relaxed skin tension lines where possible. The area thus outlined was incised deep to adipose tissue with a #15 scalpel blade. The skin margins were undermined to an appropriate distance in all directions utilizing iris scissors and carried over to close the primary defect.
Complex Repair And Double Advancement Flap Text: The defect edges were debeveled with a #15 scalpel blade.  The primary defect was closed partially with a complex linear closure.  Given the location of the remaining defect, shape of the defect and the proximity to free margins a double advancement flap was deemed most appropriate for complete closure of the defect.  Using a sterile surgical marker, an appropriate advancement flap was drawn incorporating the defect and placing the expected incisions within the relaxed skin tension lines where possible. The area thus outlined was incised deep to adipose tissue with a #15 scalpel blade. The skin margins were undermined to an appropriate distance in all directions utilizing iris scissors and carried over to close the primary defect.
Complex Repair And Modified Advancement Flap Text: The defect edges were debeveled with a #15 scalpel blade.  The primary defect was closed partially with a complex linear closure.  Given the location of the remaining defect, shape of the defect and the proximity to free margins a modified advancement flap was deemed most appropriate for complete closure of the defect.  Using a sterile surgical marker, an appropriate advancement flap was drawn incorporating the defect and placing the expected incisions within the relaxed skin tension lines where possible. The area thus outlined was incised deep to adipose tissue with a #15 scalpel blade. The skin margins were undermined to an appropriate distance in all directions utilizing iris scissors and carried over to close the primary defect.
Complex Repair And A-T Advancement Flap Text: The defect edges were debeveled with a #15 scalpel blade.  The primary defect was closed partially with a complex linear closure.  Given the location of the remaining defect, shape of the defect and the proximity to free margins an A-T advancement flap was deemed most appropriate for complete closure of the defect.  Using a sterile surgical marker, an appropriate advancement flap was drawn incorporating the defect and placing the expected incisions within the relaxed skin tension lines where possible. The area thus outlined was incised deep to adipose tissue with a #15 scalpel blade. The skin margins were undermined to an appropriate distance in all directions utilizing iris scissors and carried over to close the primary defect.
Complex Repair And O-T Advancement Flap Text: The defect edges were debeveled with a #15 scalpel blade.  The primary defect was closed partially with a complex linear closure.  Given the location of the remaining defect, shape of the defect and the proximity to free margins an O-T advancement flap was deemed most appropriate for complete closure of the defect.  Using a sterile surgical marker, an appropriate advancement flap was drawn incorporating the defect and placing the expected incisions within the relaxed skin tension lines where possible. The area thus outlined was incised deep to adipose tissue with a #15 scalpel blade. The skin margins were undermined to an appropriate distance in all directions utilizing iris scissors and carried over to close the primary defect.
Complex Repair And O-L Flap Text: The defect edges were debeveled with a #15 scalpel blade.  The primary defect was closed partially with a complex linear closure.  Given the location of the remaining defect, shape of the defect and the proximity to free margins an O-L flap was deemed most appropriate for complete closure of the defect.  Using a sterile surgical marker, an appropriate flap was drawn incorporating the defect and placing the expected incisions within the relaxed skin tension lines where possible. The area thus outlined was incised deep to adipose tissue with a #15 scalpel blade. The skin margins were undermined to an appropriate distance in all directions utilizing iris scissors and carried over to close the primary defect.
Complex Repair And Bilobe Flap Text: The defect edges were debeveled with a #15 scalpel blade.  The primary defect was closed partially with a complex linear closure.  Given the location of the remaining defect, shape of the defect and the proximity to free margins a bilobe flap was deemed most appropriate for complete closure of the defect.  Using a sterile surgical marker, an appropriate advancement flap was drawn incorporating the defect and placing the expected incisions within the relaxed skin tension lines where possible. The area thus outlined was incised deep to adipose tissue with a #15 scalpel blade. The skin margins were undermined to an appropriate distance in all directions utilizing iris scissors and carried over to close the primary defect.
Complex Repair And Melolabial Flap Text: The defect edges were debeveled with a #15 scalpel blade.  The primary defect was closed partially with a complex linear closure.  Given the location of the remaining defect, shape of the defect and the proximity to free margins a melolabial flap was deemed most appropriate for complete closure of the defect.  Using a sterile surgical marker, an appropriate advancement flap was drawn incorporating the defect and placing the expected incisions within the relaxed skin tension lines where possible. The area thus outlined was incised deep to adipose tissue with a #15 scalpel blade. The skin margins were undermined to an appropriate distance in all directions utilizing iris scissors and carried over to close the primary defect.
Complex Repair And Rotation Flap Text: The defect edges were debeveled with a #15 scalpel blade.  The primary defect was closed partially with a complex linear closure.  Given the location of the remaining defect, shape of the defect and the proximity to free margins a rotation flap was deemed most appropriate for complete closure of the defect.  Using a sterile surgical marker, an appropriate advancement flap was drawn incorporating the defect and placing the expected incisions within the relaxed skin tension lines where possible. The area thus outlined was incised deep to adipose tissue with a #15 scalpel blade. The skin margins were undermined to an appropriate distance in all directions utilizing iris scissors and carried over to close the primary defect.
Complex Repair And Rhombic Flap Text: The defect edges were debeveled with a #15 scalpel blade.  The primary defect was closed partially with a complex linear closure.  Given the location of the remaining defect, shape of the defect and the proximity to free margins a rhombic flap was deemed most appropriate for complete closure of the defect.  Using a sterile surgical marker, an appropriate advancement flap was drawn incorporating the defect and placing the expected incisions within the relaxed skin tension lines where possible. The area thus outlined was incised deep to adipose tissue with a #15 scalpel blade. The skin margins were undermined to an appropriate distance in all directions utilizing iris scissors and carried over to close the primary defect.
Complex Repair And Transposition Flap Text: The defect edges were debeveled with a #15 scalpel blade.  The primary defect was closed partially with a complex linear closure.  Given the location of the remaining defect, shape of the defect and the proximity to free margins a transposition flap was deemed most appropriate for complete closure of the defect.  Using a sterile surgical marker, an appropriate advancement flap was drawn incorporating the defect and placing the expected incisions within the relaxed skin tension lines where possible. The area thus outlined was incised deep to adipose tissue with a #15 scalpel blade. The skin margins were undermined to an appropriate distance in all directions utilizing iris scissors and carried over to close the primary defect.
Complex Repair And V-Y Plasty Text: The defect edges were debeveled with a #15 scalpel blade.  The primary defect was closed partially with a complex linear closure.  Given the location of the remaining defect, shape of the defect and the proximity to free margins a V-Y plasty was deemed most appropriate for complete closure of the defect.  Using a sterile surgical marker, an appropriate advancement flap was drawn incorporating the defect and placing the expected incisions within the relaxed skin tension lines where possible. The area thus outlined was incised deep to adipose tissue with a #15 scalpel blade. The skin margins were undermined to an appropriate distance in all directions utilizing iris scissors and carried over to close the primary defect.
Complex Repair And M Plasty Text: The defect edges were debeveled with a #15 scalpel blade.  The primary defect was closed partially with a complex linear closure.  Given the location of the remaining defect, shape of the defect and the proximity to free margins an M plasty was deemed most appropriate for complete closure of the defect.  Using a sterile surgical marker, an appropriate advancement flap was drawn incorporating the defect and placing the expected incisions within the relaxed skin tension lines where possible. The area thus outlined was incised deep to adipose tissue with a #15 scalpel blade. The skin margins were undermined to an appropriate distance in all directions utilizing iris scissors and carried over to close the primary defect.
Complex Repair And Double M Plasty Text: The defect edges were debeveled with a #15 scalpel blade.  The primary defect was closed partially with a complex linear closure.  Given the location of the remaining defect, shape of the defect and the proximity to free margins a double M plasty was deemed most appropriate for complete closure of the defect.  Using a sterile surgical marker, an appropriate advancement flap was drawn incorporating the defect and placing the expected incisions within the relaxed skin tension lines where possible. The area thus outlined was incised deep to adipose tissue with a #15 scalpel blade. The skin margins were undermined to an appropriate distance in all directions utilizing iris scissors and carried over to close the primary defect.
Complex Repair And W Plasty Text: The defect edges were debeveled with a #15 scalpel blade.  The primary defect was closed partially with a complex linear closure.  Given the location of the remaining defect, shape of the defect and the proximity to free margins a W plasty was deemed most appropriate for complete closure of the defect.  Using a sterile surgical marker, an appropriate advancement flap was drawn incorporating the defect and placing the expected incisions within the relaxed skin tension lines where possible. The area thus outlined was incised deep to adipose tissue with a #15 scalpel blade. The skin margins were undermined to an appropriate distance in all directions utilizing iris scissors and carried over to close the primary defect.
Complex Repair And Z Plasty Text: The defect edges were debeveled with a #15 scalpel blade.  The primary defect was closed partially with a complex linear closure.  Given the location of the remaining defect, shape of the defect and the proximity to free margins a Z plasty was deemed most appropriate for complete closure of the defect.  Using a sterile surgical marker, an appropriate advancement flap was drawn incorporating the defect and placing the expected incisions within the relaxed skin tension lines where possible. The area thus outlined was incised deep to adipose tissue with a #15 scalpel blade. The skin margins were undermined to an appropriate distance in all directions utilizing iris scissors and carried over to close the primary defect.
Complex Repair And Dorsal Nasal Flap Text: The defect edges were debeveled with a #15 scalpel blade.  The primary defect was closed partially with a complex linear closure.  Given the location of the remaining defect, shape of the defect and the proximity to free margins a dorsal nasal flap was deemed most appropriate for complete closure of the defect.  Using a sterile surgical marker, an appropriate flap was drawn incorporating the defect and placing the expected incisions within the relaxed skin tension lines where possible. The area thus outlined was incised deep to adipose tissue with a #15 scalpel blade. The skin margins were undermined to an appropriate distance in all directions utilizing iris scissors and carried over to close the primary defect.
Complex Repair And Ftsg Text: The defect edges were debeveled with a #15 scalpel blade.  The primary defect was closed partially with a complex linear closure.  Given the location of the defect, shape of the defect and the proximity to free margins a full thickness skin graft was deemed most appropriate to repair the remaining defect.  The graft was trimmed to fit the size of the remaining defect.  The graft was then placed in the primary defect, oriented appropriately, and sutured into place.
Complex Repair And Burow's Graft Text: The defect edges were debeveled with a #15 scalpel blade.  The primary defect was closed partially with a complex linear closure.  Given the location of the defect, shape of the defect, the proximity to free margins and the presence of a standing cone deformity a Burow's graft was deemed most appropriate to repair the remaining defect.  The graft was trimmed to fit the size of the remaining defect.  The graft was then placed in the primary defect, oriented appropriately, and sutured into place.
Complex Repair And Split-Thickness Skin Graft Text: The defect edges were debeveled with a #15 scalpel blade.  The primary defect was closed partially with a complex linear closure.  Given the location of the defect, shape of the defect and the proximity to free margins a split thickness skin graft was deemed most appropriate to repair the remaining defect.  The graft was trimmed to fit the size of the remaining defect.  The graft was then placed in the primary defect, oriented appropriately, and sutured into place.
Complex Repair And Epidermal Autograft Text: The defect edges were debeveled with a #15 scalpel blade.  The primary defect was closed partially with a complex linear closure.  Given the location of the defect, shape of the defect and the proximity to free margins an epidermal autograft was deemed most appropriate to repair the remaining defect.  The graft was trimmed to fit the size of the remaining defect.  The graft was then placed in the primary defect, oriented appropriately, and sutured into place.
Complex Repair And Dermal Autograft Text: The defect edges were debeveled with a #15 scalpel blade.  The primary defect was closed partially with a complex linear closure.  Given the location of the defect, shape of the defect and the proximity to free margins an dermal autograft was deemed most appropriate to repair the remaining defect.  The graft was trimmed to fit the size of the remaining defect.  The graft was then placed in the primary defect, oriented appropriately, and sutured into place.
Complex Repair And Tissue Cultured Epidermal Autograft Text: The defect edges were debeveled with a #15 scalpel blade.  The primary defect was closed partially with a complex linear closure.  Given the location of the defect, shape of the defect and the proximity to free margins an tissue cultured epidermal autograft was deemed most appropriate to repair the remaining defect.  The graft was trimmed to fit the size of the remaining defect.  The graft was then placed in the primary defect, oriented appropriately, and sutured into place.
Complex Repair And Xenograft Text: The defect edges were debeveled with a #15 scalpel blade.  The primary defect was closed partially with a complex linear closure.  Given the location of the defect, shape of the defect and the proximity to free margins a xenograft was deemed most appropriate to repair the remaining defect.  The graft was trimmed to fit the size of the remaining defect.  The graft was then placed in the primary defect, oriented appropriately, and sutured into place.
Complex Repair And Skin Substitute Graft Text: The defect edges were debeveled with a #15 scalpel blade.  The primary defect was closed partially with a complex linear closure.  Given the location of the remaining defect, shape of the defect and the proximity to free margins a skin substitute graft was deemed most appropriate to repair the remaining defect.  The graft was trimmed to fit the size of the remaining defect.  The graft was then placed in the primary defect, oriented appropriately, and sutured into place.
Path Notes (To The Dermatopathologist): Please check margins. *adjacent pigmented lesion included*
Consent was obtained from the patient. The risks and benefits to therapy were discussed in detail. Specifically, the risks of infection, scarring, bleeding, prolonged wound healing, incomplete removal, allergy to anesthesia, nerve injury and recurrence were addressed. Prior to the procedure, the treatment site was clearly identified and confirmed by the patient. All components of Universal Protocol/PAUSE Rule completed.
Post-Care Instructions: I reviewed with the patient in detail post-care instructions. Patient is not to engage in any heavy lifting, exercise, or swimming for the next 14 days. Should the patient develop any fevers, chills, bleeding, severe pain patient will contact the office immediately.
Home Suture Removal Text: Patient was provided a home suture removal kit and will remove their sutures at home.  If they have any questions or difficulties they will call the office.
Where Do You Want The Question To Include Opioid Counseling Located?: Case Summary Tab
Information: Selecting Yes will display possible errors in your note based on the variables you have selected. This validation is only offered as a suggestion for you. PLEASE NOTE THAT THE VALIDATION TEXT WILL BE REMOVED WHEN YOU FINALIZE YOUR NOTE. IF YOU WANT TO FAX A PRELIMINARY NOTE YOU WILL NEED TO TOGGLE THIS TO 'NO' IF YOU DO NOT WANT IT IN YOUR FAXED NOTE.

## 2023-06-01 ENCOUNTER — APPOINTMENT (RX ONLY)
Dept: URBAN - METROPOLITAN AREA CLINIC 23 | Facility: CLINIC | Age: 68
Setting detail: DERMATOLOGY
End: 2023-06-01

## 2023-06-01 DIAGNOSIS — Z48.01 ENCOUNTER FOR CHANGE OR REMOVAL OF SURGICAL WOUND DRESSING: ICD-10-CM

## 2023-06-01 PROCEDURE — ? SUTURE REMOVAL (GLOBAL PERIOD)

## 2023-06-01 PROCEDURE — 99024 POSTOP FOLLOW-UP VISIT: CPT

## 2023-06-01 ASSESSMENT — LOCATION ZONE DERM: LOCATION ZONE: TRUNK

## 2023-06-01 ASSESSMENT — LOCATION DETAILED DESCRIPTION DERM: LOCATION DETAILED: LEFT CLAVICULAR SKIN

## 2023-06-01 ASSESSMENT — LOCATION SIMPLE DESCRIPTION DERM: LOCATION SIMPLE: LEFT CLAVICULAR SKIN

## 2023-06-01 NOTE — PROCEDURE: SUTURE REMOVAL (GLOBAL PERIOD)
Detail Level: Detailed
Add 98687 Cpt? (Important Note: In 2017 The Use Of 74305 Is Being Tracked By Cms To Determine Future Global Period Reimbursement For Global Periods): yes

## 2023-08-21 ENCOUNTER — APPOINTMENT (RX ONLY)
Dept: URBAN - METROPOLITAN AREA CLINIC 23 | Facility: CLINIC | Age: 68
Setting detail: DERMATOLOGY
End: 2023-08-21

## 2023-08-21 DIAGNOSIS — D22 MELANOCYTIC NEVI: ICD-10-CM

## 2023-08-21 DIAGNOSIS — L57.0 ACTINIC KERATOSIS: ICD-10-CM | Status: INADEQUATELY CONTROLLED

## 2023-08-21 DIAGNOSIS — Z85.828 PERSONAL HISTORY OF OTHER MALIGNANT NEOPLASM OF SKIN: ICD-10-CM

## 2023-08-21 DIAGNOSIS — L57.8 OTHER SKIN CHANGES DUE TO CHRONIC EXPOSURE TO NONIONIZING RADIATION: ICD-10-CM | Status: INADEQUATELY CONTROLLED

## 2023-08-21 PROBLEM — D22.72 MELANOCYTIC NEVI OF LEFT LOWER LIMB, INCLUDING HIP: Status: ACTIVE | Noted: 2023-08-21

## 2023-08-21 PROCEDURE — ? LIQUID NITROGEN

## 2023-08-21 PROCEDURE — ? COUNSELING

## 2023-08-21 PROCEDURE — 17000 DESTRUCT PREMALG LESION: CPT

## 2023-08-21 PROCEDURE — 17003 DESTRUCT PREMALG LES 2-14: CPT

## 2023-08-21 PROCEDURE — 99213 OFFICE O/P EST LOW 20 MIN: CPT | Mod: 25

## 2023-08-21 ASSESSMENT — LOCATION ZONE DERM
LOCATION ZONE: FEET
LOCATION ZONE: FACE
LOCATION ZONE: ARM
LOCATION ZONE: NOSE
LOCATION ZONE: TRUNK

## 2023-08-21 ASSESSMENT — LOCATION DETAILED DESCRIPTION DERM
LOCATION DETAILED: RIGHT POSTERIOR SHOULDER
LOCATION DETAILED: LEFT DORSAL FOOT
LOCATION DETAILED: LEFT FOREHEAD
LOCATION DETAILED: LEFT POSTERIOR SHOULDER
LOCATION DETAILED: NASAL DORSUM
LOCATION DETAILED: RIGHT ANTERIOR SHOULDER
LOCATION DETAILED: LEFT ANTERIOR SHOULDER
LOCATION DETAILED: RIGHT RIB CAGE
LOCATION DETAILED: LEFT CLAVICULAR SKIN

## 2023-08-21 ASSESSMENT — LOCATION SIMPLE DESCRIPTION DERM
LOCATION SIMPLE: LEFT FOOT
LOCATION SIMPLE: ABDOMEN
LOCATION SIMPLE: NOSE
LOCATION SIMPLE: LEFT FOREHEAD
LOCATION SIMPLE: RIGHT SHOULDER
LOCATION SIMPLE: LEFT CLAVICULAR SKIN
LOCATION SIMPLE: LEFT SHOULDER

## 2023-08-21 NOTE — PROCEDURE: LIQUID NITROGEN
Show Aperture Variable?: Yes
Consent: The patient's consent was obtained including but not limited to risks of crusting, scabbing, blistering, scarring, darker or lighter pigmentary change, recurrence, incomplete removal and infection.
Detail Level: Detailed
Number Of Freeze-Thaw Cycles: 2 freeze-thaw cycles
Render Note In Bullet Format When Appropriate: No
Duration Of Freeze Thaw-Cycle (Seconds): 0
Post-Care Instructions: I reviewed with the patient in detail post-care instructions. Patient is to wear sunprotection, and avoid picking at any of the treated lesions. Pt may apply Vaseline to crusted or scabbing areas.
Application Tool (Optional): Liquid Nitrogen Sprayer
Aperture Size (Optional): C

## 2023-09-21 ENCOUNTER — TELEPHONE (OUTPATIENT)
Dept: RADIATION ONCOLOGY | Age: 68
End: 2023-09-21

## 2023-10-02 ENCOUNTER — OFFICE VISIT (OUTPATIENT)
Dept: ONCOLOGY | Age: 68
End: 2023-10-02
Payer: MEDICARE

## 2023-10-02 ENCOUNTER — HOSPITAL ENCOUNTER (OUTPATIENT)
Dept: LAB | Age: 68
Discharge: HOME | End: 2023-10-05
Payer: MEDICARE

## 2023-10-02 VITALS
HEART RATE: 89 BPM | TEMPERATURE: 97.9 F | RESPIRATION RATE: 12 BRPM | WEIGHT: 206 LBS | SYSTOLIC BLOOD PRESSURE: 125 MMHG | BODY MASS INDEX: 30.51 KG/M2 | DIASTOLIC BLOOD PRESSURE: 85 MMHG | HEIGHT: 69 IN | OXYGEN SATURATION: 97 %

## 2023-10-02 DIAGNOSIS — C61 PROSTATE CANCER (HCC): Primary | ICD-10-CM

## 2023-10-02 DIAGNOSIS — C61 PROSTATE CANCER (HCC): ICD-10-CM

## 2023-10-02 PROCEDURE — 1123F ACP DISCUSS/DSCN MKR DOCD: CPT | Performed by: UROLOGY

## 2023-10-02 PROCEDURE — G8484 FLU IMMUNIZE NO ADMIN: HCPCS | Performed by: UROLOGY

## 2023-10-02 PROCEDURE — G8427 DOCREV CUR MEDS BY ELIG CLIN: HCPCS | Performed by: UROLOGY

## 2023-10-02 PROCEDURE — 84153 ASSAY OF PSA TOTAL: CPT

## 2023-10-02 PROCEDURE — G8417 CALC BMI ABV UP PARAM F/U: HCPCS | Performed by: UROLOGY

## 2023-10-02 PROCEDURE — 99213 OFFICE O/P EST LOW 20 MIN: CPT | Performed by: UROLOGY

## 2023-10-02 PROCEDURE — 3017F COLORECTAL CA SCREEN DOC REV: CPT | Performed by: UROLOGY

## 2023-10-02 PROCEDURE — 36415 COLL VENOUS BLD VENIPUNCTURE: CPT

## 2023-10-02 PROCEDURE — 1036F TOBACCO NON-USER: CPT | Performed by: UROLOGY

## 2023-10-02 RX ORDER — OXYBUTYNIN CHLORIDE 5 MG/1
5 TABLET ORAL
Qty: 90 TABLET | Refills: 3 | Status: SHIPPED | OUTPATIENT
Start: 2023-10-02

## 2023-10-02 ASSESSMENT — PATIENT HEALTH QUESTIONNAIRE - PHQ9
SUM OF ALL RESPONSES TO PHQ QUESTIONS 1-9: 0
SUM OF ALL RESPONSES TO PHQ9 QUESTIONS 1 & 2: 0
SUM OF ALL RESPONSES TO PHQ QUESTIONS 1-9: 0
1. LITTLE INTEREST OR PLEASURE IN DOING THINGS: 0
2. FEELING DOWN, DEPRESSED OR HOPELESS: 0

## 2023-10-02 ASSESSMENT — ENCOUNTER SYMPTOMS
RESPIRATORY NEGATIVE: 1
GASTROINTESTINAL NEGATIVE: 1

## 2023-10-02 NOTE — PROGRESS NOTES
[facility]  [address] [facility], [address] [place] [address]  [phone]        Mr. Baudilio Saenz is a 80 y.o. male with a diagnosis of prostate cancer. He is s/p RALP with BPLND on 11/2/21, GS 3+4, pT2N0, 0/27 nodes, -SM. INTERVAL HISTORY: Patient was planning to move to the [facility] but his friend developed recurrent head neck cancer and is currently undergoing treatment. He may still move to the [facility] in the near future. He has a history of prostate cancer and is status post prostatectomy 11/2/2021. His PSAs have been less than 0.03. He has no complaints today. He is not leaking any urine. His main issue is nocturia x3-5 nightly. He has not had any gross hematuria or dysuria. He has never tried an anticholinergic. His ultrasensitive PSA is pending. From previous note:  Patient is here today for follow-up after his robotic prostatectomy on 11/2/2021. He has no complaints today. He does not have any stress urinary incontinence. He voids with a strong stream.  He denies any hematuria or dysuria. He tells me he is hoping to move to [facility] over the next several months. His last ultrasensitive PSA was 0.018. Previously it was 0.020. Past medical, family and social histories, as well as medications and allergies, were reviewed and updated in the medical record as appropriate.     PMH:     Past Medical History:   Diagnosis Date    Arthritis     OA- knees     Cancer ([address] )     prostate cancer    Chronic pain     back, hip and knee -  takes Tramadol    Emphysema lung ([address]) 2021    \"very mild per scan\" no inhalers    Former smoker, stopped smoking in distant past     Quit 2011    GERD (gastroesophageal reflux disease)     omeprazole & pepcid daily - well controlled    Hypertension     losartan BP controlled     Sleep disorder     ativan QHS       MEDs:

## 2023-10-03 LAB — PSA SERPL DL<=0.01 NG/ML-MCNC: 0.04 NG/ML (ref 0–4)

## 2023-10-11 ENCOUNTER — TELEPHONE (OUTPATIENT)
Dept: ONCOLOGY | Age: 68
End: 2023-10-11

## 2023-10-11 NOTE — TELEPHONE ENCOUNTER
Called and informed pt of uPSA test results per Dr. Serina Priest request. Pt moved from 6 month f/u to 3 month. I changed appt and confirmed w/ pt for 1/8. Pt expressed understanding.

## 2024-01-02 DIAGNOSIS — C61 PROSTATE CANCER (HCC): Primary | ICD-10-CM

## 2024-01-08 ENCOUNTER — OFFICE VISIT (OUTPATIENT)
Dept: ONCOLOGY | Age: 69
End: 2024-01-08
Payer: MEDICARE

## 2024-01-08 ENCOUNTER — HOSPITAL ENCOUNTER (OUTPATIENT)
Dept: LAB | Age: 69
Discharge: HOME | End: 2024-01-11
Payer: MEDICARE

## 2024-01-08 VITALS
TEMPERATURE: 97.6 F | HEART RATE: 90 BPM | RESPIRATION RATE: 18 BRPM | DIASTOLIC BLOOD PRESSURE: 94 MMHG | WEIGHT: 205.8 LBS | HEIGHT: 69 IN | OXYGEN SATURATION: 94 % | SYSTOLIC BLOOD PRESSURE: 125 MMHG | BODY MASS INDEX: 30.48 KG/M2

## 2024-01-08 DIAGNOSIS — C61 PROSTATE CANCER (HCC): ICD-10-CM

## 2024-01-08 DIAGNOSIS — C61 PROSTATE CANCER (HCC): Primary | ICD-10-CM

## 2024-01-08 PROCEDURE — G8484 FLU IMMUNIZE NO ADMIN: HCPCS | Performed by: UROLOGY

## 2024-01-08 PROCEDURE — 99213 OFFICE O/P EST LOW 20 MIN: CPT | Performed by: UROLOGY

## 2024-01-08 PROCEDURE — 1123F ACP DISCUSS/DSCN MKR DOCD: CPT | Performed by: UROLOGY

## 2024-01-08 PROCEDURE — G8427 DOCREV CUR MEDS BY ELIG CLIN: HCPCS | Performed by: UROLOGY

## 2024-01-08 PROCEDURE — 1036F TOBACCO NON-USER: CPT | Performed by: UROLOGY

## 2024-01-08 PROCEDURE — G8417 CALC BMI ABV UP PARAM F/U: HCPCS | Performed by: UROLOGY

## 2024-01-08 PROCEDURE — 3017F COLORECTAL CA SCREEN DOC REV: CPT | Performed by: UROLOGY

## 2024-01-08 PROCEDURE — 84153 ASSAY OF PSA TOTAL: CPT

## 2024-01-08 PROCEDURE — 36415 COLL VENOUS BLD VENIPUNCTURE: CPT

## 2024-01-08 RX ORDER — OXYBUTYNIN CHLORIDE 5 MG/1
5 TABLET ORAL
Qty: 90 TABLET | Refills: 3 | Status: SHIPPED | OUTPATIENT
Start: 2024-01-08

## 2024-01-08 ASSESSMENT — ENCOUNTER SYMPTOMS
RESPIRATORY NEGATIVE: 1
GASTROINTESTINAL NEGATIVE: 1

## 2024-01-08 NOTE — PATIENT INSTRUCTIONS
Patient Instructions from Today's Visit    Reason for Visit:  Follow up    Diagnosis Information:  https://www.cancer.net/about-us/asco-answers-patient-education-materials/dpmw-fjgyeug-qbbc-sheets    Plan:  -Try to take the medication prescribed around dinner time to see if that helps with your frequency.  -If your PSA is up again today, then you will need a PSMA PET scan and a referral to our radiation oncology team.  -We will send you a referral to a new urology oncologist in [place] for when you move.    Follow Up:  Scheduled following PSA results    Recent Lab Results:  N/A    Treatment Summary has been discussed and given to patient: N/A        -------------------------------------------------------------------------------------------------------------------  Please call our office at [phone] if you have any  of the following symptoms:   Fever of 100.5 or greater  Chills  Shortness of breath  Swelling or pain in one leg    After office hours an answering service is available and will contact a provider for emergencies or if you are experiencing any of the above symptoms.    Patient does express an interest in My Chart.  My Chart log in information explained on the after visit summary printout at the check-out desk.    Nicole Wu MA

## 2024-01-08 NOTE — PROGRESS NOTES
Urologic Oncology  [facility]  [address] [place] [address]  [phone]        Mr. Lucian Bal is a 68 y.o. male with a diagnosis of prostate cancer.     He is s/p RALP with BPLND on 11/2/21, GS 3+4, pT2N0, 0/27 nodes, -SM.    INTERVAL HISTORY: Patient is here today for follow-up.  He has been planning to move to [place] for the past year and is finally going to do that on February 1.  His PSA was rechecked on 10/2/2023.  It was up slightly to 0.036.  He has 1 previous that was 0.025.  Had a long discussion with him about that today.  If his ultrasensitive PSA is up today I have recommended a PSMA PET and radiation oncology referral.    He states that Ditropan helped some with his frequency but still has nocturia 3-4 times.  I have recommended trying to take 5 mg prior to bedtime to see if that helps.    He denies any hematuria or dysuria.  He voids with a strong stream.          From previous note:   Patient was planning to move to the [facility] but his friend developed recurrent head neck cancer and is currently undergoing treatment.  He may still move to the [place] area in the near future.     He has a history of prostate cancer and is status post prostatectomy 11/2/2021.  His PSAs have been less than 0.03.  He has no complaints today.  He is not leaking any urine.  His main issue is nocturia x3-5 nightly.  He has not had any gross hematuria or dysuria.  He has never tried an anticholinergic.     His ultrasensitive PSA is pending.    Past medical, family and social histories, as well as medications and allergies, were reviewed and updated in the medical record as appropriate.    PMH:     Past Medical History:   Diagnosis Date    Arthritis     OA- knees     Cancer (HCC)     prostate cancer    Chronic pain     back, hip and knee -  takes Tramadol    Emphysema lung (HCC) 2021    \"very mild per scan\" no inhalers    Former smoker, stopped smoking in

## 2024-01-09 ENCOUNTER — TELEPHONE (OUTPATIENT)
Dept: ONCOLOGY | Age: 69
End: 2024-01-09

## 2024-01-09 ENCOUNTER — TELEPHONE (OUTPATIENT)
Dept: RADIATION ONCOLOGY | Age: 69
End: 2024-01-09

## 2024-01-09 NOTE — TELEPHONE ENCOUNTER
Physician provider: Dr. Kaur  Reason for today's call: results  Last office visit:n/a    Patient notified that their information will be routed to the [facility] clinical triage team for review. Patient is advised that they will receive a phone call from the triage department. If symptoms worsen before receiving a call back, the patient has been advised to proceed to the nearest ED.    Pt request call back to discuss results from  PSA from 01/08/24. Pt stated he is very anxious & stressed due to he need to verify if he has Cancer or not.

## 2024-01-09 NOTE — TELEPHONE ENCOUNTER
Pt had PSA drawn yesterday & is waiting on results, he said its been 24hrs & the last time he had the results with in 5hrs. Pt is very anxious and needs a call back asap.

## 2024-01-10 ENCOUNTER — TELEPHONE (OUTPATIENT)
Dept: ONCOLOGY | Age: 69
End: 2024-01-10

## 2024-01-10 LAB — PSA SERPL DL<=0.01 NG/ML-MCNC: 0.03 NG/ML (ref 0–4)

## 2024-01-10 NOTE — TELEPHONE ENCOUNTER
After discussing with Nicole I called and informed Mr. Bal that the best way for ELLIOT request would likely be to come to the office to complete the required paperwork. I also offered our phone number for him to call and make a request to see if there is a way to complete this remotely. He stated that he would prefer to just come in and thanked me for the call.

## 2024-01-10 NOTE — TELEPHONE ENCOUNTER
I called and spoke with Mr. Bal about his uPSA results. His PSA is back down to 0.026 on 1/8. I recommend rechecking in 3-4 months. Patient states that he is moving to [place] and has already scheduled an appointment with a physician there to continue monitoring his PSA. He requests a call to discuss ELLIOT to this location and thanked me for my call.

## 2024-02-16 NOTE — PROGRESS NOTES
Admission Note:  Report received from Santos phillips RN-PACU    Pt A&Ox4 accompanied by friend at bedside. Up with assistance but has not ambulated with me this shift. Laparoscopic sites assessed and dressings are CDI x5. Pts bottom is also clean with no skin issues (RN check off with Aurora Alireza). Pt c/o x1, dilaudid was given. PIV x2 with D5 1/2NS infusing at 75. Clear Liquid diet.  Will continue to monitor and reassess SW Discharge Planning       SW called [facility] ( [phone])  and spoke with ,  Chary  , who reported that [facility] ( [phone])  will NOT be involved this time       PLAN    Baby CAN be discharged home when medically ready, children services will NOT be involved at this time.     Electronically signed by ANTONIA Nieto on 2/16/2024 at 10:49 AM

## 2024-03-12 ENCOUNTER — HOSPITAL ENCOUNTER (EMERGENCY)
Age: 69
Discharge: HOME | End: 2024-03-13
Payer: MEDICARE

## 2024-03-12 DIAGNOSIS — S99.912A INJURY OF LEFT ANKLE, INITIAL ENCOUNTER: Primary | ICD-10-CM
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
## 2024-03-12 PROCEDURE — 99283 EMERGENCY DEPT VISIT LOW MDM: CPT

## 2024-03-12 ASSESSMENT — PAIN - FUNCTIONAL ASSESSMENT: PAIN_FUNCTIONAL_ASSESSMENT: 0-10

## 2024-03-12 ASSESSMENT — LIFESTYLE VARIABLES
HOW MANY STANDARD DRINKS CONTAINING ALCOHOL DO YOU HAVE ON A TYPICAL DAY: 3 OR 4
HOW OFTEN DO YOU HAVE A DRINK CONTAINING ALCOHOL: 4 OR MORE TIMES A WEEK

## 2024-03-12 ASSESSMENT — PAIN SCALES - GENERAL: PAINLEVEL_OUTOF10: 7

## 2024-03-13 ENCOUNTER — APPOINTMENT (OUTPATIENT)
Dept: GENERAL RADIOLOGY | Age: 69
End: 2024-03-13
Payer: MEDICARE

## 2024-03-13 VITALS
DIASTOLIC BLOOD PRESSURE: 96 MMHG | BODY MASS INDEX: 30.48 KG/M2 | WEIGHT: 205.8 LBS | SYSTOLIC BLOOD PRESSURE: 148 MMHG | HEIGHT: 69 IN | OXYGEN SATURATION: 98 % | HEART RATE: 81 BPM | TEMPERATURE: 97.7 F | RESPIRATION RATE: 18 BRPM

## 2024-03-13 PROCEDURE — 73610 X-RAY EXAM OF ANKLE: CPT

## 2024-03-13 PROCEDURE — 73562 X-RAY EXAM OF KNEE 3: CPT

## 2024-03-13 PROCEDURE — 73630 X-RAY EXAM OF FOOT: CPT

## 2024-03-13 PROCEDURE — 6370000000 HC RX 637 (ALT 250 FOR IP)

## 2024-03-13 RX ORDER — HYDROCODONE BITARTRATE AND ACETAMINOPHEN 7.5; 325 MG/1; MG/1
1 TABLET ORAL
Status: COMPLETED | OUTPATIENT
Start: 2024-03-13 | End: 2024-03-13

## 2024-03-13 RX ADMIN — HYDROCODONE BITARTRATE AND ACETAMINOPHEN 1 TABLET: 7.5; 325 TABLET ORAL at 00:39

## 2024-03-13 NOTE — DISCHARGE INSTRUCTIONS
I suspect you may have an injury to you achilles tendon. Please call your orthopedic provider tomorrow for close follow up this week. Wear walking boot at all times. Take norco as needed for pain.

## 2024-03-13 NOTE — ED PROVIDER NOTES
Emergency Department Provider Note       PCP: Galindo Ocasio MD   Age: 69 y.o.   Sex: male     DISPOSITION Decision To Discharge 03/13/2024 01:46:21 AM       ICD-10-CM    1. Injury of left ankle, initial encounter  S99.912A       2. Fall, initial encounter  W19.XXXA           Medical Decision Making     69 year old male presents with posterior left ankle pain after fall. Palpable defect to achilles tendon. Plantar flexion intact, pain and limited dorsiflexion. XR negative. Patient is from [facility] and is established with orthopedic provider. Placed in boot and encouraged to follow up with ortho later this week.  Patient has home with [place].  He is agreeable to plan.    ED Course as of 03/13/24 0257   Wed Mar 13, 2024   0131 XR left ankle:  IMPRESSION:     1. No acute fracture or dislocation.   [CJ]   0132 XR right foot:  IMPRESSION:  No fracture or dislocation   [CJ]   0132 XR right knee:  IMPRESSION:  Degenerative changes with no evidence of acute fracture or  dislocation.   [CJ]      ED Course User Index  [CJ] Farzana Wu, APRN - CNP     1 or more acute illnesses that pose a threat to life or bodily function.   Over the counter drug management performed.  Prescription drug management performed.  Patient was discharged risks and benefits of hospitalization were considered.  Shared medical decision making was utilized in creating the patients health plan today.    I independently ordered and reviewed each unique test.  I reviewed external records: ED visit note from an outside group.  I reviewed external records: provider visit note from PCP.  I reviewed external records: provider visit note from outside specialist.     I interpreted the X-rays osteoarthritis, no acute fracture.              History     69-year-old male presents with posterior left ankle pain, right knee pain and right foot pain after fall while at a bar earlier this evening.  He reports that he tripped over a chair striking his

## 2024-03-13 NOTE — ED TRIAGE NOTES
Pt 70 y/o male arrives via [facility] ems from a [facility] with a complaint of a fall, pt states he tripped over a chair, pt denies hitting his head. Pt states he hit his knee and both of his elbows. Pt states the back of his left ankle has some swelling.

## 2024-03-13 NOTE — ED NOTES
I have reviewed discharge instructions with the patient.  The patient verbalized understanding.    Patient left ED via Discharge Method: ambulatory to hotel with uber.    Opportunity for questions and clarification provided.       Patient given 0 scripts.         To continue your aftercare when you leave the hospital, you may receive an automated call from our care team to check in on how you are doing.  This is a free service and part of our promise to provide the best care and service to meet your aftercare needs.” If you have questions, or wish to unsubscribe from this service please call [phone].  Thank you for Choosing our [facility] Emergency Department.       Nayana Manning, JEAN  03/13/24 6244

## 2024-03-13 NOTE — ED NOTES
[facility] [address] [place] [address] in [place].  AM.     Twila Montague  03/13/24 0203

## 2024-09-23 ENCOUNTER — APPOINTMENT (RX ONLY)
Dept: URBAN - METROPOLITAN AREA CLINIC 20 | Facility: CLINIC | Age: 69
Setting detail: DERMATOLOGY
End: 2024-09-23

## 2024-09-23 DIAGNOSIS — L57.8 OTHER SKIN CHANGES DUE TO CHRONIC EXPOSURE TO NONIONIZING RADIATION: ICD-10-CM

## 2024-09-23 DIAGNOSIS — L82.1 OTHER SEBORRHEIC KERATOSIS: ICD-10-CM

## 2024-09-23 DIAGNOSIS — L81.4 OTHER MELANIN HYPERPIGMENTATION: ICD-10-CM

## 2024-09-23 DIAGNOSIS — D18.0 HEMANGIOMA: ICD-10-CM

## 2024-09-23 DIAGNOSIS — L57.0 ACTINIC KERATOSIS: ICD-10-CM

## 2024-09-23 PROBLEM — D18.01 HEMANGIOMA OF SKIN AND SUBCUTANEOUS TISSUE: Status: ACTIVE | Noted: 2024-09-23

## 2024-09-23 PROCEDURE — ? PRESCRIPTION

## 2024-09-23 PROCEDURE — ? PRESCRIPTION MEDICATION MANAGEMENT

## 2024-09-23 PROCEDURE — 17000 DESTRUCT PREMALG LESION: CPT

## 2024-09-23 PROCEDURE — ? LIQUID NITROGEN

## 2024-09-23 PROCEDURE — ? COUNSELING

## 2024-09-23 PROCEDURE — 99214 OFFICE O/P EST MOD 30 MIN: CPT | Mod: 25

## 2024-09-23 RX ORDER — FLUOROURACIL 2 G/40G
CREAM TOPICAL BID
Qty: 40 | Refills: 0 | Status: ERX | COMMUNITY
Start: 2024-09-23

## 2024-09-23 RX ADMIN — FLUOROURACIL: 2 CREAM TOPICAL at 00:00

## 2024-09-23 ASSESSMENT — LOCATION DETAILED DESCRIPTION DERM
LOCATION DETAILED: RIGHT DISTAL CALF
LOCATION DETAILED: RIGHT SUPERIOR MEDIAL UPPER BACK
LOCATION DETAILED: RIGHT POSTERIOR SHOULDER
LOCATION DETAILED: LEFT LATERAL UPPER BACK
LOCATION DETAILED: LEFT SUPERIOR UPPER BACK
LOCATION DETAILED: SUPERIOR LUMBAR SPINE
LOCATION DETAILED: LEFT DISTAL DORSAL FOREARM
LOCATION DETAILED: RIGHT LATERAL UPPER BACK
LOCATION DETAILED: LEFT DISTAL PRETIBIAL REGION
LOCATION DETAILED: LEFT DISTAL CALF
LOCATION DETAILED: LEFT LATERAL ABDOMEN
LOCATION DETAILED: INFERIOR THORACIC SPINE
LOCATION DETAILED: RIGHT PROXIMAL PRETIBIAL REGION
LOCATION DETAILED: LEFT MEDIAL FOREHEAD
LOCATION DETAILED: RIGHT PROXIMAL DORSAL FOREARM
LOCATION DETAILED: EPIGASTRIC SKIN
LOCATION DETAILED: RIGHT LATERAL INFERIOR CHEST
LOCATION DETAILED: RIGHT LATERAL ABDOMEN
LOCATION DETAILED: LEFT INFERIOR UPPER BACK
LOCATION DETAILED: RIGHT ANTERIOR PROXIMAL THIGH
LOCATION DETAILED: LEFT ANTERIOR PROXIMAL THIGH
LOCATION DETAILED: STERNUM
LOCATION DETAILED: RIGHT PROXIMAL POSTERIOR UPPER ARM
LOCATION DETAILED: LEFT PROXIMAL POSTERIOR UPPER ARM
LOCATION DETAILED: LEFT POSTERIOR SHOULDER
LOCATION DETAILED: NASAL SUPRATIP

## 2024-09-23 ASSESSMENT — LOCATION SIMPLE DESCRIPTION DERM
LOCATION SIMPLE: CHEST
LOCATION SIMPLE: LEFT SHOULDER
LOCATION SIMPLE: RIGHT UPPER BACK
LOCATION SIMPLE: LEFT PRETIBIAL REGION
LOCATION SIMPLE: RIGHT SHOULDER
LOCATION SIMPLE: RIGHT UPPER ARM
LOCATION SIMPLE: ABDOMEN
LOCATION SIMPLE: LEFT CALF
LOCATION SIMPLE: RIGHT THIGH
LOCATION SIMPLE: NOSE
LOCATION SIMPLE: RIGHT PRETIBIAL REGION
LOCATION SIMPLE: LEFT FOREHEAD
LOCATION SIMPLE: RIGHT CALF
LOCATION SIMPLE: LOWER BACK
LOCATION SIMPLE: LEFT FOREARM
LOCATION SIMPLE: RIGHT FOREARM
LOCATION SIMPLE: LEFT UPPER BACK
LOCATION SIMPLE: UPPER BACK
LOCATION SIMPLE: LEFT UPPER ARM
LOCATION SIMPLE: LEFT THIGH

## 2024-09-23 ASSESSMENT — LOCATION ZONE DERM
LOCATION ZONE: FACE
LOCATION ZONE: LEG
LOCATION ZONE: TRUNK
LOCATION ZONE: NOSE
LOCATION ZONE: ARM

## 2024-09-23 NOTE — PROCEDURE: PRESCRIPTION MEDICATION MANAGEMENT
Render In Strict Bullet Format?: No
Continue Regimen: Currently using Effudex cream
Detail Level: Zone

## 2025-03-24 ENCOUNTER — APPOINTMENT (OUTPATIENT)
Dept: URBAN - METROPOLITAN AREA CLINIC 20 | Facility: CLINIC | Age: 70
Setting detail: DERMATOLOGY
End: 2025-03-24

## 2025-03-24 DIAGNOSIS — L57.8 OTHER SKIN CHANGES DUE TO CHRONIC EXPOSURE TO NONIONIZING RADIATION: ICD-10-CM

## 2025-03-24 DIAGNOSIS — Z71.89 OTHER SPECIFIED COUNSELING: ICD-10-CM

## 2025-03-24 DIAGNOSIS — L82.1 OTHER SEBORRHEIC KERATOSIS: ICD-10-CM

## 2025-03-24 DIAGNOSIS — D18.0 HEMANGIOMA: ICD-10-CM

## 2025-03-24 DIAGNOSIS — D485 NEOPLASM OF UNCERTAIN BEHAVIOR OF SKIN: ICD-10-CM | Status: INADEQUATELY CONTROLLED

## 2025-03-24 DIAGNOSIS — D22 MELANOCYTIC NEVI: ICD-10-CM

## 2025-03-24 DIAGNOSIS — L81.4 OTHER MELANIN HYPERPIGMENTATION: ICD-10-CM

## 2025-03-24 DIAGNOSIS — L57.0 ACTINIC KERATOSIS: ICD-10-CM

## 2025-03-24 PROBLEM — D22.71 MELANOCYTIC NEVI OF RIGHT LOWER LIMB, INCLUDING HIP: Status: ACTIVE | Noted: 2025-03-24

## 2025-03-24 PROBLEM — D22.9 MELANOCYTIC NEVI, UNSPECIFIED: Status: ACTIVE | Noted: 2025-03-24

## 2025-03-24 PROBLEM — D22.62 MELANOCYTIC NEVI OF LEFT UPPER LIMB, INCLUDING SHOULDER: Status: ACTIVE | Noted: 2025-03-24

## 2025-03-24 PROBLEM — D22.5 MELANOCYTIC NEVI OF TRUNK: Status: ACTIVE | Noted: 2025-03-24

## 2025-03-24 PROBLEM — D22.72 MELANOCYTIC NEVI OF LEFT LOWER LIMB, INCLUDING HIP: Status: ACTIVE | Noted: 2025-03-24

## 2025-03-24 PROBLEM — D18.01 HEMANGIOMA OF SKIN AND SUBCUTANEOUS TISSUE: Status: ACTIVE | Noted: 2025-03-24

## 2025-03-24 PROBLEM — D22.61 MELANOCYTIC NEVI OF RIGHT UPPER LIMB, INCLUDING SHOULDER: Status: ACTIVE | Noted: 2025-03-24

## 2025-03-24 PROBLEM — D48.5 NEOPLASM OF UNCERTAIN BEHAVIOR OF SKIN: Status: ACTIVE | Noted: 2025-03-24

## 2025-03-24 PROCEDURE — ? BIOPSY BY SHAVE METHOD

## 2025-03-24 PROCEDURE — 99213 OFFICE O/P EST LOW 20 MIN: CPT | Mod: 25

## 2025-03-24 PROCEDURE — 17000 DESTRUCT PREMALG LESION: CPT | Mod: 59

## 2025-03-24 PROCEDURE — ? COUNSELING

## 2025-03-24 PROCEDURE — 11102 TANGNTL BX SKIN SINGLE LES: CPT

## 2025-03-24 PROCEDURE — ? LIQUID NITROGEN

## 2025-03-24 ASSESSMENT — LOCATION SIMPLE DESCRIPTION DERM
LOCATION SIMPLE: NOSE
LOCATION SIMPLE: LEFT UPPER ARM
LOCATION SIMPLE: HAIR
LOCATION SIMPLE: RIGHT CHEEK
LOCATION SIMPLE: RIGHT UPPER ARM
LOCATION SIMPLE: CHEST
LOCATION SIMPLE: LEFT THIGH
LOCATION SIMPLE: RIGHT THIGH
LOCATION SIMPLE: LEFT CHEEK
LOCATION SIMPLE: LEFT FOREHEAD
LOCATION SIMPLE: RIGHT UPPER BACK

## 2025-03-24 ASSESSMENT — LOCATION DETAILED DESCRIPTION DERM
LOCATION DETAILED: RIGHT ANTERIOR PROXIMAL UPPER ARM
LOCATION DETAILED: RIGHT MEDIAL UPPER BACK
LOCATION DETAILED: RIGHT ANTERIOR DISTAL UPPER ARM
LOCATION DETAILED: LEFT INFERIOR MEDIAL FOREHEAD
LOCATION DETAILED: RIGHT ANTERIOR PROXIMAL THIGH
LOCATION DETAILED: HAIR
LOCATION DETAILED: LEFT INFERIOR CENTRAL MALAR CHEEK
LOCATION DETAILED: RIGHT MEDIAL SUPERIOR CHEST
LOCATION DETAILED: RIGHT ANTERIOR DISTAL THIGH
LOCATION DETAILED: NASAL DORSUM
LOCATION DETAILED: RIGHT SUPERIOR MEDIAL UPPER BACK
LOCATION DETAILED: RIGHT LATERAL MALAR CHEEK
LOCATION DETAILED: LEFT SUPERIOR MEDIAL BUCCAL CHEEK
LOCATION DETAILED: LEFT ANTERIOR PROXIMAL UPPER ARM
LOCATION DETAILED: UPPER STERNUM
LOCATION DETAILED: LEFT ANTERIOR PROXIMAL THIGH
LOCATION DETAILED: LEFT ANTERIOR DISTAL THIGH

## 2025-03-24 ASSESSMENT — LESION DIAMETER IN CM
PLEASE MEASURE THE GREATEST DIAMETER IN CM. IF MORE THAN ONE LESION, PLEASE SUM THE GREATEST DIAMETER OF ALL TREATED LESIONS.: 0.5

## 2025-03-24 ASSESSMENT — LOCATION ZONE DERM
LOCATION ZONE: LEG
LOCATION ZONE: SCALP
LOCATION ZONE: ARM
LOCATION ZONE: FACE
LOCATION ZONE: TRUNK
LOCATION ZONE: NOSE

## 2025-03-24 ASSESSMENT — TOTAL NUMBER OF LESIONS: # OF LESIONS?: 1

## 2025-03-24 ASSESSMENT — PAIN INTENSITY VAS: HOW INTENSE IS YOUR PAIN 0 BEING NO PAIN, 10 BEING THE MOST SEVERE PAIN POSSIBLE?: NO PAIN

## 2025-03-24 NOTE — PROCEDURE: BIOPSY BY SHAVE METHOD
Detail Level: Detailed
Depth Of Biopsy: dermis
Was A Bandage Applied: Yes
Size Of Lesion In Cm: 0
Biopsy Type: H and E
Biopsy Method: Personna blade
Anesthesia Type: 1% lidocaine with epinephrine
Anesthesia Volume In Cc: 0.5
Hemostasis: Aluminum Chloride
Wound Care: Petrolatum
Dressing: Band-Aid
Destruction After The Procedure: No
Type Of Destruction Used: Curettage
Curettage Text: The wound bed was treated with curettage after the biopsy was performed.
Cryotherapy Text: The wound bed was treated with cryotherapy after the biopsy was performed.
Electrodesiccation Text: The wound bed was treated with electrodesiccation after the biopsy was performed.
Electrodesiccation And Curettage Text: The wound bed was treated with electrodesiccation and curettage after the biopsy was performed.
Silver Nitrate Text: The wound bed was treated with silver nitrate after the biopsy was performed.
Lab: [id number]
Lab Facility: [id number]
Medical Necessity Information: It is in your best interest to select a reason for this procedure from the list below. All of these items fulfill various CMS LCD requirements except the new and changing color options.
Consent: Written consent was obtained and risks were reviewed including but not limited to scarring, infection, bleeding, scabbing, incomplete removal, nerve damage and allergy to anesthesia.
Post-Care Instructions: The patient was instructed on proper post care to the biopsy site. Leave the bandage in place with the site clean and dry for 24 hours. Then remove the bandage, clean with mild soap and water, dry the site , apply fresh vaseline/aquaphor and a new bandage. Continue daily until the site is fully healed.
Notification Instructions: Patient will be notified of biopsy results. However, patient instructed to call the office if not contacted within 2 weeks.
Billing Type: Third-Party Bill
Information: Selecting Yes will display possible errors in your note based on the variables you have selected. This validation is only offered as a suggestion for you. PLEASE NOTE THAT THE VALIDATION TEXT WILL BE REMOVED WHEN YOU FINALIZE YOUR NOTE. IF YOU WANT TO FAX A PRELIMINARY NOTE YOU WILL NEED TO TOGGLE THIS TO 'NO' IF YOU DO NOT WANT IT IN YOUR FAXED NOTE.

## 2025-03-24 NOTE — PROCEDURE: LIQUID NITROGEN
Post-Care Instructions: I reviewed with the patient in detail post-care instructions. Patient is to wear sunprotection, and avoid picking at any of the treated lesions. Pt may apply Vaseline to crusted or scabbing areas.
Render Post-Care Instructions In Note?: no
Consent: The patient's consent was obtained including but not limited to risks of crusting, scabbing, blistering, scarring, darker or lighter pigmentary change, recurrence, incomplete removal and infection.
Detail Level: Zone
Show Aperture Variable?: Yes
Duration Of Freeze Thaw-Cycle (Seconds): 3

## 2025-04-07 ENCOUNTER — APPOINTMENT (OUTPATIENT)
Dept: URBAN - METROPOLITAN AREA CLINIC 20 | Facility: CLINIC | Age: 70
Setting detail: DERMATOLOGY
End: 2025-04-07

## 2025-04-07 DIAGNOSIS — L57.0 ACTINIC KERATOSIS: ICD-10-CM

## 2025-04-07 PROCEDURE — ? LIQUID NITROGEN

## 2025-04-07 PROCEDURE — 17000 DESTRUCT PREMALG LESION: CPT

## 2025-04-07 ASSESSMENT — LOCATION ZONE DERM: LOCATION ZONE: FACE

## 2025-04-07 ASSESSMENT — LOCATION SIMPLE DESCRIPTION DERM: LOCATION SIMPLE: RIGHT CHEEK

## 2025-04-07 ASSESSMENT — LOCATION DETAILED DESCRIPTION DERM: LOCATION DETAILED: RIGHT LATERAL MALAR CHEEK

## 2025-04-07 NOTE — PROCEDURE: LIQUID NITROGEN
Render Post-Care Instructions In Note?: no
Duration Of Freeze Thaw-Cycle (Seconds): 3
Consent: The patient's consent was obtained including but not limited to risks of crusting, scabbing, blistering, scarring, darker or lighter pigmentary change, recurrence, incomplete removal and infection.
Show Aperture Variable?: Yes
Post-Care Instructions: I reviewed with the patient in detail post-care instructions. Patient is to wear sunprotection, and avoid picking at any of the treated lesions. Pt may apply Vaseline to crusted or scabbing areas.
Detail Level: Zone

## (undated) DEVICE — SUTURE PDS II SZ 1 L27IN ABSRB VLT CT-1 L36MM 1/2 CIR Z341H

## (undated) DEVICE — NEEDLE INSUF L120MM ULT VERES ENDOPATH

## (undated) DEVICE — COVER PRB W1XL11.8IN ENDOCAVITY CLR E BND NEOGUARD

## (undated) DEVICE — CLIP SUT ENDOSCP F/2-0/3-0/4-0 -- LAPRA-TY

## (undated) DEVICE — 2000CC GUARDIAN II: Brand: GUARDIAN

## (undated) DEVICE — BASIC SINGLE BASIN-LF: Brand: MEDLINE INDUSTRIES, INC.

## (undated) DEVICE — TROCAR: Brand: KII FIOS FIRST ENTRY

## (undated) DEVICE — SUTURE V-LOC 90 3-0 L6IN ABSRB UD CV-23 L17MM 1/2 CIR VLOCM1904

## (undated) DEVICE — BIPOLAR CAUTERY CORD

## (undated) DEVICE — TROCAR LAP L100MM DIA5MM BLDELSS W/ STBL SL ENDOPATH XCEL

## (undated) DEVICE — VISUALIZATION SYSTEM: Brand: CLEARIFY

## (undated) DEVICE — ELECTRO LUBE IS A SINGLE PATIENT USE DEVICE THAT IS INTENDED TO BE USED ON ELECTROSURGICAL ELECTRODES TO REDUCE STICKING.: Brand: KEY SURGICAL ELECTRO LUBE

## (undated) DEVICE — DRAPE TWL SURG 16X26IN BLU ORB04] ALLCARE INC]

## (undated) DEVICE — SUTURE V-LOC 180 SZ 3-0 L6IN ABSRB VLT CV-23 L17MM 1/2 CIR VLOCM0804

## (undated) DEVICE — DRAIN SURG 19FR 100% SIL RADPQ RND CHN FULL FLUT

## (undated) DEVICE — DRAPE,TOP,102X53,STERILE: Brand: MEDLINE

## (undated) DEVICE — ROBOTIC PROSTATE: Brand: MEDLINE INDUSTRIES, INC.

## (undated) DEVICE — ANCHOR TISSUE RETRIEVAL SYSTEM, BAG SIZE 250 ML, PORT SIZE 12 MM: Brand: ANCHOR TISSUE RETRIEVAL SYSTEM

## (undated) DEVICE — BLADELESS OBTURATOR: Brand: WECK VISTA

## (undated) DEVICE — AMD ANTIMICROBIAL NON-ADHERENT ISLAND DRESSING,0.2% POLYHEXAMETHYLENE BIGUANIDE HCI (PHMB): Brand: TELFA

## (undated) DEVICE — SEALER LAP L37CM SHFT DIA10MM TISS FUS HAND/FOOT SWCH BLNT

## (undated) DEVICE — STERILE PACKED. BORE DIAMETER 1.6 MM; ANGLE OF INSERTION 19° TO THE LONG AXIS OF THE TRANSDUCER: Brand: SINGLE-USE BIPLANE BIOPSY GUIDE

## (undated) DEVICE — REM POLYHESIVE ADULT PATIENT RETURN ELECTRODE: Brand: VALLEYLAB

## (undated) DEVICE — MAX-CORE® DISPOSABLE CORE BIOPSY INSTRUMENT, 18G X 25CM: Brand: MAX-CORE

## (undated) DEVICE — SOLUTION IRRIG 1000ML H2O STRL BLT

## (undated) DEVICE — BAG SPEC REM 224ML W4XL6IN DIA10MM 1 HND GYN DISP ENDOPCH

## (undated) DEVICE — SPONGE LAP 18X18IN STRL -- 5/PK

## (undated) DEVICE — COLUMN DRAPE

## (undated) DEVICE — DEVICE SECUREMENT 1/32IN POLYETH FOAM F ANCHR URIN CATH

## (undated) DEVICE — COVER,TABLE,44X76,STERILE: Brand: MEDLINE

## (undated) DEVICE — 40585 XL ADVANCED TRENDELENBURG POSITIONING KIT: Brand: 40585 XL ADVANCED TRENDELENBURG POSITIONING KIT

## (undated) DEVICE — KIT BX PROST 20ML VI PREFIL W 10ML 10% NEUT BUFF FRMLN LEAK

## (undated) DEVICE — DRAPE,UNDERBUTTOCKS,PCH,STERILE: Brand: MEDLINE

## (undated) DEVICE — SUTURE MCRYL SZ 4-0 L27IN ABSRB UD L19MM PS-2 1/2 CIR PRIM Y426H

## (undated) DEVICE — PAD,NON-ADHERENT,3X8,STERILE,LF,1/PK: Brand: MEDLINE

## (undated) DEVICE — [HIGH FLOW INSUFFLATOR,  DO NOT USE IF PACKAGE IS DAMAGED,  KEEP DRY,  KEEP AWAY FROM SUNLIGHT,  PROTECT FROM HEAT AND RADIOACTIVE SOURCES.]: Brand: PNEUMOSURE

## (undated) DEVICE — CONTAINER SPEC FRMLN 120ML --

## (undated) DEVICE — CATHETER,FOLEY,SILI-ELAST,LTX,18FR,10ML: Brand: MEDLINE

## (undated) DEVICE — Device

## (undated) DEVICE — SYRINGE CATH TIP 50ML

## (undated) DEVICE — SUTURE ABSORBABLE MONOFILAMENT 2-0 WND CLOSURE GRN V-LOC 180 VLOCL0315

## (undated) DEVICE — LEGGINGS, PAIR, 31X48, STERILE: Brand: MEDLINE

## (undated) DEVICE — CARDINAL HEALTH FLEXIBLE LIGHT HANDLE COVER: Brand: CARDINAL HEALTH

## (undated) DEVICE — JELLY LUBRICATING 10GM PREFIL SYR LUBE

## (undated) DEVICE — Z CONVERTED USE 2421973 CONTAINER SPEC 60/30ML 10% FRMLN POLYPR PREFIL

## (undated) DEVICE — TRAY PREP DRY W/ PREM GLV 2 APPL 6 SPNG 2 UNDPD 1 OVERWRAP

## (undated) DEVICE — GOWN,REINF,POLY,ECL,PP SLV,XL: Brand: MEDLINE

## (undated) DEVICE — POWDER HEMOSTAT GEL 3.0GR -- SURGICEL

## (undated) DEVICE — COVER MPLR TIP CRV SCIS ACC DA VINCI

## (undated) DEVICE — NEEDLE HYPO 21GA L1.5IN INTRAMUSCULAR S STL LATCH BVL UP

## (undated) DEVICE — BAG,DRAINAGE,4L,A/R TOWER,LL,SLIDE TAP: Brand: MEDLINE

## (undated) DEVICE — MONOPOLAR CAUTERY CORD

## (undated) DEVICE — SEAL UNIV 5-8MM DISP BX/10 -- DA VINCI XI - SNGL USE

## (undated) DEVICE — PREP SKN CHLRAPRP APL 26ML STR --

## (undated) DEVICE — ABSORBENT, WATERPROOF, BACTERIA PROOF FILM DRESSING: Brand: OPSITE POST OP 9.5X8.5CM CTN 20

## (undated) DEVICE — SYR 10ML LUER LOK 1/5ML GRAD --

## (undated) DEVICE — CLIP INT L POLYMER LOK LIG HEM O LOK

## (undated) DEVICE — ARM DRAPE